# Patient Record
Sex: FEMALE | Race: WHITE | NOT HISPANIC OR LATINO | Employment: FULL TIME | URBAN - NONMETROPOLITAN AREA
[De-identification: names, ages, dates, MRNs, and addresses within clinical notes are randomized per-mention and may not be internally consistent; named-entity substitution may affect disease eponyms.]

---

## 2017-01-16 ENCOUNTER — OFFICE VISIT (OUTPATIENT)
Dept: OBSTETRICS AND GYNECOLOGY | Facility: CLINIC | Age: 22
End: 2017-01-16

## 2017-01-16 VITALS
WEIGHT: 140 LBS | SYSTOLIC BLOOD PRESSURE: 90 MMHG | BODY MASS INDEX: 21.97 KG/M2 | DIASTOLIC BLOOD PRESSURE: 64 MMHG | HEIGHT: 67 IN

## 2017-01-16 DIAGNOSIS — N83.209 HEMORRHAGIC CYST OF OVARY: Primary | ICD-10-CM

## 2017-01-16 DIAGNOSIS — N92.1 MENORRHAGIA WITH IRREGULAR CYCLE: ICD-10-CM

## 2017-01-16 PROCEDURE — 99213 OFFICE O/P EST LOW 20 MIN: CPT | Performed by: OBSTETRICS & GYNECOLOGY

## 2017-01-16 NOTE — PROGRESS NOTES
"Subjective   Chief Complaint   Patient presents with   • Pelvic Pain     Kristal Moreno is a 21 y.o. year old  who comes to review her recent testing and discuss next steps.  The patient was originally seen on 16 with a chief complaint of having pelvic pain.  She had a known hemorrhagic cyst of the ovary.  Since then her most recent menses started to soon.  It was fairly heavy.  And that lasted longer than normal.  So she went to the emergency room, and underwent a pelvic ultrasound.  That ultrasound demonstrates that the hemorrhagic cyst is gone, the ultrasound was normal.  She's feeling better now.  Her bleeding has stopped.    Review of Systems   Constitutional: Negative for activity change, appetite change, chills and fever.   Respiratory: Negative for apnea, chest tightness and shortness of breath.    Cardiovascular: Negative for chest pain.   Gastrointestinal: Negative for abdominal distention and abdominal pain.   Genitourinary: Positive for menstrual problem. Negative for difficulty urinating, dysuria, flank pain, genital sores, pelvic pain, vaginal bleeding, vaginal discharge and vaginal pain.            Objective   Visit Vitals   • BP 90/64   • Ht 67\" (170.2 cm)   • Wt 140 lb (63.5 kg)   • LMP 2017 (Exact Date)   • Breastfeeding No   • BMI 21.93 kg/m2       Physical Exam    General Appearance:  well developed, well nourished and in no acute distress    Lab Review   No data reviewed      Imaging   Pelvic ultrasound report           Assessment    Diagnosis Plan   1. Hemorrhagic cyst of ovary     2. Menorrhagia with irregular cycle            Plan   1. The hemorrhagic cyst has resolved.  2. As this irregular bleeding has only occurred once, and the patient desires pregnancy and does not wish to be on any method of birth control, we will just observe for now.           This note was electronically signed.    Jerardo Carrasco M.D.  2017    "

## 2017-02-09 ENCOUNTER — TELEPHONE (OUTPATIENT)
Dept: OBSTETRICS AND GYNECOLOGY | Facility: CLINIC | Age: 22
End: 2017-02-09

## 2017-02-09 NOTE — TELEPHONE ENCOUNTER
----- Message from Jacki Pozo sent at 2/6/2017  8:37 AM CST -----  Contact: [phone]  Patient is needing a callback. States she is in pain from her cyst. She has been to the ER several times and they telling her she needs to see her

## 2017-04-10 ENCOUNTER — HOSPITAL ENCOUNTER (EMERGENCY)
Facility: HOSPITAL | Age: 22
Discharge: HOME | End: 2017-04-10
Attending: FAMILY MEDICINE | Admitting: NURSE PRACTITIONER

## 2017-04-10 ENCOUNTER — APPOINTMENT (OUTPATIENT)
Dept: ULTRASOUND IMAGING | Facility: HOSPITAL | Age: 22
End: 2017-04-10

## 2017-04-10 VITALS
WEIGHT: 140 LBS | OXYGEN SATURATION: 99 % | RESPIRATION RATE: 18 BRPM | HEART RATE: 71 BPM | SYSTOLIC BLOOD PRESSURE: 115 MMHG | HEIGHT: 66 IN | DIASTOLIC BLOOD PRESSURE: 82 MMHG | TEMPERATURE: 98 F | BODY MASS INDEX: 22.5 KG/M2

## 2017-04-10 DIAGNOSIS — N92.1 PROLONGED MENSTRUATION: Primary | ICD-10-CM

## 2017-04-10 DIAGNOSIS — N93.9 VAGINAL BLEEDING: ICD-10-CM

## 2017-04-10 LAB
ALBUMIN SERPL-MCNC: 4.2 G/DL (ref 3.4–4.8)
ALBUMIN/GLOB SERPL: 1.4 G/DL (ref 1.1–1.8)
ALP SERPL-CCNC: 76 U/L (ref 38–126)
ALT SERPL W P-5'-P-CCNC: 20 U/L (ref 9–52)
AMPHET+METHAMPHET UR QL: NEGATIVE
ANION GAP SERPL CALCULATED.3IONS-SCNC: 11 MMOL/L (ref 5–15)
AST SERPL-CCNC: 19 U/L (ref 14–36)
BACTERIA UR QL AUTO: ABNORMAL /HPF
BARBITURATES UR QL SCN: NEGATIVE
BASOPHILS # BLD AUTO: 0.02 10*3/MM3 (ref 0–0.2)
BASOPHILS NFR BLD AUTO: 0.2 % (ref 0–2)
BENZODIAZ UR QL SCN: NEGATIVE
BILIRUB SERPL-MCNC: 0.6 MG/DL (ref 0.2–1.3)
BILIRUB UR QL STRIP: ABNORMAL
BUN BLD-MCNC: 16 MG/DL (ref 7–21)
BUN/CREAT SERPL: 22.5 (ref 7–25)
CALCIUM SPEC-SCNC: 9.1 MG/DL (ref 8.4–10.2)
CANDIDA ALBICANS: NEGATIVE
CANNABINOIDS SERPL QL: NEGATIVE
CHLORIDE SERPL-SCNC: 102 MMOL/L (ref 95–110)
CLARITY UR: CLEAR
CO2 SERPL-SCNC: 24 MMOL/L (ref 22–31)
COCAINE UR QL: NEGATIVE
COLOR UR: ABNORMAL
CREAT BLD-MCNC: 0.71 MG/DL (ref 0.5–1)
DEPRECATED RDW RBC AUTO: 41.1 FL (ref 36.4–46.3)
EOSINOPHIL # BLD AUTO: 0.28 10*3/MM3 (ref 0–0.7)
EOSINOPHIL NFR BLD AUTO: 3.1 % (ref 0–7)
ERYTHROCYTE [DISTWIDTH] IN BLOOD BY AUTOMATED COUNT: 13.6 % (ref 11.5–14.5)
GARDNERELLA VAGINALIS: NEGATIVE
GFR SERPL CREATININE-BSD FRML MDRD: 105 ML/MIN/1.73 (ref 71–165)
GLOBULIN UR ELPH-MCNC: 2.9 GM/DL (ref 2.3–3.5)
GLUCOSE BLD-MCNC: 92 MG/DL (ref 60–100)
GLUCOSE UR STRIP-MCNC: NEGATIVE MG/DL
HCG INTACT+B SERPL-ACNC: <2.4 MIU/ML
HCG SERPL QL: NEGATIVE
HCT VFR BLD AUTO: 38.4 % (ref 35–45)
HGB BLD-MCNC: 13.2 G/DL (ref 12–15.5)
HGB UR QL STRIP.AUTO: ABNORMAL
HOLD SPECIMEN: NORMAL
HOLD SPECIMEN: NORMAL
HYALINE CASTS UR QL AUTO: ABNORMAL /LPF
IMM GRANULOCYTES # BLD: 0.01 10*3/MM3 (ref 0–0.02)
IMM GRANULOCYTES NFR BLD: 0.1 % (ref 0–0.5)
INR PPP: 1.06 (ref 0.8–1.2)
KETONES UR QL STRIP: ABNORMAL
LEUKOCYTE ESTERASE UR QL STRIP.AUTO: NEGATIVE
LIPASE SERPL-CCNC: 44 U/L (ref 23–300)
LYMPHOCYTES # BLD AUTO: 3.65 10*3/MM3 (ref 0.6–4.2)
LYMPHOCYTES NFR BLD AUTO: 40.4 % (ref 10–50)
MCH RBC QN AUTO: 28.3 PG (ref 26.5–34)
MCHC RBC AUTO-ENTMCNC: 34.4 G/DL (ref 31.4–36)
MCV RBC AUTO: 82.4 FL (ref 80–98)
METHADONE UR QL SCN: NEGATIVE
MONOCYTES # BLD AUTO: 0.5 10*3/MM3 (ref 0–0.9)
MONOCYTES NFR BLD AUTO: 5.5 % (ref 0–12)
NEUTROPHILS # BLD AUTO: 4.58 10*3/MM3 (ref 2–8.6)
NEUTROPHILS NFR BLD AUTO: 50.7 % (ref 37–80)
NITRITE UR QL STRIP: NEGATIVE
OPIATES UR QL: NEGATIVE
OXYCODONE UR QL SCN: NEGATIVE
PH UR STRIP.AUTO: 5.5 [PH] (ref 5–9)
PLATELET # BLD AUTO: 214 10*3/MM3 (ref 150–450)
PMV BLD AUTO: 10.9 FL (ref 8–12)
POTASSIUM BLD-SCNC: 4 MMOL/L (ref 3.5–5.1)
PROT SERPL-MCNC: 7.1 G/DL (ref 6.3–8.6)
PROT UR QL STRIP: ABNORMAL
PROTHROMBIN TIME: 13.7 SECONDS (ref 11.1–15.3)
RBC # BLD AUTO: 4.66 10*6/MM3 (ref 3.77–5.16)
RBC # UR: ABNORMAL /HPF
REF LAB TEST METHOD: ABNORMAL
SODIUM BLD-SCNC: 137 MMOL/L (ref 137–145)
SP GR UR STRIP: 1.04 (ref 1–1.03)
SQUAMOUS #/AREA URNS HPF: ABNORMAL /HPF
TRICHOMONAS VAGINALIS PCR: NEGATIVE
UROBILINOGEN UR QL STRIP: ABNORMAL
WBC NRBC COR # BLD: 9.04 10*3/MM3 (ref 3.2–9.8)
WBC UR QL AUTO: ABNORMAL /HPF
WHOLE BLOOD HOLD SPECIMEN: NORMAL
WHOLE BLOOD HOLD SPECIMEN: NORMAL

## 2017-04-10 PROCEDURE — 87660 TRICHOMONAS VAGIN DIR PROBE: CPT | Performed by: NURSE PRACTITIONER

## 2017-04-10 PROCEDURE — 80307 DRUG TEST PRSMV CHEM ANLYZR: CPT | Performed by: NURSE PRACTITIONER

## 2017-04-10 PROCEDURE — 99284 EMERGENCY DEPT VISIT MOD MDM: CPT

## 2017-04-10 PROCEDURE — 25010000002 KETOROLAC TROMETHAMINE PER 15 MG: Performed by: NURSE PRACTITIONER

## 2017-04-10 PROCEDURE — 25010000002 ONDANSETRON PER 1 MG: Performed by: NURSE PRACTITIONER

## 2017-04-10 PROCEDURE — 25010000002 MORPHINE SULFATE (PF) 2 MG/ML SOLUTION: Performed by: NURSE PRACTITIONER

## 2017-04-10 PROCEDURE — 96361 HYDRATE IV INFUSION ADD-ON: CPT

## 2017-04-10 PROCEDURE — 96375 TX/PRO/DX INJ NEW DRUG ADDON: CPT

## 2017-04-10 PROCEDURE — 83690 ASSAY OF LIPASE: CPT | Performed by: NURSE PRACTITIONER

## 2017-04-10 PROCEDURE — 76830 TRANSVAGINAL US NON-OB: CPT

## 2017-04-10 PROCEDURE — 84703 CHORIONIC GONADOTROPIN ASSAY: CPT | Performed by: NURSE PRACTITIONER

## 2017-04-10 PROCEDURE — 81001 URINALYSIS AUTO W/SCOPE: CPT | Performed by: NURSE PRACTITIONER

## 2017-04-10 PROCEDURE — 80053 COMPREHEN METABOLIC PANEL: CPT | Performed by: NURSE PRACTITIONER

## 2017-04-10 PROCEDURE — 84702 CHORIONIC GONADOTROPIN TEST: CPT | Performed by: NURSE PRACTITIONER

## 2017-04-10 PROCEDURE — 87591 N.GONORRHOEAE DNA AMP PROB: CPT | Performed by: FAMILY MEDICINE

## 2017-04-10 PROCEDURE — 87480 CANDIDA DNA DIR PROBE: CPT | Performed by: NURSE PRACTITIONER

## 2017-04-10 PROCEDURE — 85025 COMPLETE CBC W/AUTO DIFF WBC: CPT | Performed by: NURSE PRACTITIONER

## 2017-04-10 PROCEDURE — 87510 GARDNER VAG DNA DIR PROBE: CPT | Performed by: NURSE PRACTITIONER

## 2017-04-10 PROCEDURE — 87491 CHLMYD TRACH DNA AMP PROBE: CPT | Performed by: FAMILY MEDICINE

## 2017-04-10 PROCEDURE — 87086 URINE CULTURE/COLONY COUNT: CPT | Performed by: NURSE PRACTITIONER

## 2017-04-10 PROCEDURE — 96374 THER/PROPH/DIAG INJ IV PUSH: CPT

## 2017-04-10 PROCEDURE — 85610 PROTHROMBIN TIME: CPT | Performed by: NURSE PRACTITIONER

## 2017-04-10 RX ORDER — KETOROLAC TROMETHAMINE 30 MG/ML
30 INJECTION, SOLUTION INTRAMUSCULAR; INTRAVENOUS ONCE
Status: COMPLETED | OUTPATIENT
Start: 2017-04-10 | End: 2017-04-10

## 2017-04-10 RX ORDER — MORPHINE SULFATE 2 MG/ML
2 INJECTION, SOLUTION INTRAMUSCULAR; INTRAVENOUS ONCE
Status: COMPLETED | OUTPATIENT
Start: 2017-04-10 | End: 2017-04-10

## 2017-04-10 RX ORDER — ONDANSETRON 4 MG/1
4 TABLET, ORALLY DISINTEGRATING ORAL EVERY 6 HOURS PRN
Qty: 30 TABLET | Refills: 0 | Status: SHIPPED | OUTPATIENT
Start: 2017-04-10 | End: 2018-09-06

## 2017-04-10 RX ORDER — IBUPROFEN 600 MG/1
600 TABLET ORAL EVERY 6 HOURS PRN
Qty: 30 TABLET | Refills: 0 | Status: SHIPPED | OUTPATIENT
Start: 2017-04-10 | End: 2018-09-06

## 2017-04-10 RX ORDER — HYDROCODONE BITARTRATE AND ACETAMINOPHEN 7.5; 325 MG/1; MG/1
1 TABLET ORAL EVERY 6 HOURS PRN
Qty: 12 TABLET | Refills: 0 | Status: SHIPPED | OUTPATIENT
Start: 2017-04-10 | End: 2017-05-06

## 2017-04-10 RX ORDER — ONDANSETRON 2 MG/ML
4 INJECTION INTRAMUSCULAR; INTRAVENOUS ONCE
Status: COMPLETED | OUTPATIENT
Start: 2017-04-10 | End: 2017-04-10

## 2017-04-10 RX ADMIN — MORPHINE SULFATE 2 MG: 2 INJECTION, SOLUTION INTRAMUSCULAR; INTRAVENOUS at 19:12

## 2017-04-10 RX ADMIN — SODIUM CHLORIDE 1000 ML: 900 INJECTION, SOLUTION INTRAVENOUS at 18:44

## 2017-04-10 RX ADMIN — KETOROLAC TROMETHAMINE 30 MG: 30 INJECTION, SOLUTION INTRAMUSCULAR; INTRAVENOUS at 19:30

## 2017-04-10 RX ADMIN — ONDANSETRON 4 MG: 2 INJECTION INTRAMUSCULAR; INTRAVENOUS at 19:13

## 2017-04-10 NOTE — ED PROVIDER NOTES
Subjective   HPI Comments: Pt had the same symptoms in Jan,2017 and saw Dr. Carrasco on 01/16 and observation. Pt stated she started  Her menstruation on March 28 and has not stopped yet, associated suprapubic pain, which radiated to her low back a week ago. She also noticed breast pain and pink mucus vaginal discharge since last week. Pt tried to take lortab (leftover from Jan) and had no improvement. The bleeding from brown, pink to bright red.  Reported shaky and sweaty yesterday.    Patient is a 22 y.o. female presenting with vaginal bleeding.   History provided by:  Patient and parent (Mom)  Vaginal Bleeding   Quality: longer than her normal menstrual cycle, varying amount and color   Severity:  Moderate  Onset quality:  Gradual  Duration: since March 28,2017.  Timing:  Intermittent  Progression:  Waxing and waning  Chronicity:  Recurrent  Menstrual history:  Regular (usually lasted 5 days)  Number of pads used:  Today 3-4 pads  Relieved by:  Nothing  Worsened by:  Nothing  Ineffective treatments:  None tried  Associated symptoms: abdominal pain, back pain, dizziness, nausea and vaginal discharge    Associated symptoms: no dyspareunia, no dysuria, no fatigue and no fever    Abdominal pain:     Location:  Suprapubic    Quality: cramping      Severity:  Severe    Onset quality:  Gradual    Duration:  2 weeks    Timing:  Intermittent    Progression:  Waxing and waning    Chronicity:  Recurrent  Dizziness:     Severity:  Mild    Duration:  1 day    Timing:  Intermittent    Progression:  Unchanged  Nausea:     Severity:  Moderate    Onset quality:  Gradual    Duration:  2 days    Timing:  Intermittent    Progression:  Waxing and waning  Vaginal discharge:     Quality:  Bloody    Severity:  Mild    Onset quality:  Gradual    Duration: since March 28,2017.    Timing:  Intermittent    Progression:  Waxing and waning    Chronicity:  Recurrent      Review of Systems   Constitutional: Negative for activity change,  appetite change, chills, diaphoresis, fatigue and fever.   Respiratory: Negative.  Negative for cough and shortness of breath.    Gastrointestinal: Positive for abdominal pain and nausea.   Endocrine: Negative.    Genitourinary: Positive for hematuria, menstrual problem, vaginal bleeding and vaginal discharge. Negative for dyspareunia, dysuria, flank pain, frequency and urgency.   Musculoskeletal: Positive for back pain.   Skin: Negative.    Neurological: Positive for dizziness.   Psychiatric/Behavioral: Negative.    All other systems reviewed and are negative.      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      • Upper respiratory infection    • Urinary tract infectious disease    • Wheezing        No Known Allergies    Past Surgical History:   Procedure Laterality Date   • INJECTION OF MEDICATION  12/19/2012    Depo 150 mg (4)      • INJECTION OF MEDICATION  03/22/2013    Depo Provera (medroxyprogesterone acetate) (1)      • INJECTION OF MEDICATION  12/19/2011    Phenergan (1)      • INJECTION OF MEDICATION  12/19/2011    Toradol (2)      • TONSILLECTOMY         Family History   Problem Relation Age of Onset   • Breast cancer Other    • Lung cancer Other    • Stroke Other        Social History     Social History   • Marital status: Single     Spouse name: N/A   • Number of children: N/A   • Years of education: N/A     Social History Main Topics   • Smoking status: Current Every Day Smoker     Types: Cigarettes   • Smokeless tobacco: Never Used   • Alcohol use No   • Drug use: No   • Sexual activity: Yes     Partners: Male     Birth control/  protection: None     Other Topics Concern   • None     Social History Narrative           Objective   Physical Exam   Constitutional: She is oriented to person, place, and time. No distress.   Cardiovascular: Normal rate, regular rhythm, normal heart sounds and intact distal pulses.    Pulmonary/Chest: Effort normal and breath sounds normal.   Abdominal: Soft. Bowel sounds are normal. There is tenderness in the suprapubic area.   Genitourinary: Pelvic exam was performed with patient supine. Cervix exhibits motion tenderness. Right adnexum displays tenderness. Left adnexum displays tenderness. Vaginal discharge found.   Neurological: She is alert and oriented to person, place, and time.   Skin: Skin is warm and dry.   Psychiatric: She has a normal mood and affect. Her behavior is normal. Judgment and thought content normal.   Nursing note and vitals reviewed.      Procedures         ED Course  ED Course   Comment By Time   Updated pt and her Mom regarding the test results. Pt is still in severe pain. Will call Dr. Zapata for advice. BRYANNA Graham 04/10 1917   D/w Dr. Zapata regarding pt's condition and test results,she recommended to treat with ibuprofen and f/u with women [facility] this week. BRYANNA Graham 04/10 1922   Both pt and her mom wondered if pt already had miscarriage and already passed. Will add HCG quantity test, instructed them to f/u with OBGYN for the results, they understood it. BRYANNA Graham 04/10 1936   [facility] #[id number] BRYANNA Graham 04/10 1940      Labs Reviewed   URINALYSIS W/ CULTURE IF INDICATED - Abnormal; Notable for the following:        Result Value    Specific Gravity, UA 1.036 (*)     Ketones, UA Trace (*)     Bilirubin, UA Small (1+) (*)     Blood, UA Large (3+) (*)     Protein,  mg/dL (2+) (*)     All other components within normal limits   URINALYSIS, MICROSCOPIC ONLY - Abnormal; Notable for the following:     RBC, UA Too Numerous to Count (*)     Bacteria, UA 1+  (*)     Squamous Epithelial Cells, UA 6-12 (*)     All other components within normal limits   PROTIME-INR - Normal    Narrative:     Therapeutic range for most indications is 2.0-3.0 INR,  or 2.5-3.5 for mechanical heart valves.   HCG, SERUM, QUALITATIVE - Normal   URINE DRUG SCREEN - Normal    Narrative:     Negative Thresholds For Drugs Screened in Urine:     Amphetamines          500 ng/ml  Barbiturates          200 ng/ml  Benzodiazepines       200 ng/ml  Cocaine               150 ng/ml  Methadone             300 ng/mL  Opiates               300 ng/mL  Oxycodone             100 ng/mL  THC                   20 ng/mL    The normal value for all drugs tested is negative. This report includes final unconfirmed screening results.  A positive result by this assay can be, at your request, sent to the Reference Lab for confirmation by gas chromatography. Unconfirmed results must not be used for non-medical purposes, such as employment or legal testing. Clinical consideration should be applied to any drug of abuse test result, particularly when unconfirmed results are used.   COMPREHENSIVE METABOLIC PANEL - Normal   LIPASE - Normal   CBC WITH AUTO DIFFERENTIAL - Normal   LISA ALBICANS, GARDNERELLA VAGINALIS, TRICHOMONAS VAGINALIS, PCR   URINE CULTURE   CHLAMYDIA TRACHOMATIS, NEISSERIA GONORRHOEAE, PCR   RAINBOW DRAW    Narrative:     The following orders were created for panel order [place] Draw.  Procedure                               Abnormality         Status                     ---------                               -----------         ------                     Light Blue Top[[id number]]                                    In process                 Green Top (Gel)[[id number]]                                   In process                 Lavender Top[[id number]]                                      In process                 Gold Top - SST[[id number]]                                    In process                   Please  view results for these tests on the individual orders.   HCG, QUANTITATIVE, PREGNANCY   CBC AND DIFFERENTIAL    Narrative:     The following orders were created for panel order CBC & Differential.  Procedure                               Abnormality         Status                     ---------                               -----------         ------                     CBC Auto Differential[[id number]]         Normal              Final result                 Please view results for these tests on the individual orders.   LIGHT BLUE TOP   GREEN TOP   LAVENDER TOP   GOLD TOP - SST       US Non-ob Transvaginal   Final Result   Normal pelvic ultrasound.      Electronically signed by:  Maryjane Da Silva MD  4/10/2017 7:01 PM CDT   Workstation: [employer]                  [facility]  Number of Diagnoses or Management Options  Prolonged menstruation: new and requires workup  Vaginal bleeding: new and requires workup     Amount and/or Complexity of Data Reviewed  Clinical lab tests: reviewed  Tests in the radiology section of CPT®: reviewed  Discuss the patient with other providers: yes    Risk of Complications, Morbidity, and/or Mortality  Presenting problems: moderate  Diagnostic procedures: moderate  Management options: moderate    Patient Progress  Patient progress: stable      Final diagnoses:   Prolonged menstruation   Vaginal bleeding            BRYANNA Graham  04/10/17 1943

## 2017-04-12 ENCOUNTER — OFFICE VISIT (OUTPATIENT)
Dept: OBSTETRICS AND GYNECOLOGY | Facility: CLINIC | Age: 22
End: 2017-04-12

## 2017-04-12 VITALS
HEIGHT: 66 IN | DIASTOLIC BLOOD PRESSURE: 70 MMHG | WEIGHT: 144 LBS | BODY MASS INDEX: 23.14 KG/M2 | SYSTOLIC BLOOD PRESSURE: 112 MMHG

## 2017-04-12 DIAGNOSIS — N93.9 ABNORMAL UTERINE BLEEDING (AUB): Primary | ICD-10-CM

## 2017-04-12 LAB
BACTERIA SPEC AEROBE CULT: ABNORMAL
C TRACH RRNA CVX QL NAA+PROBE: NOT DETECTED
N GONORRHOEA RRNA SPEC QL NAA+PROBE: NOT DETECTED

## 2017-04-12 PROCEDURE — 99213 OFFICE O/P EST LOW 20 MIN: CPT | Performed by: OBSTETRICS & GYNECOLOGY

## 2017-04-12 RX ORDER — NORGESTIMATE AND ETHINYL ESTRADIOL 0.25-0.035
1 KIT ORAL DAILY
Qty: 28 TABLET | Refills: 12 | Status: SHIPPED | OUTPATIENT
Start: 2017-04-12 | End: 2017-05-06

## 2017-04-12 NOTE — PROGRESS NOTES
Subjective   Kristal Moreno is a 22 y.o. female.     HPI Comments: Patient presents today to discuss AUB which has been ongoing since the start of the year.   Regular monthly periods up until Jan  In Jan had 4 periods followed by 2 in February.  Current bleeding episode has been ongoing since march 28.  Bleeding severity waxes and wanes, includes blood clots.  G0  Not currently on anything for contraception.  Not trying to get pregnant but not trying to prevent it.  Discussed potential options for evaluation and management of abnormal bleeding with risks benefits and alternatives for each.  Patient would like to proceed with combined oral contraceptives at this time.  We discussed potential for breakthrough bleeding especially early in therapy.  Declines pelvic exam today.    Vaginal Bleeding           Review of Systems   Genitourinary: Positive for vaginal bleeding.   All other systems reviewed and are negative.      Objective   Physical Exam   Constitutional: She is oriented to person, place, and time. She appears well-developed and well-nourished. No distress.   HENT:   Head: Normocephalic and atraumatic.   Right Ear: External ear normal.   Left Ear: External ear normal.   Nose: Nose normal.   Mouth/Throat: Oropharynx is clear and moist.   Neurological: She is alert and oriented to person, place, and time.   Skin: She is not diaphoretic.   Psychiatric: She has a normal mood and affect. Her behavior is normal. Judgment and thought content normal.   Vitals reviewed.      Assessment/Plan   Kristal was seen today for vaginal bleeding.    Diagnoses and all orders for this visit:    Abnormal uterine bleeding (AUB)    Other orders  -     norgestimate-ethinyl estradiol (SPRINTEC 28) 0.25-35 MG-MCG per tablet; Take 1 tablet by mouth Daily.       OCPs f/u 2 mo

## 2018-10-30 ENCOUNTER — HOSPITAL ENCOUNTER (EMERGENCY)
Facility: HOSPITAL | Age: 23
Discharge: HOME | End: 2018-10-30
Attending: EMERGENCY MEDICINE | Admitting: EMERGENCY MEDICINE

## 2018-10-30 ENCOUNTER — APPOINTMENT (OUTPATIENT)
Dept: GENERAL RADIOLOGY | Facility: HOSPITAL | Age: 23
End: 2018-10-30

## 2018-10-30 VITALS
DIASTOLIC BLOOD PRESSURE: 63 MMHG | OXYGEN SATURATION: 100 % | HEART RATE: 66 BPM | BODY MASS INDEX: 24.27 KG/M2 | TEMPERATURE: 97.5 F | RESPIRATION RATE: 16 BRPM | HEIGHT: 66 IN | SYSTOLIC BLOOD PRESSURE: 137 MMHG | WEIGHT: 151 LBS

## 2018-10-30 DIAGNOSIS — J40 BRONCHITIS: Primary | ICD-10-CM

## 2018-10-30 LAB
ANION GAP SERPL CALCULATED.3IONS-SCNC: 6 MMOL/L (ref 5–15)
BASOPHILS # BLD AUTO: 0.01 10*3/MM3 (ref 0–0.2)
BASOPHILS NFR BLD AUTO: 0.1 % (ref 0–2)
BILIRUB UR QL STRIP: NEGATIVE
BUN BLD-MCNC: 12 MG/DL (ref 7–21)
BUN/CREAT SERPL: 16.7 (ref 7–25)
CALCIUM SPEC-SCNC: 9.1 MG/DL (ref 8.4–10.2)
CHLORIDE SERPL-SCNC: 108 MMOL/L (ref 95–110)
CLARITY UR: CLEAR
CO2 SERPL-SCNC: 24 MMOL/L (ref 22–31)
COLOR UR: YELLOW
CREAT BLD-MCNC: 0.72 MG/DL (ref 0.5–1)
DEPRECATED RDW RBC AUTO: 39.5 FL (ref 36.4–46.3)
EOSINOPHIL # BLD AUTO: 0.49 10*3/MM3 (ref 0–0.7)
EOSINOPHIL NFR BLD AUTO: 5.3 % (ref 0–7)
ERYTHROCYTE [DISTWIDTH] IN BLOOD BY AUTOMATED COUNT: 13 % (ref 11.5–14.5)
FLUAV AG NPH QL: NEGATIVE
FLUBV AG NPH QL IA: NEGATIVE
GFR SERPL CREATININE-BSD FRML MDRD: 102 ML/MIN/1.73 (ref 71–165)
GLUCOSE BLD-MCNC: 95 MG/DL (ref 60–100)
GLUCOSE UR STRIP-MCNC: NEGATIVE MG/DL
HCG SERPL QL: NEGATIVE
HCT VFR BLD AUTO: 39.5 % (ref 35–45)
HGB BLD-MCNC: 13.5 G/DL (ref 12–15.5)
HGB UR QL STRIP.AUTO: NEGATIVE
HOLD SPECIMEN: NORMAL
HOLD SPECIMEN: NORMAL
IMM GRANULOCYTES # BLD: 0.02 10*3/MM3 (ref 0–0.02)
IMM GRANULOCYTES NFR BLD: 0.2 % (ref 0–0.5)
KETONES UR QL STRIP: NEGATIVE
LEUKOCYTE ESTERASE UR QL STRIP.AUTO: NEGATIVE
LYMPHOCYTES # BLD AUTO: 3.92 10*3/MM3 (ref 0.6–4.2)
LYMPHOCYTES NFR BLD AUTO: 42.3 % (ref 10–50)
MCH RBC QN AUTO: 28.8 PG (ref 26.5–34)
MCHC RBC AUTO-ENTMCNC: 34.2 G/DL (ref 31.4–36)
MCV RBC AUTO: 84.2 FL (ref 80–98)
MONOCYTES # BLD AUTO: 0.63 10*3/MM3 (ref 0–0.9)
MONOCYTES NFR BLD AUTO: 6.8 % (ref 0–12)
NEUTROPHILS # BLD AUTO: 4.2 10*3/MM3 (ref 2–8.6)
NEUTROPHILS NFR BLD AUTO: 45.3 % (ref 37–80)
NITRITE UR QL STRIP: NEGATIVE
PH UR STRIP.AUTO: 7 [PH] (ref 5–9)
PLATELET # BLD AUTO: 190 10*3/MM3 (ref 150–450)
PMV BLD AUTO: 11.3 FL (ref 8–12)
POTASSIUM BLD-SCNC: 3.8 MMOL/L (ref 3.5–5.1)
PROT UR QL STRIP: NEGATIVE
RBC # BLD AUTO: 4.69 10*6/MM3 (ref 3.77–5.16)
SODIUM BLD-SCNC: 138 MMOL/L (ref 137–145)
SP GR UR STRIP: 1.02 (ref 1–1.03)
UROBILINOGEN UR QL STRIP: NORMAL
WBC NRBC COR # BLD: 9.27 10*3/MM3 (ref 3.2–9.8)
WHOLE BLOOD HOLD SPECIMEN: NORMAL
WHOLE BLOOD HOLD SPECIMEN: NORMAL

## 2018-10-30 PROCEDURE — 80048 BASIC METABOLIC PNL TOTAL CA: CPT | Performed by: EMERGENCY MEDICINE

## 2018-10-30 PROCEDURE — 96361 HYDRATE IV INFUSION ADD-ON: CPT

## 2018-10-30 PROCEDURE — 93005 ELECTROCARDIOGRAM TRACING: CPT | Performed by: EMERGENCY MEDICINE

## 2018-10-30 PROCEDURE — 81003 URINALYSIS AUTO W/O SCOPE: CPT | Performed by: EMERGENCY MEDICINE

## 2018-10-30 PROCEDURE — 99284 EMERGENCY DEPT VISIT MOD MDM: CPT

## 2018-10-30 PROCEDURE — 96374 THER/PROPH/DIAG INJ IV PUSH: CPT

## 2018-10-30 PROCEDURE — 84703 CHORIONIC GONADOTROPIN ASSAY: CPT | Performed by: EMERGENCY MEDICINE

## 2018-10-30 PROCEDURE — 71046 X-RAY EXAM CHEST 2 VIEWS: CPT

## 2018-10-30 PROCEDURE — 25010000002 KETOROLAC TROMETHAMINE PER 15 MG: Performed by: EMERGENCY MEDICINE

## 2018-10-30 PROCEDURE — 87804 INFLUENZA ASSAY W/OPTIC: CPT | Performed by: EMERGENCY MEDICINE

## 2018-10-30 PROCEDURE — 93010 ELECTROCARDIOGRAM REPORT: CPT | Performed by: INTERNAL MEDICINE

## 2018-10-30 PROCEDURE — 85025 COMPLETE CBC W/AUTO DIFF WBC: CPT | Performed by: EMERGENCY MEDICINE

## 2018-10-30 RX ORDER — HYDROCODONE BITARTRATE AND ACETAMINOPHEN 7.5; 325 MG/1; MG/1
1 TABLET ORAL EVERY 6 HOURS PRN
COMMUNITY
End: 2018-12-11

## 2018-10-30 RX ORDER — AZITHROMYCIN 250 MG/1
TABLET, FILM COATED ORAL
Qty: 6 TABLET | Refills: 0 | Status: SHIPPED | OUTPATIENT
Start: 2018-10-30 | End: 2018-12-11

## 2018-10-30 RX ORDER — BENZONATATE 100 MG/1
100 CAPSULE ORAL 3 TIMES DAILY PRN
Qty: 20 CAPSULE | Refills: 0 | Status: SHIPPED | OUTPATIENT
Start: 2018-10-30 | End: 2018-12-11

## 2018-10-30 RX ORDER — KETOROLAC TROMETHAMINE 30 MG/ML
30 INJECTION, SOLUTION INTRAMUSCULAR; INTRAVENOUS ONCE
Status: COMPLETED | OUTPATIENT
Start: 2018-10-30 | End: 2018-10-30

## 2018-10-30 RX ORDER — IBUPROFEN 800 MG/1
800 TABLET ORAL EVERY 6 HOURS PRN
Qty: 20 TABLET | Refills: 0 | Status: SHIPPED | OUTPATIENT
Start: 2018-10-30 | End: 2018-12-11

## 2018-10-30 RX ADMIN — KETOROLAC TROMETHAMINE 30 MG: 30 INJECTION, SOLUTION INTRAMUSCULAR; INTRAVENOUS at 09:12

## 2018-10-30 RX ADMIN — SODIUM CHLORIDE 1000 ML: 9 INJECTION, SOLUTION INTRAVENOUS at 09:12

## 2019-01-09 ENCOUNTER — HOSPITAL ENCOUNTER (EMERGENCY)
Facility: HOSPITAL | Age: 24
Discharge: HOME | End: 2019-01-09
Attending: EMERGENCY MEDICINE | Admitting: EMERGENCY MEDICINE

## 2019-01-09 ENCOUNTER — APPOINTMENT (OUTPATIENT)
Dept: ULTRASOUND IMAGING | Facility: HOSPITAL | Age: 24
End: 2019-01-09

## 2019-01-09 VITALS
HEART RATE: 77 BPM | WEIGHT: 138 LBS | OXYGEN SATURATION: 99 % | BODY MASS INDEX: 22.18 KG/M2 | DIASTOLIC BLOOD PRESSURE: 64 MMHG | TEMPERATURE: 97.8 F | SYSTOLIC BLOOD PRESSURE: 113 MMHG | HEIGHT: 66 IN | RESPIRATION RATE: 18 BRPM

## 2019-01-09 DIAGNOSIS — O26.891 PELVIC PAIN IN PREGNANCY, ANTEPARTUM, FIRST TRIMESTER: Primary | ICD-10-CM

## 2019-01-09 DIAGNOSIS — R10.2 PELVIC PAIN IN PREGNANCY, ANTEPARTUM, FIRST TRIMESTER: Primary | ICD-10-CM

## 2019-01-09 LAB
ALBUMIN SERPL-MCNC: 4.5 G/DL (ref 3.4–4.8)
ALBUMIN/GLOB SERPL: 1.7 G/DL (ref 1.1–1.8)
ALP SERPL-CCNC: 70 U/L (ref 38–126)
ALT SERPL W P-5'-P-CCNC: 11 U/L (ref 9–52)
ANION GAP SERPL CALCULATED.3IONS-SCNC: 9 MMOL/L (ref 5–15)
AST SERPL-CCNC: 19 U/L (ref 14–36)
B-HCG UR QL: POSITIVE
BASOPHILS # BLD AUTO: 0.03 10*3/MM3 (ref 0–0.2)
BASOPHILS NFR BLD AUTO: 0.3 % (ref 0–2)
BILIRUB SERPL-MCNC: 0.3 MG/DL (ref 0.2–1.3)
BILIRUB UR QL STRIP: NEGATIVE
BUN BLD-MCNC: 8 MG/DL (ref 7–21)
BUN/CREAT SERPL: 13.6 (ref 7–25)
CALCIUM SPEC-SCNC: 9.7 MG/DL (ref 8.4–10.2)
CHLORIDE SERPL-SCNC: 100 MMOL/L (ref 95–110)
CLARITY UR: CLEAR
CO2 SERPL-SCNC: 26 MMOL/L (ref 22–31)
COLOR UR: YELLOW
CREAT BLD-MCNC: 0.59 MG/DL (ref 0.5–1)
DEPRECATED RDW RBC AUTO: 41.4 FL (ref 36.4–46.3)
EOSINOPHIL # BLD AUTO: 0.33 10*3/MM3 (ref 0–0.7)
EOSINOPHIL NFR BLD AUTO: 2.9 % (ref 0–7)
ERYTHROCYTE [DISTWIDTH] IN BLOOD BY AUTOMATED COUNT: 13.2 % (ref 11.5–14.5)
GFR SERPL CREATININE-BSD FRML MDRD: 129 ML/MIN/1.73 (ref 71–165)
GLOBULIN UR ELPH-MCNC: 2.6 GM/DL (ref 2.3–3.5)
GLUCOSE BLD-MCNC: 83 MG/DL (ref 60–100)
GLUCOSE UR STRIP-MCNC: NEGATIVE MG/DL
HCT VFR BLD AUTO: 40.6 % (ref 35–45)
HGB BLD-MCNC: 14.1 G/DL (ref 12–15.5)
HGB UR QL STRIP.AUTO: NEGATIVE
IMM GRANULOCYTES # BLD AUTO: 0.03 10*3/MM3 (ref 0–0.02)
IMM GRANULOCYTES NFR BLD AUTO: 0.3 % (ref 0–0.5)
KETONES UR QL STRIP: NEGATIVE
LEUKOCYTE ESTERASE UR QL STRIP.AUTO: NEGATIVE
LIPASE SERPL-CCNC: 64 U/L (ref 23–300)
LYMPHOCYTES # BLD AUTO: 4.38 10*3/MM3 (ref 0.6–4.2)
LYMPHOCYTES NFR BLD AUTO: 38.7 % (ref 10–50)
MCH RBC QN AUTO: 29.5 PG (ref 26.5–34)
MCHC RBC AUTO-ENTMCNC: 34.7 G/DL (ref 31.4–36)
MCV RBC AUTO: 84.9 FL (ref 80–98)
MONOCYTES # BLD AUTO: 0.71 10*3/MM3 (ref 0–0.9)
MONOCYTES NFR BLD AUTO: 6.3 % (ref 0–12)
NEUTROPHILS # BLD AUTO: 5.85 10*3/MM3 (ref 2–8.6)
NEUTROPHILS NFR BLD AUTO: 51.5 % (ref 37–80)
NITRITE UR QL STRIP: NEGATIVE
PH UR STRIP.AUTO: 6.5 [PH] (ref 5–9)
PLATELET # BLD AUTO: 203 10*3/MM3 (ref 150–450)
PMV BLD AUTO: 10.2 FL (ref 8–12)
POTASSIUM BLD-SCNC: 3.8 MMOL/L (ref 3.5–5.1)
PROT SERPL-MCNC: 7.1 G/DL (ref 6.3–8.6)
PROT UR QL STRIP: NEGATIVE
RBC # BLD AUTO: 4.78 10*6/MM3 (ref 3.77–5.16)
SODIUM BLD-SCNC: 135 MMOL/L (ref 137–145)
SP GR UR STRIP: 1.01 (ref 1–1.03)
UROBILINOGEN UR QL STRIP: NORMAL
WBC NRBC COR # BLD: 11.33 10*3/MM3 (ref 3.2–9.8)
WHOLE BLOOD HOLD SPECIMEN: NORMAL

## 2019-01-09 PROCEDURE — 80053 COMPREHEN METABOLIC PANEL: CPT | Performed by: EMERGENCY MEDICINE

## 2019-01-09 PROCEDURE — 81025 URINE PREGNANCY TEST: CPT | Performed by: EMERGENCY MEDICINE

## 2019-01-09 PROCEDURE — 76817 TRANSVAGINAL US OBSTETRIC: CPT

## 2019-01-09 PROCEDURE — 83690 ASSAY OF LIPASE: CPT | Performed by: EMERGENCY MEDICINE

## 2019-01-09 PROCEDURE — 96360 HYDRATION IV INFUSION INIT: CPT

## 2019-01-09 PROCEDURE — 85025 COMPLETE CBC W/AUTO DIFF WBC: CPT | Performed by: EMERGENCY MEDICINE

## 2019-01-09 PROCEDURE — 99283 EMERGENCY DEPT VISIT LOW MDM: CPT

## 2019-01-09 PROCEDURE — 36415 COLL VENOUS BLD VENIPUNCTURE: CPT | Performed by: EMERGENCY MEDICINE

## 2019-01-09 PROCEDURE — 81003 URINALYSIS AUTO W/O SCOPE: CPT | Performed by: EMERGENCY MEDICINE

## 2019-01-09 RX ORDER — ACETAMINOPHEN 500 MG
1000 TABLET ORAL ONCE
Status: COMPLETED | OUTPATIENT
Start: 2019-01-09 | End: 2019-01-09

## 2019-01-09 RX ORDER — PNV NO.118/IRON FUMARATE/FA 29 MG-1 MG
1 TABLET,CHEWABLE ORAL DAILY
Qty: 30 TABLET | Refills: 0 | Status: SHIPPED | OUTPATIENT
Start: 2019-01-09 | End: 2019-02-11 | Stop reason: SDUPTHER

## 2019-01-09 RX ADMIN — SODIUM CHLORIDE 1000 ML: 900 INJECTION, SOLUTION INTRAVENOUS at 18:47

## 2019-01-09 RX ADMIN — ACETAMINOPHEN 1000 MG: 500 TABLET ORAL at 18:35

## 2019-01-10 NOTE — ED PROVIDER NOTES
Subjective   23 years old presented in the ER with a chief complaint of suprapubic/right pelvic pain for the last 2 days.  She has a positive pregnancy test times since yesterday.  Patient is also having morning sickness.  Patient is sent in by them and plantar to rule out ectopic.        History provided by:  Patient  Abdominal Pain   Pain location:  Suprapubic (Pelvic right)  Pain quality: cramping    Pain radiates to:  Does not radiate  Pain severity:  Moderate  Onset quality:  Gradual  Duration:  2 days  Timing:  Constant  Progression:  Worsening  Chronicity:  New  Relieved by:  Nothing  Worsened by:  Nothing  Ineffective treatments:  OTC medications  Associated symptoms: nausea    Associated symptoms: no anorexia, no chest pain, no chills, no constipation, no cough, no diarrhea, no dysuria, no fever, no hematochezia, no hematuria, no shortness of breath, no sore throat, no vaginal bleeding and no vaginal discharge        Review of Systems   Constitutional: Negative for chills and fever.   HENT: Negative for congestion, postnasal drip, sinus pain and sore throat.    Eyes: Negative for pain.   Respiratory: Negative for cough, chest tightness and shortness of breath.    Cardiovascular: Negative for chest pain.   Gastrointestinal: Positive for abdominal pain and nausea. Negative for anorexia, constipation, diarrhea and hematochezia.   Genitourinary: Negative for dysuria, hematuria, vaginal bleeding and vaginal discharge.   Musculoskeletal: Negative for back pain.   Skin: Negative for color change.   Neurological: Negative for dizziness and numbness.   Psychiatric/Behavioral: Negative for agitation.       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; sent for evaluation      • Upper respiratory infection    • Urinary tract infectious disease    • Wheezing        No Known Allergies    Past Surgical History:   Procedure Laterality Date   • INJECTION OF MEDICATION  12/19/2012    Depo 150 mg (4)      • INJECTION OF MEDICATION  03/22/2013    Depo Provera (medroxyprogesterone acetate) (1)      • INJECTION OF MEDICATION  12/19/2011    Phenergan (1)      • INJECTION OF MEDICATION  12/19/2011    Toradol (2)      • TONSILLECTOMY         Family History   Problem Relation Age of Onset   • Breast cancer Other    • Lung cancer Other    • Stroke Other        Social History     Socioeconomic History   • Marital status: Single     Spouse name: Not on file   • Number of children: Not on file   • Years of education: Not on file   • Highest education level: Not on file   Tobacco Use   • Smoking status: Current Every Day Smoker     Packs/day: 0.50     Years: 5.00     Pack years: 2.50     Types: Cigarettes   • Smokeless tobacco: Never Used   Substance and Sexual Activity   • Alcohol use: No   • Drug use: No   • Sexual activity: Yes     Partners: Male     Birth control/protection: None           Objective   Physical Exam   Constitutional: She is oriented to person, place, and time. She appears well-developed and well-nourished.   HENT:   Head: Normocephalic and atraumatic.   Mouth/Throat: Oropharynx is clear and moist.   Eyes: EOM are normal. Pupils are equal, round, and reactive to light.   Cardiovascular: Normal rate, regular rhythm and normal heart sounds.   Pulmonary/Chest: Effort normal and breath sounds normal.   Abdominal: Soft. Normal appearance and bowel sounds are normal. There is tenderness in the right lower quadrant and suprapubic area. There is no guarding and no tenderness at McBurney's point.   Neurological: She is alert and oriented to person, place, and time.   Skin: Skin is warm.  Capillary refill takes less than 2 seconds.   Psychiatric: She has a normal mood and affect. Her behavior is normal.   Nursing note and vitals reviewed.      Procedures           ED Course                  MDM  Number of Diagnoses or Management Options  Diagnosis management comments: Ruled out ectopic pregnancy.  She is given IV fluid and Tylenol on reevaluation feeling much better.  She has minimal elevated white count otherwise unremarkable chemistries.  No UTI.  She is advised to take Tylenol only as needed and will be started on prenatal vitamins.  She does not have any peritoneal signs suggesting acute appendicitis.    Recommended outpatient OB follow-up.  Discussed signs symptoms of worsening needing return to ER which he seems understanding.       Amount and/or Complexity of Data Reviewed  Clinical lab tests: ordered and reviewed  Tests in the radiology section of CPT®: ordered and reviewed      Labs Reviewed   COMPREHENSIVE METABOLIC PANEL - Abnormal; Notable for the following components:       Result Value    Sodium 135 (*)     All other components within normal limits   PREGNANCY, URINE - Abnormal; Notable for the following components:    HCG, Urine QL Positive (*)     All other components within normal limits   CBC WITH AUTO DIFFERENTIAL - Abnormal; Notable for the following components:    WBC 11.33 (*)     Lymphocytes, Absolute 4.38 (*)     Immature Grans, Absolute 0.03 (*)     All other components within normal limits   LIPASE - Normal   URINALYSIS W/ MICROSCOPIC IF INDICATED (NO CULTURE) - Normal    Narrative:     Urine microscopic not indicated.   CBC AND DIFFERENTIAL    Narrative:     The following orders were created for panel order CBC & Differential.  Procedure                               Abnormality         Status                     ---------                               -----------         ------                     CBC Auto Differential[[id number]]        Abnormal            Final result                  Please view results for these tests on the individual orders.   EXTRA TUBES    Narrative:     The following orders were created for panel order Extra Tubes.  Procedure                               Abnormality         Status                     ---------                               -----------         ------                     Light Blue Top[[id number]]                                   Final result                 Please view results for these tests on the individual orders.   LIGHT BLUE TOP       Us Ob Transvaginal    Result Date: 1/9/2019  Narrative: Transvaginal pelvic ultrasound OB less than 14 weeks HISTORY: Positive pregnancy test. Right-sided pelvic pain. Cramping. Rule out ectopic pregnancy. Transvaginal ultrasound of the pelvis was performed. COMPARISON: None. Maternal cervix measures 3.48 cm in length. Single live intrauterine pregnancy. Intrauterine gestational sac, yolk sac, fetus and fetal heart activity are present. Amount of amniotic fluid within normal limits. Too early to determine placental location. Fetal heart rate 108.97 beats per minute. Crown-rump length of 0.41 cm corresponds to 6 weeks and 1 day EGA. Gestational age by LMP of 12/1/2018 is 5 weeks and 4 days. Ultrasound [facility] 9/3/2019. Maternal ovaries are unremarkable. No free fluid.     Impression: CONCLUSION: Live 6 weeks and 1 day intrauterine fetus. Fetal heart rate 108.97 beats per minute. No adnexal mass or free fluid. [id number] Electronically signed by:  Feroz Da Silva MD  1/9/2019 8:13 PM [employer] Workstation: [employer]            Final diagnoses:   Pelvic pain in pregnancy, antepartum, first trimester            Henry Martínez MD  01/09/19 0817

## 2019-01-10 NOTE — DISCHARGE INSTRUCTIONS
Take Tylenol as needed.  Drink plenty of fluids.  Continue with prenatal vitamins.  Follow up with OB for reevaluation.  Return to ER for worsening.

## 2019-01-17 ENCOUNTER — TELEPHONE (OUTPATIENT)
Dept: OBSTETRICS AND GYNECOLOGY | Facility: CLINIC | Age: 24
End: 2019-01-17

## 2019-01-17 RX ORDER — ONDANSETRON 8 MG/1
8 TABLET, ORALLY DISINTEGRATING ORAL EVERY 8 HOURS PRN
Qty: 30 TABLET | Refills: 1 | Status: SHIPPED | OUTPATIENT
Start: 2019-01-17 | End: 2019-03-23

## 2019-01-17 RX ORDER — PROMETHAZINE HYDROCHLORIDE 12.5 MG/1
12.5 TABLET ORAL EVERY 6 HOURS PRN
Qty: 30 TABLET | Refills: 1 | Status: SHIPPED | OUTPATIENT
Start: 2019-01-17 | End: 2019-02-11 | Stop reason: SDUPTHER

## 2019-01-17 NOTE — TELEPHONE ENCOUNTER
----- Message from Briana Garcia CNA sent at 1/17/2019 11:02 AM CST -----  Regarding: med  Pt needs zofran please.  She uses [facility] in [place].    Thanks

## 2019-01-28 ENCOUNTER — INITIAL PRENATAL (OUTPATIENT)
Dept: OBSTETRICS AND GYNECOLOGY | Facility: CLINIC | Age: 24
End: 2019-01-28

## 2019-01-28 ENCOUNTER — APPOINTMENT (OUTPATIENT)
Dept: LAB | Facility: HOSPITAL | Age: 24
End: 2019-01-28

## 2019-01-28 VITALS — BODY MASS INDEX: 22.6 KG/M2 | DIASTOLIC BLOOD PRESSURE: 63 MMHG | WEIGHT: 140 LBS | SYSTOLIC BLOOD PRESSURE: 104 MMHG

## 2019-01-28 DIAGNOSIS — Z34.81 PRENATAL CARE, SUBSEQUENT PREGNANCY IN FIRST TRIMESTER: Primary | ICD-10-CM

## 2019-01-28 DIAGNOSIS — O26.899 CRAMPING AFFECTING PREGNANCY, ANTEPARTUM: ICD-10-CM

## 2019-01-28 DIAGNOSIS — O99.331: Primary | ICD-10-CM

## 2019-01-28 DIAGNOSIS — O09.299 PREGNANCY WITH POOR OBSTETRIC HISTORY: ICD-10-CM

## 2019-01-28 DIAGNOSIS — O21.9 NAUSEA/VOMITING IN PREGNANCY: ICD-10-CM

## 2019-01-28 DIAGNOSIS — Z12.4 PAP SMEAR FOR CERVICAL CANCER SCREENING: ICD-10-CM

## 2019-01-28 DIAGNOSIS — R10.9 CRAMPING AFFECTING PREGNANCY, ANTEPARTUM: ICD-10-CM

## 2019-01-28 LAB
ABO GROUP BLD: NORMAL
AMPHET+METHAMPHET UR QL: NEGATIVE
BARBITURATES UR QL SCN: NEGATIVE
BASOPHILS # BLD AUTO: 0.02 10*3/MM3 (ref 0–0.2)
BASOPHILS NFR BLD AUTO: 0.2 % (ref 0–2)
BENZODIAZ UR QL SCN: NEGATIVE
BILIRUB UR QL STRIP: ABNORMAL
BLD GP AB SCN SERPL QL: NEGATIVE
CANNABINOIDS SERPL QL: NEGATIVE
CLARITY UR: CLEAR
COCAINE UR QL: NEGATIVE
COLOR UR: YELLOW
DEPRECATED RDW RBC AUTO: 38.6 FL (ref 36.4–46.3)
EOSINOPHIL # BLD AUTO: 0.3 10*3/MM3 (ref 0–0.7)
EOSINOPHIL NFR BLD AUTO: 2.5 % (ref 0–7)
ERYTHROCYTE [DISTWIDTH] IN BLOOD BY AUTOMATED COUNT: 12.8 % (ref 11.5–14.5)
GLUCOSE UR STRIP-MCNC: NEGATIVE MG/DL
HCT VFR BLD AUTO: 40.5 % (ref 35–45)
HGB BLD-MCNC: 14 G/DL (ref 12–15.5)
HGB UR QL STRIP.AUTO: NEGATIVE
IMM GRANULOCYTES # BLD AUTO: 0.03 10*3/MM3 (ref 0–0.02)
IMM GRANULOCYTES NFR BLD AUTO: 0.2 % (ref 0–0.5)
KETONES UR QL STRIP: ABNORMAL
LEUKOCYTE ESTERASE UR QL STRIP.AUTO: NEGATIVE
LYMPHOCYTES # BLD AUTO: 3.67 10*3/MM3 (ref 0.6–4.2)
LYMPHOCYTES NFR BLD AUTO: 30.3 % (ref 10–50)
Lab: NORMAL
MCH RBC QN AUTO: 28.9 PG (ref 26.5–34)
MCHC RBC AUTO-ENTMCNC: 34.6 G/DL (ref 31.4–36)
MCV RBC AUTO: 83.7 FL (ref 80–98)
METHADONE UR QL SCN: NEGATIVE
MONOCYTES # BLD AUTO: 0.9 10*3/MM3 (ref 0–0.9)
MONOCYTES NFR BLD AUTO: 7.4 % (ref 0–12)
NEUTROPHILS # BLD AUTO: 7.2 10*3/MM3 (ref 2–8.6)
NEUTROPHILS NFR BLD AUTO: 59.4 % (ref 37–80)
NITRITE UR QL STRIP: NEGATIVE
OPIATES UR QL: NEGATIVE
OXYCODONE UR QL SCN: NEGATIVE
PH UR STRIP.AUTO: 6 [PH] (ref 5–9)
PLATELET # BLD AUTO: 202 10*3/MM3 (ref 150–450)
PMV BLD AUTO: 10.8 FL (ref 8–12)
PROT UR QL STRIP: ABNORMAL
RBC # BLD AUTO: 4.84 10*6/MM3 (ref 3.77–5.16)
RH BLD: POSITIVE
SP GR UR STRIP: 1.03 (ref 1–1.03)
UROBILINOGEN UR QL STRIP: ABNORMAL
WBC NRBC COR # BLD: 12.12 10*3/MM3 (ref 3.2–9.8)

## 2019-01-28 PROCEDURE — 81003 URINALYSIS AUTO W/O SCOPE: CPT | Performed by: NURSE PRACTITIONER

## 2019-01-28 PROCEDURE — 80307 DRUG TEST PRSMV CHEM ANLYZR: CPT | Performed by: NURSE PRACTITIONER

## 2019-01-28 PROCEDURE — 85660 RBC SICKLE CELL TEST: CPT | Performed by: NURSE PRACTITIONER

## 2019-01-28 PROCEDURE — 87591 N.GONORRHOEAE DNA AMP PROB: CPT | Performed by: NURSE PRACTITIONER

## 2019-01-28 PROCEDURE — 86787 VARICELLA-ZOSTER ANTIBODY: CPT | Performed by: NURSE PRACTITIONER

## 2019-01-28 PROCEDURE — 87491 CHLMYD TRACH DNA AMP PROBE: CPT | Performed by: NURSE PRACTITIONER

## 2019-01-28 PROCEDURE — 86803 HEPATITIS C AB TEST: CPT | Performed by: NURSE PRACTITIONER

## 2019-01-28 PROCEDURE — 99214 OFFICE O/P EST MOD 30 MIN: CPT | Performed by: NURSE PRACTITIONER

## 2019-01-28 PROCEDURE — 83021 HEMOGLOBIN CHROMOTOGRAPHY: CPT | Performed by: NURSE PRACTITIONER

## 2019-01-28 PROCEDURE — 85025 COMPLETE CBC W/AUTO DIFF WBC: CPT | Performed by: NURSE PRACTITIONER

## 2019-01-28 PROCEDURE — 86900 BLOOD TYPING SEROLOGIC ABO: CPT | Performed by: NURSE PRACTITIONER

## 2019-01-28 PROCEDURE — 86901 BLOOD TYPING SEROLOGIC RH(D): CPT | Performed by: NURSE PRACTITIONER

## 2019-01-28 PROCEDURE — 86850 RBC ANTIBODY SCREEN: CPT | Performed by: NURSE PRACTITIONER

## 2019-01-28 PROCEDURE — 87086 URINE CULTURE/COLONY COUNT: CPT | Performed by: NURSE PRACTITIONER

## 2019-01-28 PROCEDURE — 87661 TRICHOMONAS VAGINALIS AMPLIF: CPT | Performed by: NURSE PRACTITIONER

## 2019-01-28 PROCEDURE — G0432 EIA HIV-1/HIV-2 SCREEN: HCPCS | Performed by: NURSE PRACTITIONER

## 2019-01-28 PROCEDURE — 36415 COLL VENOUS BLD VENIPUNCTURE: CPT

## 2019-01-29 LAB
C TRACH RRNA CVX QL NAA+PROBE: NEGATIVE
HGB A MFR BLD: 97.2 % (ref 96.4–98.8)
HGB A2 MFR BLD COLUMN CHROM: 2.8 % (ref 1.8–3.2)
HGB C MFR BLD: 0 %
HGB F MFR BLD: 0 % (ref 0–2)
HGB FRACT BLD-IMP: NORMAL
HGB S BLD QL SOLY: NEGATIVE
HGB S MFR BLD: 0 %
N GONORRHOEA RRNA SPEC QL NAA+PROBE: NEGATIVE
TRICHOMONAS VAGINALIS PCR: NEGATIVE
VZV IGG SER IA-ACNC: <135 INDEX

## 2019-01-30 LAB
BACTERIA SPEC AEROBE CULT: NORMAL
HBV SURFACE AG SERPL QL IA: NEGATIVE
HCV AB SER DONR QL: NEGATIVE
HIV1+2 AB SER QL: NEGATIVE
RPR SER QL: NORMAL
RUBV IGG SER QL: ABNORMAL
RUBV IGG SER-ACNC: 145 IU/ML (ref 0–9.9)

## 2019-02-11 ENCOUNTER — ROUTINE PRENATAL (OUTPATIENT)
Dept: OBSTETRICS AND GYNECOLOGY | Facility: CLINIC | Age: 24
End: 2019-02-11

## 2019-02-11 ENCOUNTER — RESULTS ENCOUNTER (OUTPATIENT)
Dept: OBSTETRICS AND GYNECOLOGY | Facility: CLINIC | Age: 24
End: 2019-02-11

## 2019-02-11 VITALS — BODY MASS INDEX: 22.44 KG/M2 | DIASTOLIC BLOOD PRESSURE: 50 MMHG | WEIGHT: 139 LBS | SYSTOLIC BLOOD PRESSURE: 111 MMHG

## 2019-02-11 DIAGNOSIS — Z34.81 PRENATAL CARE, SUBSEQUENT PREGNANCY IN FIRST TRIMESTER: ICD-10-CM

## 2019-02-11 DIAGNOSIS — O99.331 TOBACCO SMOKING AFFECTING PREGNANCY IN FIRST TRIMESTER: ICD-10-CM

## 2019-02-11 DIAGNOSIS — Z30.09 UNWANTED FERTILITY: ICD-10-CM

## 2019-02-11 DIAGNOSIS — Z3A.10 10 WEEKS GESTATION OF PREGNANCY: Primary | ICD-10-CM

## 2019-02-11 DIAGNOSIS — O21.9 NAUSEA AND VOMITING DURING PREGNANCY PRIOR TO 22 WEEKS GESTATION: ICD-10-CM

## 2019-02-11 DIAGNOSIS — R55 VASOVAGAL SYNCOPE: ICD-10-CM

## 2019-02-11 PROBLEM — O41.8X10 SUBCHORIONIC HEMORRHAGE IN FIRST TRIMESTER: Status: ACTIVE | Noted: 2019-02-11

## 2019-02-11 PROBLEM — O46.8X1 SUBCHORIONIC HEMORRHAGE IN FIRST TRIMESTER: Status: ACTIVE | Noted: 2019-02-11

## 2019-02-11 PROCEDURE — 99213 OFFICE O/P EST LOW 20 MIN: CPT | Performed by: NURSE PRACTITIONER

## 2019-02-11 RX ORDER — PNV NO.118/IRON FUMARATE/FA 29 MG-1 MG
1 TABLET,CHEWABLE ORAL DAILY
Qty: 30 TABLET | Refills: 12 | Status: SHIPPED | OUTPATIENT
Start: 2019-02-11 | End: 2019-03-23

## 2019-02-11 RX ORDER — PROMETHAZINE HYDROCHLORIDE 12.5 MG/1
12.5 TABLET ORAL EVERY 6 HOURS PRN
Qty: 30 TABLET | Refills: 1 | Status: SHIPPED | OUTPATIENT
Start: 2019-02-11 | End: 2019-03-23

## 2019-02-11 NOTE — PROGRESS NOTES
CC: NOB appt: hx reviewed and updated    HPI: Second pregnancy: hx of 1 SAB in 2017.  Pt reports Hx of neurocardiogenic syncope dx when she was 17 yo by Pediatric [facility] in [place], [place].  Pt last syncope episode was in early 2018. Pt is going to establish care with [facility] and currently awaiting for her appt.      ROS: Denies heartburn, dysuria, vb, LOF, abnormal vaginal d/c or constipation. Reports n/v daily that is relieved by phenergan.        P/E: see vitals     A/P: 23 y.o.  at 10w2d by exact LMP    1. NOB care  -Encourage PNV   -SAB precautions  - A+, RI, HIV neg, RPR NR, HBsAg neg, GC/CT neg/neg  -Discussed OB providers  -Counsyl test drawn today  -RTC in 4 weeks for CLOTILDE    1. Syncope  -hx of neurocardiogenic syncope  -pt estab care with [facility]  -Avoidance of known triggers, slow position changes    2. Unwanted fertility  -pt requesting TL after delivery    3. Smoker  -pt down to 4-5 cigarettes per day  -education provided on importance of smoking cessation    4. N/V  -eat small frequent meals  -take phenergan prn

## 2019-02-12 NOTE — PROGRESS NOTES
Dating/Viability reviewed: 1 intrauterine fetus, fetal pole visualized, placenta: posterior, small subchorionic hemorrhage  2.64x1.74x.97cm to left of GS, Cervical length: 3.66cm.  Gestational age by: LMP 10w2d with [facility] 09/07/2019

## 2019-02-13 DIAGNOSIS — O26.899 CRAMPING AFFECTING PREGNANCY, ANTEPARTUM: ICD-10-CM

## 2019-02-13 DIAGNOSIS — O09.299 PREGNANCY WITH POOR OBSTETRIC HISTORY: ICD-10-CM

## 2019-02-13 DIAGNOSIS — R10.9 CRAMPING AFFECTING PREGNANCY, ANTEPARTUM: ICD-10-CM

## 2019-02-15 NOTE — PROGRESS NOTES
Kristal Moreno is a 23 y.o. year old .  Patient's last menstrual period was 2018 (exact date).  She presents to be seen to initiate prenatal care.  She was seen in the ER for pelvic pain with RLQ pain on  and had US with early gestation IUP @ 6w1d and FHR of 108.  She has hx of MAB in 2017.  She denies any VB but has had a lot of N/V.    No chief complaint on file.       Social History    Tobacco Use      Smoking status: Current Every Day Smoker        Packs/day: 0.25        Years: 5.00        Pack years: 1.25        Types: Cigarettes      Smokeless tobacco: Never Used      Review of Systems   Constitutional: Positive for fatigue.   Gastrointestinal: Positive for nausea and vomiting.   Genitourinary: Positive for pelvic pain.   All other systems reviewed and are negative.       The following portions of the patient's history were reviewed and updated as appropriate: allergies, current medications, past family history, past medical history, past social history, past surgical history and problem list.    No Known Allergies       Current Outpatient Medications:   •  ondansetron ODT (ZOFRAN-ODT) 8 MG disintegrating tablet, Take 1 tablet by mouth Every 8 (Eight) Hours As Needed for Nausea or Vomiting., Disp: 30 tablet, Rfl: 1  •  Prenatal Vit-Fe Fumarate-FA (PRENATAL 19) chewable tablet, Chew 1 tablet Daily., Disp: 30 tablet, Rfl: 12  •  promethazine (PHENERGAN) 12.5 MG tablet, Take 1 tablet by mouth Every 6 (Six) Hours As Needed for Nausea or Vomiting., Disp: 30 tablet, Rfl: 1     The patient has a family history of   Family History   Problem Relation Age of Onset   • Breast cancer Other    • Lung cancer Other    • Stroke Other    • No Known Problems Mother    • No Known Problems Paternal Grandmother    • Lung cancer Maternal Grandmother    • Cancer Maternal Grandfather    • Hypotension Maternal Grandfather    • No Known Problems Brother    • No Known Problems Brother    • No Known Problems  Brother    • No Known Problems Sister         Social History    Tobacco Use      Smoking status: Current Every Day Smoker        Packs/day: 0.25        Years: 5.00        Pack years: 1.25        Types: Cigarettes      Smokeless tobacco: Never Used      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; sent for evaluation      • Upper respiratory infection    • Urinary tract infectious disease    • Wheezing         OB History      Para Term  AB Living    2 0 0 0 1 0    SAB TAB Ectopic Molar Multiple Live Births    1 0 0   0             Social History     Socioeconomic History   • Marital status: Single     Spouse name: Not on file   • Number of children: Not on file   • Years of education: Not on file   • Highest education level: Not on file   Social Needs   • Financial resource strain: Not on file   • Food insecurity - worry: Not on file   • Food insecurity - inability: Not on file   • Transportation needs - medical: Not on file   • Transportation needs - non-medical: Not on file   Occupational History   • Not on file   Tobacco Use   • Smoking status: Current Every Day Smoker     Packs/day: 0.25     Years: 5.00     Pack years: 1.25     Types: Cigarettes   • Smokeless tobacco: Never Used   Substance and Sexual Activity   • Alcohol use: No   • Drug use: No   • Sexual activity: Yes     Partners: Male     Birth control/protection: None   Other Topics Concern   • Not on file   Social History Narrative   • Not on file        Past Surgical History:   Procedure Laterality Date   • ADENOIDECTOMY     • INJECTION OF MEDICATION   12/19/2012    Depo 150 mg (4)      • INJECTION OF MEDICATION  03/22/2013    Depo Provera (medroxyprogesterone acetate) (1)      • INJECTION OF MEDICATION  12/19/2011    Phenergan (1)      • INJECTION OF MEDICATION  12/19/2011    Toradol (2)      • TONSILLECTOMY          Patient Active Problem List   Diagnosis   • Syncopal episodes   • Subchorionic hemorrhage in first trimester        Physical Exam   Constitutional: She is oriented to person, place, and time. She appears well-developed and well-nourished.   HENT:   Head: Normocephalic and atraumatic.   Eyes: EOM are normal. Pupils are equal, round, and reactive to light.   Neck: Normal range of motion. Neck supple.   Cardiovascular: Normal rate, regular rhythm and normal heart sounds.   Pulmonary/Chest: Effort normal and breath sounds normal.   Abdominal: Soft.   Musculoskeletal: Normal range of motion.   Neurological: She is alert and oriented to person, place, and time.   Skin: Skin is warm and dry.   Psychiatric: She has a normal mood and affect. Her behavior is normal. Judgment and thought content normal.   Nursing note and vitals reviewed.         Assessment/Plan     ASSESSMENT  1. IUP at 8w6d  2. Hx MAB  3. N/V  4. Tobacco use    PLAN  1. Tests ordered today:  Orders Placed This Encounter   Procedures   • US Ob Limited 1 + Fetuses     Standing Status:   Future     Number of Occurrences:   1     Standing Expiration Date:   1/28/2020     Order Specific Question:   Reason for Exam:     Answer:   f/u FHR     2. Medications prescribed today:  No orders of the defined types were placed in this encounter.      Follow up: 1 week(s) for f/u US and CLOTILDE visit

## 2019-03-03 ENCOUNTER — HOSPITAL ENCOUNTER (EMERGENCY)
Facility: HOSPITAL | Age: 24
Discharge: HOME | End: 2019-03-04
Attending: FAMILY MEDICINE | Admitting: FAMILY MEDICINE

## 2019-03-03 DIAGNOSIS — R06.02 SHORTNESS OF BREATH: Primary | ICD-10-CM

## 2019-03-03 PROCEDURE — 93010 ELECTROCARDIOGRAM REPORT: CPT | Performed by: INTERNAL MEDICINE

## 2019-03-03 PROCEDURE — 93005 ELECTROCARDIOGRAM TRACING: CPT | Performed by: FAMILY MEDICINE

## 2019-03-03 PROCEDURE — 93005 ELECTROCARDIOGRAM TRACING: CPT

## 2019-03-03 PROCEDURE — 99283 EMERGENCY DEPT VISIT LOW MDM: CPT

## 2019-03-04 ENCOUNTER — APPOINTMENT (OUTPATIENT)
Dept: GENERAL RADIOLOGY | Facility: HOSPITAL | Age: 24
End: 2019-03-04

## 2019-03-04 VITALS
DIASTOLIC BLOOD PRESSURE: 66 MMHG | HEIGHT: 67 IN | SYSTOLIC BLOOD PRESSURE: 106 MMHG | HEART RATE: 77 BPM | RESPIRATION RATE: 16 BRPM | TEMPERATURE: 98.5 F | OXYGEN SATURATION: 100 % | WEIGHT: 143 LBS | BODY MASS INDEX: 22.44 KG/M2

## 2019-03-04 LAB
ALBUMIN SERPL-MCNC: 3.7 G/DL (ref 3.4–4.8)
ALBUMIN/GLOB SERPL: 1.1 G/DL (ref 1.1–1.8)
ALP SERPL-CCNC: 57 U/L (ref 38–126)
ALT SERPL W P-5'-P-CCNC: 9 U/L (ref 9–52)
ANION GAP SERPL CALCULATED.3IONS-SCNC: 13 MMOL/L (ref 5–15)
AST SERPL-CCNC: 11 U/L (ref 14–36)
BILIRUB SERPL-MCNC: 0.2 MG/DL (ref 0.2–1.3)
BILIRUB UR QL STRIP: NEGATIVE
BUN BLD-MCNC: 3 MG/DL (ref 7–21)
BUN/CREAT SERPL: 7.7 (ref 7–25)
CALCIUM SPEC-SCNC: 9.2 MG/DL (ref 8.4–10.2)
CHLORIDE SERPL-SCNC: 104 MMOL/L (ref 95–110)
CLARITY UR: CLEAR
CO2 SERPL-SCNC: 18 MMOL/L (ref 22–31)
COLOR UR: YELLOW
CREAT BLD-MCNC: 0.39 MG/DL (ref 0.5–1)
D-DIMER, QUANTITATIVE (MAD,POW, STR): 850 NG/ML (FEU) (ref 0–470)
DEPRECATED RDW RBC AUTO: 38.8 FL (ref 37–54)
EOSINOPHIL # BLD MANUAL: 0.13 10*3/MM3 (ref 0–0.4)
EOSINOPHIL NFR BLD MANUAL: 1 % (ref 0.3–6.2)
ERYTHROCYTE [DISTWIDTH] IN BLOOD BY AUTOMATED COUNT: 13.1 % (ref 12.3–15.4)
FLUAV AG NPH QL: NEGATIVE
FLUBV AG NPH QL IA: NEGATIVE
GFR SERPL CREATININE-BSD FRML MDRD: 206 ML/MIN/1.73 (ref 71–165)
GLOBULIN UR ELPH-MCNC: 3.5 GM/DL (ref 2.3–3.5)
GLUCOSE BLD-MCNC: 83 MG/DL (ref 60–100)
GLUCOSE UR STRIP-MCNC: NEGATIVE MG/DL
HCT VFR BLD AUTO: 34.4 % (ref 34–46.6)
HGB BLD-MCNC: 12.2 G/DL (ref 12–15.9)
HGB UR QL STRIP.AUTO: NEGATIVE
HOLD SPECIMEN: NORMAL
KETONES UR QL STRIP: NEGATIVE
LEUKOCYTE ESTERASE UR QL STRIP.AUTO: NEGATIVE
LIPASE SERPL-CCNC: 41 U/L (ref 23–300)
LYMPHOCYTES # BLD MANUAL: 4.62 10*3/MM3 (ref 0.7–3.1)
LYMPHOCYTES NFR BLD MANUAL: 35 % (ref 19.6–45.3)
LYMPHOCYTES NFR BLD MANUAL: 4 % (ref 5–12)
MAGNESIUM SERPL-MCNC: 1.8 MG/DL (ref 1.6–2.3)
MCH RBC QN AUTO: 29.2 PG (ref 26.6–33)
MCHC RBC AUTO-ENTMCNC: 35.5 G/DL (ref 31.5–35.7)
MCV RBC AUTO: 82.3 FL (ref 79–97)
MONOCYTES # BLD AUTO: 0.53 10*3/MM3 (ref 0.1–0.9)
NEUTROPHILS # BLD AUTO: 7.92 10*3/MM3 (ref 1.4–7)
NEUTROPHILS NFR BLD MANUAL: 60 % (ref 42.7–76)
NITRITE UR QL STRIP: NEGATIVE
PH UR STRIP.AUTO: 7 [PH] (ref 5–9)
PLATELET # BLD AUTO: 219 10*3/MM3 (ref 140–450)
PMV BLD AUTO: 11.2 FL (ref 6–12)
POTASSIUM BLD-SCNC: 3.3 MMOL/L (ref 3.5–5.1)
PROT SERPL-MCNC: 7.2 G/DL (ref 6.3–8.6)
PROT UR QL STRIP: NEGATIVE
RBC # BLD AUTO: 4.18 10*6/MM3 (ref 3.77–5.28)
RBC MORPH BLD: NORMAL
SMALL PLATELETS BLD QL SMEAR: ADEQUATE
SODIUM BLD-SCNC: 135 MMOL/L (ref 137–145)
SP GR UR STRIP: 1.01 (ref 1–1.03)
UROBILINOGEN UR QL STRIP: NORMAL
WBC MORPH BLD: NORMAL
WBC NRBC COR # BLD: 13.2 10*3/MM3 (ref 3.4–10.8)

## 2019-03-04 PROCEDURE — 85379 FIBRIN DEGRADATION QUANT: CPT | Performed by: FAMILY MEDICINE

## 2019-03-04 PROCEDURE — 83735 ASSAY OF MAGNESIUM: CPT | Performed by: FAMILY MEDICINE

## 2019-03-04 PROCEDURE — 83690 ASSAY OF LIPASE: CPT | Performed by: FAMILY MEDICINE

## 2019-03-04 PROCEDURE — 71045 X-RAY EXAM CHEST 1 VIEW: CPT

## 2019-03-04 PROCEDURE — 85007 BL SMEAR W/DIFF WBC COUNT: CPT | Performed by: FAMILY MEDICINE

## 2019-03-04 PROCEDURE — 80053 COMPREHEN METABOLIC PANEL: CPT | Performed by: FAMILY MEDICINE

## 2019-03-04 PROCEDURE — 87804 INFLUENZA ASSAY W/OPTIC: CPT | Performed by: FAMILY MEDICINE

## 2019-03-04 PROCEDURE — 85025 COMPLETE CBC W/AUTO DIFF WBC: CPT | Performed by: FAMILY MEDICINE

## 2019-03-04 PROCEDURE — 81003 URINALYSIS AUTO W/O SCOPE: CPT | Performed by: FAMILY MEDICINE

## 2019-03-04 RX ORDER — ONDANSETRON 4 MG/1
4 TABLET, ORALLY DISINTEGRATING ORAL ONCE
Status: COMPLETED | OUTPATIENT
Start: 2019-03-04 | End: 2019-03-04

## 2019-03-04 RX ORDER — PROMETHAZINE HYDROCHLORIDE 25 MG/ML
12.5 INJECTION, SOLUTION INTRAMUSCULAR; INTRAVENOUS ONCE
Status: DISCONTINUED | OUTPATIENT
Start: 2019-03-04 | End: 2019-03-04

## 2019-03-04 RX ADMIN — ONDANSETRON 4 MG: 4 TABLET, ORALLY DISINTEGRATING ORAL at 02:06

## 2019-03-04 NOTE — ED PROVIDER NOTES
Subjective   Pt states that she has been having intermittent episodes of shortness of breath for the past 3 hours.  She states that she is currently 13 weeks pregnant and [facility], and has FU on 3/11/19.  She has had a bedside ultrasound to confirm the location of her pregnancy which is an IUP.          Shortness of Breath   Severity:  Mild  Onset quality:  Unable to specify  Duration:  3 hours  Timing:  Intermittent  Progression:  Resolved  Chronicity:  New  Relieved by:  Nothing  Worsened by:  Nothing  Ineffective treatments:  None tried  Associated symptoms: no abdominal pain, no chest pain, no claudication, no cough, no diaphoresis, no ear pain, no fever, no headaches, no hemoptysis, no neck pain, no rash, no sore throat, no sputum production, no syncope, no swollen glands, no vomiting and no wheezing    Risk factors: no obesity    Pregnancy Problem   Patient reports no abdominal pain.   Associated symptoms include shortness of breath.   Associated symptoms include no dysuria, no fever, no headaches, no light-headedness, no nausea, no syncope, no vomiting and no weakness.       Review of Systems   Constitutional: Negative for appetite change, chills, diaphoresis, fatigue and fever.   HENT: Negative for congestion, ear discharge, ear pain, nosebleeds, rhinorrhea, sinus pressure, sore throat and trouble swallowing.    Eyes: Negative for discharge and redness.   Respiratory: Positive for shortness of breath. Negative for apnea, cough, hemoptysis, sputum production, choking, chest tightness, wheezing and stridor.    Cardiovascular: Negative for chest pain, claudication and syncope.   Gastrointestinal: Negative for abdominal pain, diarrhea, nausea and vomiting.   Endocrine: Negative for polyuria.   Genitourinary: Negative for dysuria, frequency and urgency.   Musculoskeletal: Negative for myalgias and neck pain.   Skin: Negative for color change and rash.   Allergic/Immunologic:  Negative for immunocompromised state.   Neurological: Negative for dizziness, seizures, syncope, weakness, light-headedness and headaches.   Hematological: Negative for adenopathy. Does not bruise/bleed easily.   Psychiatric/Behavioral: Negative for behavioral problems and confusion.   All other systems reviewed and are negative.      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; sent for evaluation      • Upper respiratory infection    • Urinary tract infectious disease    • Wheezing        No Known Allergies    Past Surgical History:   Procedure Laterality Date   • ADENOIDECTOMY     • INJECTION OF MEDICATION  12/19/2012    Depo 150 mg (4)      • INJECTION OF MEDICATION  03/22/2013    Depo Provera (medroxyprogesterone acetate) (1)      • INJECTION OF MEDICATION  12/19/2011    Phenergan (1)      • INJECTION OF MEDICATION  12/19/2011    Toradol (2)      • TONSILLECTOMY         Family History   Problem Relation Age of Onset   • Breast cancer Other    • Lung cancer Other    • Stroke Other    • No Known Problems Mother    • No Known Problems Paternal Grandmother    • Lung cancer Maternal Grandmother    • Cancer Maternal Grandfather    • Hypotension Maternal Grandfather    • No Known Problems Brother    • No Known Problems Brother    • No Known Problems Brother    • No Known Problems Sister        Social History     Socioeconomic History   • Marital status: Single     Spouse name: Not on file   • Number of children: Not on file   • Years of education: Not on file   • Highest education level: Not on file   Tobacco Use   • Smoking  status: Current Every Day Smoker     Packs/day: 0.25     Years: 5.00     Pack years: 1.25     Types: Cigarettes   • Smokeless tobacco: Never Used   Substance and Sexual Activity   • Alcohol use: No   • Drug use: No   • Sexual activity: Yes     Partners: Male     Birth control/protection: None           Objective   Physical Exam   Constitutional: She is oriented to person, place, and time. She appears well-developed and well-nourished.   HENT:   Head: Normocephalic and atraumatic.   Nose: Nose normal.   Mouth/Throat: Oropharynx is clear and moist.   Eyes: Conjunctivae and EOM are normal. Pupils are equal, round, and reactive to light. Right eye exhibits no discharge. Left eye exhibits no discharge. No scleral icterus.   Neck: Normal range of motion. Neck supple. No tracheal deviation present.   Cardiovascular: Normal rate, regular rhythm and normal heart sounds.   No murmur heard.  Pulmonary/Chest: Effort normal and breath sounds normal. No stridor. No respiratory distress. She has no wheezes. She has no rales.   Abdominal: Soft. Bowel sounds are normal. She exhibits no distension and no mass. There is no tenderness. There is no rebound and no guarding.   Musculoskeletal: She exhibits no edema.   Neurological: She is alert and oriented to person, place, and time. Coordination normal.   Skin: Skin is warm and dry. No rash noted. No erythema.   Psychiatric: She has a normal mood and affect. Her behavior is normal. Thought content normal.   Nursing note and vitals reviewed.      ECG 12 Lead    Date/Time: 3/4/2019 2:00 AM  Performed by: Ethan Delgado MD  Authorized by: Ethan Delgado MD   Interpreted by physician  Rhythm: sinus rhythm  Rate: normal  BPM: 88  ST Segments: ST segments normal                   ED Course      Patient has complaints of intermittent shortness of breath that resolved spontaneously.  EKG does not demonstrate S1 every 3 T3 pattern, patient presents emergency department with normal  vital signs, O2 sats 100%, heart rate in the upper 70s, BP consistent with the pregnancy state.  Patient has a very low pretest probability of PE, including no chest pain, and no significant shortness of breath.  Mild elevation of d-dimer most likely secondary to her pregnancy.  CT imaging discussed with patient but she agrees to wait and see for now and will return should any of her symptoms worsen.  [facility] with the [facility] and 7 days.            MDM      Final diagnoses:   Shortness of breath            Ethan Delgado MD  03/04/19 0202       Ethan Delgado MD  03/04/19 0203

## 2019-03-07 ENCOUNTER — APPOINTMENT (OUTPATIENT)
Dept: LAB | Facility: HOSPITAL | Age: 24
End: 2019-03-07

## 2019-03-07 ENCOUNTER — ROUTINE PRENATAL (OUTPATIENT)
Dept: OBSTETRICS AND GYNECOLOGY | Facility: CLINIC | Age: 24
End: 2019-03-07

## 2019-03-07 VITALS — DIASTOLIC BLOOD PRESSURE: 83 MMHG | WEIGHT: 137 LBS | SYSTOLIC BLOOD PRESSURE: 123 MMHG | BODY MASS INDEX: 21.46 KG/M2

## 2019-03-07 DIAGNOSIS — R20.0 NUMBNESS AND TINGLING OF UPPER AND LOWER EXTREMITIES OF BOTH SIDES: ICD-10-CM

## 2019-03-07 DIAGNOSIS — Z3A.13 13 WEEKS GESTATION OF PREGNANCY: Primary | ICD-10-CM

## 2019-03-07 DIAGNOSIS — R42 WEAKNESS WITH DIZZINESS: Primary | ICD-10-CM

## 2019-03-07 DIAGNOSIS — R53.1 WEAKNESS WITH DIZZINESS: Primary | ICD-10-CM

## 2019-03-07 DIAGNOSIS — R20.2 NUMBNESS AND TINGLING OF UPPER AND LOWER EXTREMITIES OF BOTH SIDES: ICD-10-CM

## 2019-03-07 LAB
ALBUMIN SERPL-MCNC: 4.1 G/DL (ref 3.4–4.8)
ALBUMIN/GLOB SERPL: 1.2 G/DL (ref 1.1–1.8)
ALP SERPL-CCNC: 58 U/L (ref 38–126)
ALT SERPL W P-5'-P-CCNC: <6 U/L (ref 9–52)
ANION GAP SERPL CALCULATED.3IONS-SCNC: 9 MMOL/L (ref 5–15)
AST SERPL-CCNC: 15 U/L (ref 14–36)
BILIRUB SERPL-MCNC: 0.3 MG/DL (ref 0.2–1.3)
BUN BLD-MCNC: 8 MG/DL (ref 7–21)
BUN/CREAT SERPL: 16 (ref 7–25)
CALCIUM SPEC-SCNC: 9.7 MG/DL (ref 8.4–10.2)
CHLORIDE SERPL-SCNC: 101 MMOL/L (ref 95–110)
CO2 SERPL-SCNC: 24 MMOL/L (ref 22–31)
CREAT BLD-MCNC: 0.5 MG/DL (ref 0.5–1)
DEPRECATED RDW RBC AUTO: 40.7 FL (ref 37–54)
ERYTHROCYTE [DISTWIDTH] IN BLOOD BY AUTOMATED COUNT: 13.2 % (ref 12.3–15.4)
GFR SERPL CREATININE-BSD FRML MDRD: 154 ML/MIN/1.73 (ref 71–165)
GLOBULIN UR ELPH-MCNC: 3.5 GM/DL (ref 2.3–3.5)
GLUCOSE BLD-MCNC: 81 MG/DL (ref 60–100)
HCT VFR BLD AUTO: 38.4 % (ref 34–46.6)
HGB BLD-MCNC: 12.8 G/DL (ref 12–15.9)
MCH RBC QN AUTO: 28.1 PG (ref 26.6–33)
MCHC RBC AUTO-ENTMCNC: 33.3 G/DL (ref 31.5–35.7)
MCV RBC AUTO: 84.2 FL (ref 79–97)
PLATELET # BLD AUTO: 230 10*3/MM3 (ref 140–450)
PMV BLD AUTO: 11.1 FL (ref 6–12)
POTASSIUM BLD-SCNC: 3.8 MMOL/L (ref 3.5–5.1)
PROT SERPL-MCNC: 7.6 G/DL (ref 6.3–8.6)
RBC # BLD AUTO: 4.56 10*6/MM3 (ref 3.77–5.28)
SODIUM BLD-SCNC: 134 MMOL/L (ref 137–145)
TSH SERPL DL<=0.05 MIU/L-ACNC: 1.05 MIU/ML (ref 0.46–4.68)
WBC NRBC COR # BLD: 11.86 10*3/MM3 (ref 3.4–10.8)

## 2019-03-07 PROCEDURE — 36415 COLL VENOUS BLD VENIPUNCTURE: CPT | Performed by: NURSE PRACTITIONER

## 2019-03-07 PROCEDURE — 80050 GENERAL HEALTH PANEL: CPT | Performed by: NURSE PRACTITIONER

## 2019-03-07 RX ORDER — PROMETHAZINE HYDROCHLORIDE 12.5 MG/1
12.5 TABLET ORAL EVERY 6 HOURS PRN
Qty: 60 TABLET | Refills: 2 | Status: SHIPPED | OUTPATIENT
Start: 2019-03-07 | End: 2019-08-24 | Stop reason: HOSPADM

## 2019-03-07 RX ORDER — METOCLOPRAMIDE 5 MG/1
5 TABLET ORAL 3 TIMES DAILY PRN
Qty: 90 TABLET | Refills: 0 | Status: SHIPPED | OUTPATIENT
Start: 2019-03-07 | End: 2019-04-06

## 2019-03-07 RX ORDER — DOCUSATE SODIUM 100 MG/1
100 CAPSULE, LIQUID FILLED ORAL 2 TIMES DAILY
Qty: 60 CAPSULE | Refills: 1 | Status: SHIPPED | OUTPATIENT
Start: 2019-03-07 | End: 2019-07-11

## 2019-03-07 RX ORDER — ONDANSETRON 4 MG/1
4 TABLET, ORALLY DISINTEGRATING ORAL EVERY 8 HOURS PRN
Qty: 30 TABLET | Refills: 3 | Status: SHIPPED | OUTPATIENT
Start: 2019-03-07 | End: 2019-04-08

## 2019-03-07 RX ORDER — LACTOBACILLUS COMBINATION NO.9 4B CELL
1 CAPSULE ORAL DAILY
Qty: 60 TABLET | Refills: 12 | Status: SHIPPED | OUTPATIENT
Start: 2019-03-07 | End: 2019-03-07 | Stop reason: SDUPTHER

## 2019-03-07 RX ORDER — LACTOBACILLUS COMBINATION NO.9 4B CELL
1 CAPSULE ORAL DAILY
Qty: 60 TABLET | Refills: 12 | Status: SHIPPED | OUTPATIENT
Start: 2019-03-07 | End: 2020-01-02

## 2019-03-11 DIAGNOSIS — Z36.89 ENCOUNTER FOR FETAL ANATOMIC SURVEY: Primary | ICD-10-CM

## 2019-03-22 ENCOUNTER — TELEPHONE (OUTPATIENT)
Dept: OBSTETRICS AND GYNECOLOGY | Facility: CLINIC | Age: 24
End: 2019-03-22

## 2019-03-23 PROCEDURE — 87510 GARDNER VAG DNA DIR PROBE: CPT | Performed by: NURSE PRACTITIONER

## 2019-03-23 PROCEDURE — 87480 CANDIDA DNA DIR PROBE: CPT | Performed by: NURSE PRACTITIONER

## 2019-03-23 PROCEDURE — 87660 TRICHOMONAS VAGIN DIR PROBE: CPT | Performed by: NURSE PRACTITIONER

## 2019-04-08 ENCOUNTER — ROUTINE PRENATAL (OUTPATIENT)
Dept: OBSTETRICS AND GYNECOLOGY | Facility: CLINIC | Age: 24
End: 2019-04-08

## 2019-04-08 VITALS — BODY MASS INDEX: 23.08 KG/M2 | WEIGHT: 143 LBS | DIASTOLIC BLOOD PRESSURE: 60 MMHG | SYSTOLIC BLOOD PRESSURE: 100 MMHG

## 2019-04-08 DIAGNOSIS — O21.9 NAUSEA AND VOMITING DURING PREGNANCY PRIOR TO 22 WEEKS GESTATION: ICD-10-CM

## 2019-04-08 DIAGNOSIS — Z36.89 ENCOUNTER FOR FETAL ANATOMIC SURVEY: ICD-10-CM

## 2019-04-08 DIAGNOSIS — Z3A.18 18 WEEKS GESTATION OF PREGNANCY: Primary | ICD-10-CM

## 2019-04-08 DIAGNOSIS — O99.332 TOBACCO USE AFFECTING PREGNANCY IN SECOND TRIMESTER, ANTEPARTUM: ICD-10-CM

## 2019-04-08 PROCEDURE — 99213 OFFICE O/P EST LOW 20 MIN: CPT | Performed by: NURSE PRACTITIONER

## 2019-04-08 RX ORDER — ONDANSETRON 4 MG/1
4 TABLET, FILM COATED ORAL EVERY 8 HOURS PRN
Qty: 30 TABLET | Refills: 2 | Status: SHIPPED | OUTPATIENT
Start: 2019-04-08 | End: 2019-08-24 | Stop reason: HOSPADM

## 2019-04-08 NOTE — PROGRESS NOTES
CC: CLOTILDE care, hx reviewed    ROS: Denies cramping, dysuria, or vaginal bleeding.      P/E: see vitals  Reviewed preliminary anatomy us report: placenta is posterior, AFV WNL, EFW: 10 oz, all anatomy seen with normal appearance, CL 4.52 cm, 3vc, its a girl!    A/P: 25 yo  @ 18w2d per LMP c/w 10 week scan    1. CLOTILDE care  -continue pnv  -RTC in 4 weeks for CLOTILDE care    2. Desires tubal  -will sign papers around 28 weeks    3. Tobacco use  -smokes 0.25 ppd  -encouraged complete cessation  -

## 2019-05-16 ENCOUNTER — ROUTINE PRENATAL (OUTPATIENT)
Dept: OBSTETRICS AND GYNECOLOGY | Facility: CLINIC | Age: 24
End: 2019-05-16

## 2019-05-16 VITALS — BODY MASS INDEX: 25.21 KG/M2 | DIASTOLIC BLOOD PRESSURE: 62 MMHG | WEIGHT: 156.2 LBS | SYSTOLIC BLOOD PRESSURE: 108 MMHG

## 2019-05-16 DIAGNOSIS — Z3A.23 23 WEEKS GESTATION OF PREGNANCY: Primary | ICD-10-CM

## 2019-05-16 DIAGNOSIS — R55 VASOVAGAL SYNCOPE: ICD-10-CM

## 2019-05-16 DIAGNOSIS — M54.9 BACK PAIN AFFECTING PREGNANCY IN SECOND TRIMESTER: ICD-10-CM

## 2019-05-16 DIAGNOSIS — O99.891 BACK PAIN AFFECTING PREGNANCY IN SECOND TRIMESTER: ICD-10-CM

## 2019-05-16 PROCEDURE — 99213 OFFICE O/P EST LOW 20 MIN: CPT | Performed by: ADVANCED PRACTICE MIDWIFE

## 2019-05-17 ENCOUNTER — HOSPITAL ENCOUNTER (OUTPATIENT)
Facility: HOSPITAL | Age: 24
Discharge: HOME | End: 2019-05-17
Attending: OBSTETRICS & GYNECOLOGY | Admitting: OBSTETRICS & GYNECOLOGY

## 2019-05-17 VITALS
BODY MASS INDEX: 25.07 KG/M2 | DIASTOLIC BLOOD PRESSURE: 64 MMHG | TEMPERATURE: 98.2 F | HEIGHT: 66 IN | HEART RATE: 88 BPM | SYSTOLIC BLOOD PRESSURE: 116 MMHG | OXYGEN SATURATION: 99 % | WEIGHT: 156 LBS | RESPIRATION RATE: 20 BRPM

## 2019-05-17 LAB
BILIRUB UR QL STRIP: NEGATIVE
CANDIDA ALBICANS: POSITIVE
CLARITY UR: CLEAR
COLOR UR: YELLOW
GARDNERELLA VAGINALIS: NEGATIVE
GLUCOSE UR STRIP-MCNC: NEGATIVE MG/DL
HGB UR QL STRIP.AUTO: NEGATIVE
KETONES UR QL STRIP: NEGATIVE
LEUKOCYTE ESTERASE UR QL STRIP.AUTO: NEGATIVE
NITRITE UR QL STRIP: NEGATIVE
PH UR STRIP.AUTO: 6.5 [PH] (ref 5–9)
PROT UR QL STRIP: NEGATIVE
SP GR UR STRIP: 1 (ref 1–1.03)
TRICHOMONAS VAGINALIS PCR: NEGATIVE
UROBILINOGEN UR QL STRIP: ABNORMAL

## 2019-05-17 PROCEDURE — 87480 CANDIDA DNA DIR PROBE: CPT | Performed by: OBSTETRICS & GYNECOLOGY

## 2019-05-17 PROCEDURE — G0463 HOSPITAL OUTPT CLINIC VISIT: HCPCS

## 2019-05-17 PROCEDURE — 87660 TRICHOMONAS VAGIN DIR PROBE: CPT | Performed by: OBSTETRICS & GYNECOLOGY

## 2019-05-17 PROCEDURE — 81003 URINALYSIS AUTO W/O SCOPE: CPT | Performed by: OBSTETRICS & GYNECOLOGY

## 2019-05-17 PROCEDURE — 87510 GARDNER VAG DNA DIR PROBE: CPT | Performed by: OBSTETRICS & GYNECOLOGY

## 2019-05-19 NOTE — PROGRESS NOTES
"CC: CLOTILDE visit, history reviewed, changes noted    ROS:Positive Patient states that she has a vasovagal disorder that has occurring more often. She becomes dizzy & weak when episodes occur, back pain from \"curvature\" of spine   Negative leaking fluid from the vagina, swelling in her legs, headache, visual changes and heartburn    Objective: Referral to cardiology f or vasovagal syndrome  Referral to PT for back pain    Educated on:1 hour GTT & CBC at next visit    A/Plan: f/u in 4 week/s   Kristal was seen today for routine prenatal visit.    Diagnoses and all orders for this visit:    23 weeks gestation of pregnancy  -     Ambulatory Referral to Physical Therapy Evaluate and treat (back pain ? scoliosis)  -     Glucose, Post 50 Gm Glucola; Future  -     CBC & Differential; Future    Vasovagal syncope  -     Ambulatory Referral to Cardiology    Back pain affecting pregnancy in second trimester  -     Ambulatory Referral to Physical Therapy Evaluate and treat (back pain ? scoliosis)        "

## 2019-05-20 RX ORDER — FLUCONAZOLE 150 MG/1
TABLET ORAL
Qty: 2 TABLET | Refills: 0 | Status: SHIPPED | OUTPATIENT
Start: 2019-05-20 | End: 2019-06-06

## 2019-06-06 ENCOUNTER — ROUTINE PRENATAL (OUTPATIENT)
Dept: OBSTETRICS AND GYNECOLOGY | Facility: CLINIC | Age: 24
End: 2019-06-06

## 2019-06-06 ENCOUNTER — LAB (OUTPATIENT)
Dept: LAB | Facility: HOSPITAL | Age: 24
End: 2019-06-06

## 2019-06-06 VITALS — BODY MASS INDEX: 25.82 KG/M2 | DIASTOLIC BLOOD PRESSURE: 67 MMHG | WEIGHT: 160 LBS | SYSTOLIC BLOOD PRESSURE: 102 MMHG

## 2019-06-06 DIAGNOSIS — M54.9 BACK PAIN AFFECTING PREGNANCY IN SECOND TRIMESTER: ICD-10-CM

## 2019-06-06 DIAGNOSIS — Z3A.26 26 WEEKS GESTATION OF PREGNANCY: Primary | ICD-10-CM

## 2019-06-06 DIAGNOSIS — E16.2 HYPOGLYCEMIA: Primary | ICD-10-CM

## 2019-06-06 DIAGNOSIS — O99.891 BACK PAIN AFFECTING PREGNANCY IN SECOND TRIMESTER: ICD-10-CM

## 2019-06-06 DIAGNOSIS — E16.2 HYPOGLYCEMIA: ICD-10-CM

## 2019-06-06 DIAGNOSIS — O99.332 TOBACCO USE AFFECTING PREGNANCY IN SECOND TRIMESTER, ANTEPARTUM: ICD-10-CM

## 2019-06-06 DIAGNOSIS — Z3A.26 26 WEEKS GESTATION OF PREGNANCY: ICD-10-CM

## 2019-06-06 DIAGNOSIS — Z3A.23 23 WEEKS GESTATION OF PREGNANCY: ICD-10-CM

## 2019-06-06 DIAGNOSIS — B00.1 FEVER BLISTER: ICD-10-CM

## 2019-06-06 LAB
BASOPHILS # BLD AUTO: 0.02 10*3/MM3 (ref 0–0.2)
BASOPHILS NFR BLD AUTO: 0.2 % (ref 0–1.5)
DEPRECATED RDW RBC AUTO: 44.4 FL (ref 37–54)
EOSINOPHIL # BLD AUTO: 0.42 10*3/MM3 (ref 0–0.4)
EOSINOPHIL NFR BLD AUTO: 3.4 % (ref 0.3–6.2)
ERYTHROCYTE [DISTWIDTH] IN BLOOD BY AUTOMATED COUNT: 13.6 % (ref 12.3–15.4)
GLUCOSE 1H P 100 G GLC PO SERPL-MCNC: 98 MG/DL
HCT VFR BLD AUTO: 33.2 % (ref 34–46.6)
HGB BLD-MCNC: 11 G/DL (ref 12–15.9)
IMM GRANULOCYTES # BLD AUTO: 0.09 10*3/MM3 (ref 0–0.05)
IMM GRANULOCYTES NFR BLD AUTO: 0.7 % (ref 0–0.5)
LYMPHOCYTES # BLD AUTO: 3.08 10*3/MM3 (ref 0.7–3.1)
LYMPHOCYTES NFR BLD AUTO: 24.9 % (ref 19.6–45.3)
MCH RBC QN AUTO: 29.8 PG (ref 26.6–33)
MCHC RBC AUTO-ENTMCNC: 33.1 G/DL (ref 31.5–35.7)
MCV RBC AUTO: 90 FL (ref 79–97)
MONOCYTES # BLD AUTO: 0.63 10*3/MM3 (ref 0.1–0.9)
MONOCYTES NFR BLD AUTO: 5.1 % (ref 5–12)
NEUTROPHILS # BLD AUTO: 8.11 10*3/MM3 (ref 1.7–7)
NEUTROPHILS NFR BLD AUTO: 65.7 % (ref 42.7–76)
NRBC BLD AUTO-RTO: 0 /100 WBC (ref 0–0.2)
PLATELET # BLD AUTO: 212 10*3/MM3 (ref 140–450)
PMV BLD AUTO: 12.3 FL (ref 6–12)
RBC # BLD AUTO: 3.69 10*6/MM3 (ref 3.77–5.28)
WBC NRBC COR # BLD: 12.35 10*3/MM3 (ref 3.4–10.8)

## 2019-06-06 PROCEDURE — 82950 GLUCOSE TEST: CPT

## 2019-06-06 PROCEDURE — 36415 COLL VENOUS BLD VENIPUNCTURE: CPT

## 2019-06-06 PROCEDURE — 85025 COMPLETE CBC W/AUTO DIFF WBC: CPT

## 2019-06-06 PROCEDURE — 99213 OFFICE O/P EST LOW 20 MIN: CPT | Performed by: ADVANCED PRACTICE MIDWIFE

## 2019-06-06 RX ORDER — VALACYCLOVIR HYDROCHLORIDE 500 MG/1
500 TABLET, FILM COATED ORAL DAILY
Qty: 30 TABLET | Refills: 4 | Status: SHIPPED | OUTPATIENT
Start: 2019-06-06 | End: 2019-07-06

## 2019-06-06 NOTE — PROGRESS NOTES
CC: CLOTILDE visit    Patient Active Problem List   Diagnosis   • Syncopal episodes   • Subchorionic hemorrhage in first trimester       ROS: fever blisters in nose, bit are almost gone    P/E:  See Vitals flowsheet    A/P: 24 y.o. #: 1, Date: 2017, Sex: None, Weight: None, GA: None, Delivery: None, Apgar1: None, Apgar5: None, Living: None, Birth Comments: None    #: 2, Date: None, Sex: None, Weight: None, GA: None, Delivery: None, Apgar1: None, Apgar5: None, Living: None, Birth Comments: None      1. Routine prenatal care   Encourage PNV   PTL precautions   Labs- 1 hour GTT 7 CBC today   Genetic screening   RTC 2 weeks  2.    Diagnosis Plan   1. 26 weeks gestation of pregnancy     2. Back pain affecting pregnancy in second trimester  PT next week   3. Tobacco use affecting pregnancy in second trimester, antepartum     4. Hypoglycemia  Ambulatory Referral to Nutrition Services   5. Fever blister     Has not had any issues with the vasovagal syndrome recently, but has appt with cardiology at the end of July

## 2019-06-13 ENCOUNTER — HOSPITAL ENCOUNTER (OUTPATIENT)
Dept: PHYSICAL THERAPY | Facility: HOSPITAL | Age: 24
Setting detail: THERAPIES SERIES
Discharge: HOME | End: 2019-06-13

## 2019-06-13 ENCOUNTER — ROUTINE PRENATAL (OUTPATIENT)
Dept: OBSTETRICS AND GYNECOLOGY | Facility: CLINIC | Age: 24
End: 2019-06-13

## 2019-06-13 ENCOUNTER — APPOINTMENT (OUTPATIENT)
Dept: LAB | Facility: HOSPITAL | Age: 24
End: 2019-06-13

## 2019-06-13 VITALS — WEIGHT: 162 LBS | DIASTOLIC BLOOD PRESSURE: 64 MMHG | SYSTOLIC BLOOD PRESSURE: 98 MMHG | BODY MASS INDEX: 26.15 KG/M2

## 2019-06-13 DIAGNOSIS — O99.891 BACK PAIN AFFECTING PREGNANCY IN SECOND TRIMESTER: ICD-10-CM

## 2019-06-13 DIAGNOSIS — O99.891 BACK PAIN AFFECTING PREGNANCY IN THIRD TRIMESTER: Primary | ICD-10-CM

## 2019-06-13 DIAGNOSIS — N94.89 UTERINE CRAMPING: Primary | ICD-10-CM

## 2019-06-13 DIAGNOSIS — R55 VASOVAGAL SYNCOPE: ICD-10-CM

## 2019-06-13 DIAGNOSIS — M54.9 BACK PAIN AFFECTING PREGNANCY IN THIRD TRIMESTER: Primary | ICD-10-CM

## 2019-06-13 DIAGNOSIS — Z3A.27 27 WEEKS GESTATION OF PREGNANCY: ICD-10-CM

## 2019-06-13 DIAGNOSIS — M54.9 BACK PAIN AFFECTING PREGNANCY IN SECOND TRIMESTER: ICD-10-CM

## 2019-06-13 LAB
BILIRUB UR QL STRIP: NEGATIVE
CANDIDA ALBICANS: POSITIVE
CLARITY UR: CLEAR
COLOR UR: YELLOW
GARDNERELLA VAGINALIS: NEGATIVE
GLUCOSE UR STRIP-MCNC: NEGATIVE MG/DL
HGB UR QL STRIP.AUTO: NEGATIVE
KETONES UR QL STRIP: NEGATIVE
LEUKOCYTE ESTERASE UR QL STRIP.AUTO: NEGATIVE
NITRITE UR QL STRIP: NEGATIVE
PH UR STRIP.AUTO: 7 [PH] (ref 5–8)
PROT UR QL STRIP: NEGATIVE
SP GR UR STRIP: 1.01 (ref 1–1.03)
T VAGINALIS DNA VAG QL PROBE+SIG AMP: NEGATIVE
UROBILINOGEN UR QL STRIP: NORMAL

## 2019-06-13 PROCEDURE — 87660 TRICHOMONAS VAGIN DIR PROBE: CPT | Performed by: ADVANCED PRACTICE MIDWIFE

## 2019-06-13 PROCEDURE — 81003 URINALYSIS AUTO W/O SCOPE: CPT | Performed by: ADVANCED PRACTICE MIDWIFE

## 2019-06-13 PROCEDURE — 99213 OFFICE O/P EST LOW 20 MIN: CPT | Performed by: ADVANCED PRACTICE MIDWIFE

## 2019-06-13 PROCEDURE — 97162 PT EVAL MOD COMPLEX 30 MIN: CPT | Performed by: PHYSICAL THERAPIST

## 2019-06-13 PROCEDURE — 87510 GARDNER VAG DNA DIR PROBE: CPT | Performed by: ADVANCED PRACTICE MIDWIFE

## 2019-06-13 PROCEDURE — 87480 CANDIDA DNA DIR PROBE: CPT | Performed by: ADVANCED PRACTICE MIDWIFE

## 2019-06-13 RX ORDER — FLUCONAZOLE 150 MG/1
TABLET ORAL
Qty: 2 TABLET | Refills: 0 | Status: SHIPPED | OUTPATIENT
Start: 2019-06-13 | End: 2019-07-11

## 2019-06-21 NOTE — PROGRESS NOTES
Chief complaint crampy abdominal pain    Patient presented with crampy abdominal pain denied rupture of membranes vaginal bleeding    Diagnosis  contractions third trimester

## 2019-06-23 NOTE — PROGRESS NOTES
CC: CLOTILDE visit    Patient Active Problem List   Diagnosis   • Syncopal episodes   • Subchorionic hemorrhage in first trimester       HPI: Here for follow up prenatal care.     Labs:   No results found for: HGBA1C  Glucose   Date Value Ref Range Status   03/07/2019 81 60 - 100 mg/dL Final   03/04/2019 83 60 - 100 mg/dL Final   01/09/2019 83 60 - 100 mg/dL Final     Last Completed Pap Smear       Status Date      PAP SMEAR No completions recorded          Radiology:    Each of the above were reviewed and integrated into prenatal care plan.    P/E:  See Vitals flowsheet    A/P: 24 y.o. #: 1, Date: 2017, Sex: None, Weight: None, GA: None, Delivery: None, Apgar1: None, Apgar5: None, Living: None, Birth Comments: None    #: 2, Date: None, Sex: None, Weight: None, GA: None, Delivery: None, Apgar1: None, Apgar5: None, Living: None, Birth Comments: None      1. Routine prenatal care   Encourage PNV   PTL precautions     2.    Diagnosis Plan   1. Uterine cramping  Urinalysis With Culture If Indicated - Urine, Clean Catch Magnesium Sulfate OTC    Gardnerella vaginalis, Trichomonas vaginalis, Candida albicans, DNA - Swab, Vagina   2. 27 weeks gestation of pregnancy  Urinalysis With Culture If Indicated - Urine, Clean Catch    Gardnerella vaginalis, Trichomonas vaginalis, Candida albicans, DNA - Swab, Vagina   3. Back pain affecting pregnancy in third trimester     4. Vasovagal syncope  Sees cardiology in July. Has not been having any issues

## 2019-06-29 ENCOUNTER — HOSPITAL ENCOUNTER (OUTPATIENT)
Facility: HOSPITAL | Age: 24
Discharge: HOME | End: 2019-06-29
Attending: OBSTETRICS & GYNECOLOGY | Admitting: OBSTETRICS & GYNECOLOGY

## 2019-06-29 VITALS
BODY MASS INDEX: 26.84 KG/M2 | SYSTOLIC BLOOD PRESSURE: 124 MMHG | HEART RATE: 98 BPM | WEIGHT: 167 LBS | HEIGHT: 66 IN | RESPIRATION RATE: 20 BRPM | DIASTOLIC BLOOD PRESSURE: 58 MMHG | TEMPERATURE: 97.4 F | OXYGEN SATURATION: 99 %

## 2019-06-29 LAB
ALBUMIN SERPL-MCNC: 3.4 G/DL (ref 3.5–5.2)
ALBUMIN/GLOB SERPL: 1 G/DL
ALP SERPL-CCNC: 123 U/L (ref 39–117)
ALT SERPL W P-5'-P-CCNC: 5 U/L (ref 1–33)
ANION GAP SERPL CALCULATED.3IONS-SCNC: 12 MMOL/L (ref 5–15)
AST SERPL-CCNC: 16 U/L (ref 1–32)
BASOPHILS # BLD AUTO: 0.03 10*3/MM3 (ref 0–0.2)
BASOPHILS NFR BLD AUTO: 0.2 % (ref 0–1.5)
BILIRUB SERPL-MCNC: 0.2 MG/DL (ref 0.2–1.2)
BILIRUB UR QL STRIP: NEGATIVE
BUN BLD-MCNC: 8 MG/DL (ref 6–20)
BUN/CREAT SERPL: 15.7 (ref 7–25)
CALCIUM SPEC-SCNC: 8.8 MG/DL (ref 8.6–10.5)
CHLORIDE SERPL-SCNC: 105 MMOL/L (ref 98–107)
CLARITY UR: CLEAR
CO2 SERPL-SCNC: 21 MMOL/L (ref 22–29)
COLOR UR: YELLOW
CREAT BLD-MCNC: 0.51 MG/DL (ref 0.57–1)
DEPRECATED RDW RBC AUTO: 40.5 FL (ref 37–54)
EOSINOPHIL # BLD AUTO: 0.49 10*3/MM3 (ref 0–0.4)
EOSINOPHIL NFR BLD AUTO: 2.7 % (ref 0.3–6.2)
ERYTHROCYTE [DISTWIDTH] IN BLOOD BY AUTOMATED COUNT: 13.2 % (ref 12.3–15.4)
GFR SERPL CREATININE-BSD FRML MDRD: >150 ML/MIN/1.73
GLOBULIN UR ELPH-MCNC: 3.3 GM/DL
GLUCOSE BLD-MCNC: 102 MG/DL (ref 65–99)
GLUCOSE BLDC GLUCOMTR-MCNC: 101 MG/DL (ref 70–130)
GLUCOSE UR STRIP-MCNC: NEGATIVE MG/DL
HCT VFR BLD AUTO: 29.6 % (ref 34–46.6)
HGB BLD-MCNC: 10 G/DL (ref 12–15.9)
HGB UR QL STRIP.AUTO: NEGATIVE
HOLD SPECIMEN: NORMAL
IMM GRANULOCYTES # BLD AUTO: 0.1 10*3/MM3 (ref 0–0.05)
IMM GRANULOCYTES NFR BLD AUTO: 0.6 % (ref 0–0.5)
KETONES UR QL STRIP: NEGATIVE
LEUKOCYTE ESTERASE UR QL STRIP.AUTO: NEGATIVE
LYMPHOCYTES # BLD AUTO: 3.91 10*3/MM3 (ref 0.7–3.1)
LYMPHOCYTES NFR BLD AUTO: 21.8 % (ref 19.6–45.3)
MCH RBC QN AUTO: 28.8 PG (ref 26.6–33)
MCHC RBC AUTO-ENTMCNC: 33.8 G/DL (ref 31.5–35.7)
MCV RBC AUTO: 85.3 FL (ref 79–97)
MONOCYTES # BLD AUTO: 1.19 10*3/MM3 (ref 0.1–0.9)
MONOCYTES NFR BLD AUTO: 6.6 % (ref 5–12)
NEUTROPHILS # BLD AUTO: 12.22 10*3/MM3 (ref 1.7–7)
NEUTROPHILS NFR BLD AUTO: 68.1 % (ref 42.7–76)
NITRITE UR QL STRIP: NEGATIVE
NRBC BLD AUTO-RTO: 0 /100 WBC (ref 0–0.2)
PH UR STRIP.AUTO: 7 [PH] (ref 5–9)
PLATELET # BLD AUTO: 248 10*3/MM3 (ref 140–450)
PMV BLD AUTO: 10.6 FL (ref 6–12)
POTASSIUM BLD-SCNC: 3.5 MMOL/L (ref 3.5–5.2)
PROT SERPL-MCNC: 6.7 G/DL (ref 6–8.5)
PROT UR QL STRIP: NEGATIVE
RBC # BLD AUTO: 3.47 10*6/MM3 (ref 3.77–5.28)
SODIUM BLD-SCNC: 138 MMOL/L (ref 136–145)
SP GR UR STRIP: 1.01 (ref 1–1.03)
UROBILINOGEN UR QL STRIP: NORMAL
WBC NRBC COR # BLD: 17.94 10*3/MM3 (ref 3.4–10.8)

## 2019-06-29 PROCEDURE — 36415 COLL VENOUS BLD VENIPUNCTURE: CPT | Performed by: OBSTETRICS & GYNECOLOGY

## 2019-06-29 PROCEDURE — 82962 GLUCOSE BLOOD TEST: CPT

## 2019-06-29 PROCEDURE — G0463 HOSPITAL OUTPT CLINIC VISIT: HCPCS

## 2019-06-29 PROCEDURE — 85025 COMPLETE CBC W/AUTO DIFF WBC: CPT | Performed by: OBSTETRICS & GYNECOLOGY

## 2019-06-29 PROCEDURE — 80053 COMPREHEN METABOLIC PANEL: CPT | Performed by: OBSTETRICS & GYNECOLOGY

## 2019-06-29 PROCEDURE — 59025 FETAL NON-STRESS TEST: CPT

## 2019-06-29 PROCEDURE — 59025 FETAL NON-STRESS TEST: CPT | Performed by: OBSTETRICS & GYNECOLOGY

## 2019-06-29 PROCEDURE — 81003 URINALYSIS AUTO W/O SCOPE: CPT | Performed by: OBSTETRICS & GYNECOLOGY

## 2019-07-11 ENCOUNTER — ROUTINE PRENATAL (OUTPATIENT)
Dept: OBSTETRICS AND GYNECOLOGY | Facility: CLINIC | Age: 24
End: 2019-07-11

## 2019-07-11 ENCOUNTER — HOSPITAL ENCOUNTER (OUTPATIENT)
Dept: PHYSICAL THERAPY | Facility: HOSPITAL | Age: 24
Discharge: HOME | End: 2019-07-11
Admitting: PHYSICAL THERAPIST

## 2019-07-11 VITALS — BODY MASS INDEX: 28.08 KG/M2 | SYSTOLIC BLOOD PRESSURE: 118 MMHG | WEIGHT: 174 LBS | DIASTOLIC BLOOD PRESSURE: 66 MMHG

## 2019-07-11 DIAGNOSIS — G56.00 CARPAL TUNNEL SYNDROME DURING PREGNANCY: ICD-10-CM

## 2019-07-11 DIAGNOSIS — O99.891 BACK PAIN AFFECTING PREGNANCY IN SECOND TRIMESTER: ICD-10-CM

## 2019-07-11 DIAGNOSIS — O26.899 CARPAL TUNNEL SYNDROME DURING PREGNANCY: ICD-10-CM

## 2019-07-11 DIAGNOSIS — R55 SYNCOPE, UNSPECIFIED SYNCOPE TYPE: Primary | ICD-10-CM

## 2019-07-11 DIAGNOSIS — M54.9 BACK PAIN AFFECTING PREGNANCY IN SECOND TRIMESTER: ICD-10-CM

## 2019-07-11 DIAGNOSIS — O99.891 BACK PAIN AFFECTING PREGNANCY IN THIRD TRIMESTER: Primary | ICD-10-CM

## 2019-07-11 DIAGNOSIS — Z3A.31 31 WEEKS GESTATION OF PREGNANCY: Primary | ICD-10-CM

## 2019-07-11 DIAGNOSIS — M54.9 BACK PAIN AFFECTING PREGNANCY IN THIRD TRIMESTER: Primary | ICD-10-CM

## 2019-07-11 DIAGNOSIS — R55 VASOVAGAL SYNCOPE: ICD-10-CM

## 2019-07-11 PROCEDURE — 97110 THERAPEUTIC EXERCISES: CPT | Performed by: PHYSICAL THERAPIST

## 2019-07-11 PROCEDURE — 90715 TDAP VACCINE 7 YRS/> IM: CPT | Performed by: ADVANCED PRACTICE MIDWIFE

## 2019-07-11 PROCEDURE — 99213 OFFICE O/P EST LOW 20 MIN: CPT | Performed by: ADVANCED PRACTICE MIDWIFE

## 2019-07-11 PROCEDURE — 90471 IMMUNIZATION ADMIN: CPT | Performed by: ADVANCED PRACTICE MIDWIFE

## 2019-07-11 RX ORDER — HYDROXYZINE PAMOATE 25 MG/1
25 CAPSULE ORAL 3 TIMES DAILY PRN
Qty: 90 CAPSULE | Refills: 0 | Status: SHIPPED | OUTPATIENT
Start: 2019-07-11 | End: 2019-08-08

## 2019-07-11 NOTE — PROGRESS NOTES
CC: CLOTILDE visit    Patient Active Problem List   Diagnosis   • Syncopal episodes   • Subchorionic hemorrhage in first trimester       HPI: Here for follow up prenatal care.     Labs:   No results found for: HGBA1C  Glucose   Date Value Ref Range Status   06/29/2019 102 (H) 65 - 99 mg/dL Final   03/07/2019 81 60 - 100 mg/dL Final   03/04/2019 83 60 - 100 mg/dL Final     Last Completed Pap Smear       Status Date      PAP SMEAR No completions recorded          Radiology:    Each of the above were reviewed and integrated into prenatal care plan.    P/E:  See Vitals flowsheet    A/P: 24 y.o. #: 1, Date: 2017, Sex: None, Weight: None, GA: None, Delivery: None, Apgar1: None, Apgar5: None, Living: None, Birth Comments: None    #: 2, Date: None, Sex: None, Weight: None, GA: None, Delivery: None, Apgar1: None, Apgar5: None, Living: None, Birth Comments: None      1. Routine prenatal care   Encourage PNV   PTL precautions     2.    Diagnosis Plan   1. 31 weeks gestation of pregnancy  CBC & Differential   2. Back pain affecting pregnancy in third trimester     3. Vasovagal syncope  No issues since June 26, 2019   4. Carpal tunnel syndrome during pregnancy  See PT note for other complaints & evaluation. Patient declined to have MRI when in hospital June 26th

## 2019-07-11 NOTE — THERAPY RE-EVALUATION
Outpatient Physical Therapy Pelvic Health Re-Assessment  [facility]     Patient Name: Kristal Moreno  : 1995  MRN: [id number]  Today's Date: 2019        Visit Date: 2019  Visit Number:   Recheck: 8/10/19  Insurance: 8 visits (19), Needs extension  Improvement: 0%    Patient Active Problem List   Diagnosis   • Syncopal episodes   • Subchorionic hemorrhage in first trimester        Past Medical History:   Diagnosis Date   • Allergic rhinitis    • Back pain affecting pregnancy in third trimester    • Dysuria    • Encounter for contraceptive management    • Fever    • Fibrocystic disease of breast    • Headache    • Hypotension    • Irregular periods    • Nausea and vomiting    • Neurocardiogenic syncope    • Neurocardiogenic syncope    • Scar    • Unsocialized conduct disorder     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 Back pain affecting pregnancy in third trimester O99.89 646.83    M54.9 724.5             PT Ortho     Row Name 19 1000       Subjective Comments    Subjective Comments  Patient reports that she was on vacation and couldn't come into therapy prior to this visit.  Nothing really has changed.  Having c/o numbness in her sleep.  If lays on side for longer than 15 minutes, body goes completely numb.  Not just specific to legs, but also to arms and everywhere.  BP has been good.  No other reason given for  feeling.  Riding to [employer], could not close her hands due to swelling. Also noted legs are super swollen.  Notes that swelling goes down in sleep.  Mainly pointer and middle fingers are numb.    -SW       Subjective Pain    Able to rate subjective pain?  yes  -    Pre-Treatment Pain Level  3  -SW    Post-Treatment Pain Level  3  -SW       Posture/Observations    Posture- WNL  Posture is WNL  -SW    Posture/Observations Comments  Labored gait this am.  Slow and sluggish with appearance. Bilateral LE and UE with good capillary refill; Neg pitting edema; size and girth appear symmetrical bilaterally.   -SW       Quarter Clearing    Quarter Clearing  Upper Quarter Clearing;Lower Quarter Clearing  -SW       DTR- Upper Quarter Clearing    Biceps (C5/6)  2- Normal response  -SW    Brachioradialis (C6)  2- Normal response  -SW    Triceps (C7)  2- Normal response  -SW       Sensory Screen for Light Touch- Upper Quarter Clearing    C4 (posterior shoulder)  Intact  -SW    C5 (lateral upper arm)  Intact  -SW    C6 (tip of thumb)  Intact  -SW    C7 (tip of 3rd finger)  Intact  -SW    C8 (tip of 5th finger)  Intact  -SW    T1 (medial lower arm)  Intact  -SW       Myotomal Screen- Upper Quarter Clearing    Shoulder flexion (C5)  5 (Normal)  -SW    Elbow flexion/wrist extension (C6)  5 (Normal)  -SW    Elbow extension/wrist flexion (C7)  5 (Normal)  -SW    Finger flexion/ (C8)  5 (Normal)  -SW    Finger abduction (T1)  5 (Normal)  -SW      WNL  -SW       Cervical/Shoulder ROM Screen    Cervical flexion  Normal  -SW    Cervical extension  Normal  -SW    Cervical lateral flexion  Normal  -SW    Cervical rotation  Normal  -SW    Cervical quadrant (Spurling's)  Normal  -SW    Shoulder elevation   Normal  -SW       DTR- Lower Quarter Clearing    Patellar tendon (L2-4)  2- Normal response  -SW    Achilles tendon (S1-2)  2- Normal response  -SW       Neural Tension Signs- Lower Quarter Clearing    Slump  Bilateral:;Positive  radiate to knee  -SW    SLR  Bilateral:;Positive  -SW       Pathological Reflexes- Lower Quarter Clearing    Garcia  Negative  -SW       Sensory Screen for Light Touch- Lower Quarter Clearing    L1 (inguinal area)  Intact  -SW    L2 (anterior mid thigh)  Intact  -SW    L3 (distal anterior thigh)  Intact  -SW    L4 (medial lower leg/foot)  Intact  -SW    L5 (lateral lower leg/great toe)  Intact  -SW    S1 (bottom of foot)  Intact  -SW       Myotomal Screen- Lower Quarter Clearing    Hip flexion (L2)  WNL;5 (Normal)  -SW    Knee extension (L3)  WNL;5 (Normal)  -SW    Knee flexion (S2)  WNL;5 (Normal)  -SW       Lumbar ROM Screen- Lower Quarter Clearing    Lumbar Flexion  Normal  -SW    Lumbar Extension  Normal  -SW    Lumbar Lateral Flexion  Normal  -SW    Lumbar Rotation  Normal  -SW       SI/Hip Screen- Lower Quarter Clearing    ASIS compression  Positive  -SW    ASIS distraction  Positive  -SW    Danis's/Bruce's test  Negative  -SW    Posterior thigh sheer  Positive  -SW       Special Tests/Palpation    Special Tests/Palpation  Lumbar/SI  -SW       Lumbosacral Accessory Motions    Lumbosacral Accessory Motions Tested?  Yes  -SW    PA glide- Sacral base  -- alignment with good spring assessment  -SW    Innominate rotation  -- symmetrical in supine position  -SW       Lumbar/SI Special Tests    SLR (Neural Tension)  Positive  -SW    DANIS (hip vs. SI Dysfunction)  Negative  -SW    Lumbar/SI Special Tests Comments  Femoral nerve tension also noted in prone position  -SW       Lumbosacral Palpation    Lumbosacral Palpation?  Yes  -SW    Lumbosacral Segment  Bilateral:;Tender;Guarded/taut  -SW    Lumbosacral Palpation Comments  Good sacral alignment this visit. Good spring to sacrum and LS. AP to thoracic also normal without restrictions  identified.   -SW       General ROM    GENERAL ROM COMMENTS  All functional UE, LE and LS and CS  -SW       Girth    Girth Measured?  -- equal bilaterally  -SW       Transfers     Comment (Transfers)  independent.   -       Gait/Stairs Assessment/Training    Comment (Gait/Stairs)  slow and cautious this date, but able without assist required.   -      User Key  (r) = Recorded By, (t) = Taken By, (c) = Cosigned By    Initials Name Provider Type    Cristiane Snyder, PT Physical Therapist                     PT Assessment/Plan     Row Name 07/11/19 1000          PT Assessment    Functional Limitations  Performance in leisure activities;Performance in self-care ADL;Performance in work activities;Limitation in home management;Limitations in community activities  -     Impairments  Impaired muscle length;Impaired postural alignment;Pain;Posture;Poor body mechanics  -     Assessment Comments  Patient reports symptoms of numbness in BLE and BUE usually caused for supine or sidelying positions. Complaints are always eliminated with change of position. No clincial sign of cord compression. No history of bowel or bladder changes. Edema min this date and symmetrical bilaterally.  No tenderness with touch. Capillary refill normal. No indication of blood clot identified. Clincally patient presents with UE s/s as early stages of CT bilaterally.  Or the potentially of TOS due to s/s starting at shoulders bilaterally.  LE possible for + neural tension as well. Nerve glides issued this date for assist with neural tension.  LE tension guided with seated nerve glides to assist with tension. Responded well to above.  Will continue to advance with other stretches and neural tension components as necessary.  Patient may respond to splint for positioning as needed for nerve protection. Will address depending on how well she progresses. Patient advised that travel can exaccerbate s/s as can improper support during activities. Posture and shoe wear were also discussed. Patient received education re: clinical concerns such as numbness that does not go away, swelling seen only unilateral, extremity that is  warm to touch etc.  Advised with inc water hydration and avoidance of salt including sodas, preservatives (frozen and fast foods).    -SW     Rehab Potential  Good  -SW     Patient/caregiver participated in establishment of treatment plan and goals  Yes  -SW     Patient would benefit from skilled therapy intervention  Yes  -SW        PT Plan    PT Frequency  1x/week  -     Predicted Duration of Therapy Intervention (Therapy Eval)  8 visits  -     PT Plan Comments  Insurance approved 8 visits expiring 7/17.  Needs extension of dates. Add prone femoral nerve glides next. Radial nerve and ulnar nerve glides as s/s present.   -       User Key  (r) = Recorded By, (t) = Taken By, (c) = Cosigned By    Initials Name Provider Type    Cristiane Snyder, PT Physical Therapist            Exercises     Row Name 07/11/19 1000             Subjective Comments    Subjective Comments  Patient reports that she was on vacation and couldn't come into therapy prior to this visit.  Nothing really has changed.  Having c/o numbness in her sleep.  If lays on side for longer than 15 minutes, body goes completely numb.  Not just specific to legs, but also to arms and everywhere.  BP has been good.  No other reason given for feeling.  Riding to [place], could not close her hands due to swelling. Also noted legs are super swollen.  Notes that swelling goes down in sleep.  Mainly pointer and middle fingers are numb.    -SW         Subjective Pain    Able to rate subjective pain?  yes  -SW      Pre-Treatment Pain Level  3  -SW      Post-Treatment Pain Level  3  -SW         Exercise 1    Exercise Name 1  hamstring stretch   -SW      Reps 1  3  -SW      Time 1  30 sec  -SW      Additional Comments  pain reported to back of knee.  -SW         Exercise 2    Exercise Name 2  calf stretch with towel.  -SW      Reps 2  3  -SW      Time 2  30 sec  -SW         Exercise 3    Exercise Name 3  Wrist flexor stretch   -SW      Reps 3  3  -SW       Time 3  30 sec  -SW         Exercise 4    Exercise Name 4  prayer stretch   -SW      Reps 4  3  -SW      Time 4  30 sec  -SW         Exercise 5    Exercise Name 5  swim glides  -SW      Reps 5  10  -SW         Exercise 6    Exercise Name 6  sciatic nerve glides  -SW      Reps 6  10  -SW      Additional Comments  easier on L side than R.   -SW         Exercise 7    Exercise Name 7  piriformis stretch   -SW      Reps 7  3  -SW      Time 7  30 sec  -SW         Exercise 8    Exercise Name 8  angry cat stretch   -SW      Reps 8  10  -SW      Time 8  5 sec  -SW        User Key  (r) = Recorded By, (t) = Taken By, (c) = Cosigned By    Initials Name Provider Type    SW Cristiane Wynn, PT Physical Therapist                      PT OP Goals     Row Name 07/11/19 1000          PT Short Term Goals    STG Date to Achieve  08/08/19  -     STG 1  patient to be independent with HEP  -SW     STG 1 Progress  Progressing  -     STG 2  patient to have improved neural tension such that leg extension in seated position doesn't elicit pain  -     STG 2 Progress  Progressing  -     STG 3  patient to show proper floor to waist t/f to show pelvic neutral position and lifting with legs vs back,  -     STG 3 Progress  New  -     STG 4  Patient to report pain in LB to be intermittent vs constant as reported in evaluation with use of HEP and postural control exercises.   -     STG 4 Progress  New  -     STG 5  Patient to demo increased postural awareness with seated position to show dec sacral sit and more upright positioning.  -     STG 5 Progress  New  -        Long Term Goals    LTG Date to Achieve  09/27/19  -     LTG 1  Subjectively improve 70% better overall  -     LTG 1 Progress  New  -     LTG 2  No missed work days due to pain  -     LTG 2 Progress  New  -     LTG 3  Patient to show Lumbar ROM in all planes without inc pain for performance.   -     LTG 3 Progress  New  -     LTG 4  Mod Oswestry  score of 15% or less indicating improved function with ADL  -SW     LTG 4 Progress  New  -        Time Calculation    PT Goal Re-Cert Due Date  08/08/19  -       User Key  (r) = Recorded By, (t) = Taken By, (c) = Cosigned By    Initials Name Provider Type    Cristiane Snyder, PT Physical Therapist          Therapy Education  Given: HEP  Program: Reinforced, Progressed  How Provided: Verbal, Demonstration  Provided to: Patient  Level of Understanding: Teach back education performed, Verbalized, Demonstrated               Time Calculation:   Start Time: 1016  Stop Time: 1111  Time Calculation (min): 55 min  Therapy Charges for Today     Code Description Service Date Service Provider Modifiers Qty    05608807977  PT THER PROC EA 15 MIN 7/11/2019 Cristiane Wynn, PT GP 4    [id number]  PT THER SUPP EA 15 MIN 7/11/2019 Cristiane Wynn, PT GP 1                  Cristiane Wynn, PT  7/11/2019

## 2019-07-18 ENCOUNTER — APPOINTMENT (OUTPATIENT)
Dept: PHYSICAL THERAPY | Facility: HOSPITAL | Age: 24
End: 2019-07-18

## 2019-07-18 ENCOUNTER — APPOINTMENT (OUTPATIENT)
Dept: ULTRASOUND IMAGING | Facility: HOSPITAL | Age: 24
End: 2019-07-18

## 2019-07-18 ENCOUNTER — HOSPITAL ENCOUNTER (OUTPATIENT)
Facility: HOSPITAL | Age: 24
Setting detail: OBSERVATION
Discharge: HOME | End: 2019-07-19
Attending: OBSTETRICS & GYNECOLOGY | Admitting: OBSTETRICS & GYNECOLOGY

## 2019-07-18 DIAGNOSIS — O16.3 HYPERTENSION AFFECTING PREGNANCY IN THIRD TRIMESTER: Primary | ICD-10-CM

## 2019-07-18 LAB
ALBUMIN SERPL-MCNC: 3.3 G/DL (ref 3.5–5.2)
ALBUMIN/GLOB SERPL: 0.9 G/DL
ALP SERPL-CCNC: 176 U/L (ref 39–117)
ALT SERPL W P-5'-P-CCNC: 5 U/L (ref 1–33)
ANION GAP SERPL CALCULATED.3IONS-SCNC: 14 MMOL/L (ref 5–15)
AST SERPL-CCNC: 12 U/L (ref 1–32)
BACTERIA UR QL AUTO: ABNORMAL /HPF
BASOPHILS # BLD AUTO: 0.04 10*3/MM3 (ref 0–0.2)
BASOPHILS NFR BLD AUTO: 0.2 % (ref 0–1.5)
BILIRUB SERPL-MCNC: 0.3 MG/DL (ref 0.2–1.2)
BILIRUB UR QL STRIP: NEGATIVE
BUN BLD-MCNC: 6 MG/DL (ref 6–20)
BUN/CREAT SERPL: 11.8 (ref 7–25)
CALCIUM SPEC-SCNC: 9 MG/DL (ref 8.6–10.5)
CANDIDA ALBICANS: NEGATIVE
CHLORIDE SERPL-SCNC: 102 MMOL/L (ref 98–107)
CLARITY UR: ABNORMAL
CO2 SERPL-SCNC: 20 MMOL/L (ref 22–29)
COLOR UR: YELLOW
CREAT BLD-MCNC: 0.51 MG/DL (ref 0.57–1)
DEPRECATED RDW RBC AUTO: 40.2 FL (ref 37–54)
EOSINOPHIL # BLD AUTO: 0.32 10*3/MM3 (ref 0–0.4)
EOSINOPHIL NFR BLD AUTO: 1.7 % (ref 0.3–6.2)
ERYTHROCYTE [DISTWIDTH] IN BLOOD BY AUTOMATED COUNT: 13.1 % (ref 12.3–15.4)
GARDNERELLA VAGINALIS: NEGATIVE
GFR SERPL CREATININE-BSD FRML MDRD: >150 ML/MIN/1.73
GLOBULIN UR ELPH-MCNC: 3.7 GM/DL
GLUCOSE BLD-MCNC: 80 MG/DL (ref 65–99)
GLUCOSE UR STRIP-MCNC: NEGATIVE MG/DL
HCT VFR BLD AUTO: 31.4 % (ref 34–46.6)
HGB BLD-MCNC: 10.8 G/DL (ref 12–15.9)
HGB UR QL STRIP.AUTO: ABNORMAL
HYALINE CASTS UR QL AUTO: ABNORMAL /LPF
IMM GRANULOCYTES # BLD AUTO: 0.15 10*3/MM3 (ref 0–0.05)
IMM GRANULOCYTES NFR BLD AUTO: 0.8 % (ref 0–0.5)
KETONES UR QL STRIP: ABNORMAL
LEUKOCYTE ESTERASE UR QL STRIP.AUTO: ABNORMAL
LYMPHOCYTES # BLD AUTO: 3.44 10*3/MM3 (ref 0.7–3.1)
LYMPHOCYTES NFR BLD AUTO: 18.4 % (ref 19.6–45.3)
MCH RBC QN AUTO: 29.1 PG (ref 26.6–33)
MCHC RBC AUTO-ENTMCNC: 34.4 G/DL (ref 31.5–35.7)
MCV RBC AUTO: 84.6 FL (ref 79–97)
MONOCYTES # BLD AUTO: 1.45 10*3/MM3 (ref 0.1–0.9)
MONOCYTES NFR BLD AUTO: 7.7 % (ref 5–12)
NEUTROPHILS # BLD AUTO: 13.31 10*3/MM3 (ref 1.7–7)
NEUTROPHILS NFR BLD AUTO: 71.2 % (ref 42.7–76)
NITRITE UR QL STRIP: NEGATIVE
NRBC BLD AUTO-RTO: 0 /100 WBC (ref 0–0.2)
PH UR STRIP.AUTO: 6.5 [PH] (ref 5–9)
PLATELET # BLD AUTO: 293 10*3/MM3 (ref 140–450)
PMV BLD AUTO: 11.2 FL (ref 6–12)
POTASSIUM BLD-SCNC: 3.5 MMOL/L (ref 3.5–5.2)
PROT SERPL-MCNC: 7 G/DL (ref 6–8.5)
PROT UR QL STRIP: NEGATIVE
RBC # BLD AUTO: 3.71 10*6/MM3 (ref 3.77–5.28)
RBC # UR: ABNORMAL /HPF
REF LAB TEST METHOD: ABNORMAL
SODIUM BLD-SCNC: 136 MMOL/L (ref 136–145)
SP GR UR STRIP: 1.01 (ref 1–1.03)
SQUAMOUS #/AREA URNS HPF: ABNORMAL /HPF
T VAGINALIS DNA VAG QL PROBE+SIG AMP: NEGATIVE
UROBILINOGEN UR QL STRIP: ABNORMAL
WBC NRBC COR # BLD: 18.71 10*3/MM3 (ref 3.4–10.8)
WBC UR QL AUTO: ABNORMAL /HPF

## 2019-07-18 PROCEDURE — 85025 COMPLETE CBC W/AUTO DIFF WBC: CPT | Performed by: OBSTETRICS & GYNECOLOGY

## 2019-07-18 PROCEDURE — 87660 TRICHOMONAS VAGIN DIR PROBE: CPT | Performed by: OBSTETRICS & GYNECOLOGY

## 2019-07-18 PROCEDURE — 87510 GARDNER VAG DNA DIR PROBE: CPT | Performed by: OBSTETRICS & GYNECOLOGY

## 2019-07-18 PROCEDURE — G0378 HOSPITAL OBSERVATION PER HR: HCPCS

## 2019-07-18 PROCEDURE — 80053 COMPREHEN METABOLIC PANEL: CPT | Performed by: OBSTETRICS & GYNECOLOGY

## 2019-07-18 PROCEDURE — 99220 PR INITIAL OBSERVATION CARE/DAY 70 MINUTES: CPT | Performed by: OBSTETRICS & GYNECOLOGY

## 2019-07-18 PROCEDURE — 76817 TRANSVAGINAL US OBSTETRIC: CPT

## 2019-07-18 PROCEDURE — 81001 URINALYSIS AUTO W/SCOPE: CPT | Performed by: OBSTETRICS & GYNECOLOGY

## 2019-07-18 PROCEDURE — 76816 OB US FOLLOW-UP PER FETUS: CPT

## 2019-07-18 PROCEDURE — 59025 FETAL NON-STRESS TEST: CPT

## 2019-07-18 PROCEDURE — 76820 UMBILICAL ARTERY ECHO: CPT

## 2019-07-18 PROCEDURE — 87480 CANDIDA DNA DIR PROBE: CPT | Performed by: OBSTETRICS & GYNECOLOGY

## 2019-07-18 PROCEDURE — 76819 FETAL BIOPHYS PROFIL W/O NST: CPT

## 2019-07-19 VITALS
DIASTOLIC BLOOD PRESSURE: 71 MMHG | HEART RATE: 92 BPM | TEMPERATURE: 97.8 F | OXYGEN SATURATION: 97 % | RESPIRATION RATE: 18 BRPM | SYSTOLIC BLOOD PRESSURE: 106 MMHG

## 2019-07-19 DIAGNOSIS — O36.5930 IUGR (INTRAUTERINE GROWTH RETARDATION) AFFECTING MOTHER, THIRD TRIMESTER, NOT APPLICABLE OR UNSPECIFIED FETUS: Primary | ICD-10-CM

## 2019-07-19 PROCEDURE — 99217 PR OBSERVATION CARE DISCHARGE MANAGEMENT: CPT | Performed by: OBSTETRICS & GYNECOLOGY

## 2019-07-19 PROCEDURE — G0378 HOSPITAL OBSERVATION PER HR: HCPCS

## 2019-07-19 RX ORDER — ONDANSETRON 4 MG/1
4 TABLET, ORALLY DISINTEGRATING ORAL EVERY 8 HOURS PRN
Status: DISCONTINUED | OUTPATIENT
Start: 2019-07-19 | End: 2019-07-19 | Stop reason: HOSPADM

## 2019-07-19 RX ORDER — ONDANSETRON 4 MG/1
TABLET, FILM COATED ORAL
Status: DISCONTINUED
Start: 2019-07-19 | End: 2019-07-19 | Stop reason: HOSPADM

## 2019-07-19 RX ADMIN — ONDANSETRON 4 MG: 4 TABLET, ORALLY DISINTEGRATING ORAL at 09:26

## 2019-07-19 NOTE — H&P
[facility]  Obstetric History and Physical    Chief Complaint   Patient presents with   • Abdominal Cramping     pt states she has had lower back pain and abdominal cramping for the past 2 days. she also states she has been leaking some fluid but that she can not tell if it is urine or not. she reports good fetal movement. denies headache/vision changes/vaginal bleeding/abdominal tenderness. palpates soft and nontender. she reports having high BP at the dentist office today of 150/100.            Patient is a 24 y.o. female  currently at 32w5d, who presents with numerous complaints.    She presented with crampy lower back and abdominal pain.  Pain over the lower abdomen at worst 5 of 10 irregular radiating to the back denied any associated vaginal bleeding question rupture of membranes.  Nitrazine confirmed Valsalva negative.  Cervical length reassuring.  Risk  delivery felt low.    She reported her blood pressure was elevated at the dentist office were going to try and obtain these records reported to be in the 140-150 range and at home she had one blood pressure in the high 150s.  Since she has been here her blood pressures have not shown any that I see recorded in the hypertensive range.  One systolic in the high 130s.  She denies any history of chronic hypertension.  Denies any headaches visual changes or epigastric pain she is in the process of obtaining a 24-hour urine    Ultrasonography today also showed possible lagging abdominal circumference growth possible indicator of intrauterine growth restriction.         Obstetric History   #: 1, Date: , Sex: None, Weight: None, GA: None, Delivery: None, Apgar1: None, Apgar5: None, Living: None, Birth Comments: None    #: 2, Date: None, Sex: None, Weight: None, GA: None, Delivery: None, Apgar1: None, Apgar5: None, Living: None, Birth Comments: None       The following portions of the patients history were reviewed and updated as appropriate:  current medications, allergies, past medical history, past surgical history, past family history, past social history and problem list .       Prenatal Information:  Prenatal Results     Initial Prenatal Labs     Test Value Reference Range Date Time    Hemoglobin 12.8 g/dL 12.0 - 15.9 g/dL 03/07/19 1517    Hematocrit 38.4 % 34.0 - 46.6 % 03/07/19 1517    Platelets 293 10*3/mm3 140 - 450 10*3/mm3 07/18/19 1432    Rubella IgG 145.0 IU/mL 0.0 - 9.9 IU/mL 01/28/19 0950      Immune  Immune 01/28/19 0950    Hepatitis B SAg Negative  Negative 01/28/19 0950    Hepatitis C Ab Negative  Negative 01/28/19 0950    RPR Non-Reactive  Non-Reactive 01/28/19 0950    ABO A   01/28/19 0950    Rh Positive   01/28/19 0950    Antibody Screen Negative   01/28/19 0950    HIV Negative  Negative 01/28/19 0950    Urine Culture Mixed Janeth Isolated   01/28/19 0950    Gonorrhea Negative  Negative 01/28/19 0950    Chlamydia Negative  Negative 01/28/19 0950    TSH 1.050 mIU/mL 0.460 - 4.680 mIU/mL 03/07/19 1517          2nd and 3rd Trimester     Test Value Reference Range Date Time    Hemoglobin (repeated) 10.8 g/dL 12.0 - 15.9 g/dL 07/18/19 1432    Hematocrit (repeated) 31.4 % 34.0 - 46.6 % 07/18/19 1432    GCT 98 mg/dL mg/dL 06/06/19 1108    Antibody Screen (repeated)        GTT Fasting        GTT 1 Hr        GTT 2 Hr        GTT 3 Hr        Group B Strep              Drug Screening     Test Value Reference Range Date Time    Amphetamine Screen Negative  Negative 01/28/19 0950    Barbiturate Screen Negative  Negative 01/28/19 0950    Benzodiazepine Screen Negative  Negative 01/28/19 0950    Methadone Screen Negative  Negative 01/28/19 0950    Phencyclidine Screen        Opiates Screen Negative  Negative 01/28/19 0950    THC Screen Negative  Negative 01/28/19 0950    Cocaine Screen Negative  Negative 01/28/19 0950    Propoxyphene Screen        Buprenorphine Screen        Methamphetamine Screen        Oxycodone Screen Negative  Negative 01/28/19  0950    Tricyclic Antidepressants Screen              Other (Risk screening)     Test Value Reference Range Date Time    Varicella IgG <135 index Immune >165 index 01/28/19 0950    Parvovirus IgG        CMV IgG        Cystic Fibrosis        Hemoglobin electrophoresis        NIPT        MSAFP-4        AFP (for NTD only)                  External Prenatal Results     Pregnancy Outside Results - Transcribed From Office Records - See Scanned Records For Details     Test Value Date Time    Hgb 10.8 g/dL 07/18/19 1432    Hct 31.4 % 07/18/19 1432    ABO A  01/28/19 0950    Rh Positive  01/28/19 0950    Antibody Screen Negative  01/28/19 0950    Glucose Fasting GTT       Glucose Tolerance Test 1 hour       Glucose Tolerance Test 3 hour       Gonorrhea (discrete) Negative  01/28/19 0950    Chlamydia (discrete) Negative  01/28/19 0950    RPR Non-Reactive  01/28/19 0950    VDRL       Syphilis Antibody       Rubella 145.0 IU/mL 01/28/19 0950      Immune  01/28/19 0950    HBsAg Negative  01/28/19 0950    Herpes Simplex Virus PCR       Herpes Simplex VIrus Culture       HIV Negative  01/28/19 0950    Hep C RNA Quant PCR       Hep C Antibody Negative  01/28/19 0950    AFP       Group B Strep       GBS Susceptibility to Clindamycin       GBS Susceptibility to Erythromycin       Fetal Fibronectin       Genetic Testing, Maternal Blood             Drug Screening     Test Value Date Time    Urine Drug Screen       Amphetamine Screen Negative  01/28/19 0950    Barbiturate Screen Negative  01/28/19 0950    Benzodiazepine Screen Negative  01/28/19 0950    Methadone Screen Negative  01/28/19 0950    Phencyclidine Screen       Opiates Screen Negative  01/28/19 0950    THC Screen Negative  01/28/19 0950    Cocaine Screen       Propoxyphene Screen       Buprenorphine Screen       Methamphetamine Screen       Oxycodone Screen Negative  01/28/19 0950    Tricyclic Antidepressants Screen                    Past OB History:     Obstetric History        T0      L0     SAB1   TAB0   Ectopic0   Molar0   Multiple0   Live Births0       # Outcome Date GA Lbr Yonny/2nd Weight Sex Delivery Anes PTL Lv   2 Current            2017                   ALLERGIES:   No Known Allergies     Home Medications:     Prior to Admission medications    Medication Sig Start Date End Date Taking? Authorizing Provider   hydrOXYzine pamoate (VISTARIL) 25 MG capsule Take 1 capsule by mouth 3 (Three) Times a Day As Needed for Itching. 19  Yes Venice Carbone APRN   ondansetron (ZOFRAN) 4 MG tablet Take 1 tablet by mouth Every 8 (Eight) Hours As Needed for Nausea or Vomiting. 19 Yes Abida Archibald APRN   Prenatal Vit-Min-FA-Fish Oil ([facility] PRENATAL GUMMY) 0.4-113.5 MG chewable tablet Chew 1 dose Daily. Or any other prenatal (chewable) covered by insurance 3/7/19 3/7/20 Yes Roula Vega CNM   promethazine (PHENERGAN) 12.5 MG tablet Take 1 tablet by mouth Every 6 (Six) Hours As Needed for Nausea or Vomiting. 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; sent for evaluation      • Upper respiratory infection    • Urinary tract infectious disease    • Wheezing       Past Surgical History Past Surgical History:   Procedure Laterality Date   • ADENOIDECTOMY     • INJECTION OF MEDICATION  2012    Depo 150 mg (4)      • INJECTION OF MEDICATION  2013    Depo Provera (medroxyprogesterone acetate) (1)      • INJECTION OF MEDICATION  2011     Phenergan (1)      • INJECTION OF MEDICATION  12/19/2011    Toradol (2)      • TONSILLECTOMY        Family History: Family History   Problem Relation Age of Onset   • Breast cancer Other    • Lung cancer Other    • Stroke Other    • No Known Problems Mother    • No Known Problems Paternal Grandmother    • Lung cancer Maternal Grandmother    • Cancer Maternal Grandfather    • Hypotension Maternal Grandfather    • No Known Problems Brother    • No Known Problems Brother    • No Known Problems Brother    • No Known Problems Sister       Social History:  reports that she quit smoking about 4 months ago. Her smoking use included cigarettes. She has a 1.25 pack-year smoking history. She has never used smokeless tobacco.   reports that she does not drink alcohol.   reports that she does not use drugs.        Review of Systems                                                                                                                  Neuro no history of brain tumor    HENT no history of ear tumors    Eye no history of retinal tumors    Pulmonary no history of lung tumors    Cardiac no history of cardiac tumors    GI: No history of small bowel tumors    Musculoskeletal: No history of skeletal muscle tumors    Endocrine: No history of adrenal tumors    Lymphatic: No history of Hodgkin's disease    Renal: No history of renal cancer      Objective       Vital Signs Range for the last 24 hours  Temperature: Temp:  [98.2 °F (36.8 °C)] 98.2 °F (36.8 °C)   Temp Source: Temp src: Oral   BP: BP: (117-137)/(63-95) 132/76   Pulse: Heart Rate:  [] 105   Respirations: Resp:  [20] 20   SPO2: SpO2:  [98 %-100 %] 99 %   O2 Amount (l/min):     O2 Devices Device (Oxygen Therapy): room air   Weight:         OBGyn Exam  Constitutional: Appears to be in no acute distress; Eyes: sclera normal; Endocrine system: thyroid palpate is normal; Pulmonary system: lungs clear; Cardiovascular system: heart regular rate and rhythm;  Gastrointestinal system: abdomen soft nontender, active bowel sounds; Urologic system: CVA negative; Psychiatric: appropriate insight; Neurologic: gait within normal limits vaginal examination cervix closed per nursing staff      Last Labs  Lab Results   Component Value Date    WBC 18.71 (H) 2019    RBC 3.71 (L) 2019    HGB 10.8 (L) 2019    HCT 31.4 (L) 2019    MCV 84.6 2019    MCH 29.1 2019    MCHC 34.4 2019    RDW 13.1 2019    RDWSD 40.2 2019    MPV 11.2 2019     2019        Lab Results   Component Value Date    GLUCOSE 80 2019    BUN 6 2019    CREATININE 0.51 (L) 2019     2019    K 3.5 2019     2019    CO2 20.0 (L) 2019    CALCIUM 9.0 2019    PROTEINTOT 7.0 2019    ALBUMIN 3.30 (L) 2019    ALT 5 2019    AST 12 2019    ALKPHOS 176 (H) 2019    BILITOT 0.3 2019    EGFRIFNONA >150 2019    GLOB 3.7 2019    AGRATIO 0.9 2019    BCR 11.8 2019    ANIONGAP 14.0 2019       Lab Results   Component Value Date    HCGQUAL Negative 10/30/2018         Assessment/Plan:  1. 24 y.o. [id number].  #1  contractions cervical length reassuring no pattern of contractions on toco diameter low risk for  delivery #2 reported hypertension readings at dentist office and at home blood pressures have been good thus far here blood work reassuring urine protein creatinine ratio pending 24-hour urine pending #3 ultrasound by radiology department suggestive of lagging abdominal circumference growth possible intrauterine growth restriction we will plan for perinatology ultrasound in consultation                 This document has been electronically signed by Marcus Corbin MD on 2019 9:54 PM

## 2019-07-19 NOTE — DISCHARGE SUMMARY
[facility]  Kristal Moreno  : 1995  MRN: [id number]  CSN: [id number]    Discharge Summary      Date of Admission: 2019   Date of Discharge: 2019   Discharge Diagnosis: 1. Hypertension affecting pregnancy, third trimester   Procedures Performed:       Hospital Course: 23 yo female  currently at 32w6d with a chief complaint of elevated blood pressure. She reported have a reading of 150/100 at the dentist office and one similar prior reading at home. At the hospital, her blood pressure remained much lower with the highest reading of 138/79. She complained of abdominal pain and crampy low back pain. She denied headaches and vision changes through the stay. She will be discharged with orders to continue 24 hour urine protein and bring that to the lab tomorrow.  Initial ultrasonography showed possible lagging abdominal circumference growth with possible of IUGR but The  Group assessment showed a BPP 8/8 with estimated fetal weight corresponding to 93rd percentile.     Pending Studies:                                                                      Labs: 24 hour urine protein        Physical Exam   Constitutional: She is oriented to person, place, and time. Vital signs are normal. She appears well-developed and well-nourished. No distress.   HENT:   Head: Normocephalic and atraumatic.   Right Ear: Hearing normal.   Left Ear: Hearing normal.   Eyes: Conjunctivae and lids are normal. Pupils are equal, round, and reactive to light.   Neck: Neck supple. No JVD present. No thyromegaly present.   Cardiovascular: Normal rate, regular rhythm and normal heart sounds.   Pulmonary/Chest: Effort normal and breath sounds normal. No respiratory distress. She has no wheezes.   Abdominal: Soft. Normal appearance and bowel sounds are normal. There is no tenderness. There is no guarding.   gravid   Musculoskeletal: Normal range of motion. She exhibits no edema, tenderness or  deformity.        Lumbar back: She exhibits pain (crampy).   Lymphadenopathy:     She has no cervical adenopathy.   Neurological: She is alert and oriented to person, place, and time.   Skin: Skin is warm, dry and intact. No rash noted.   Psychiatric: She has a normal mood and affect. Her speech is normal and behavior is normal. Thought content normal.   Vitals reviewed.          Lab Results (last 72 hours)     Procedure Component Value Units Date/Time    Gardnerella vaginalis, Trichomonas vaginalis, Candida albicans, DNA - Swab, Vagina [[id number]] Collected:  07/18/19 1423    Specimen:  Swab from Vagina Updated:  07/18/19 1536     LISA ALBICANS Negative     GARDNERELLA VAGINALIS Negative     TRICHOMONAS VAGINALIS Negative    Comprehensive Metabolic Panel [[id number]]  (Abnormal) Collected:  07/18/19 1432    Specimen:  Blood Updated:  07/18/19 1519     Glucose 80 mg/dL      BUN 6 mg/dL      Creatinine 0.51 mg/dL      Sodium 136 mmol/L      Potassium 3.5 mmol/L      Chloride 102 mmol/L      CO2 20.0 mmol/L      Calcium 9.0 mg/dL      Total Protein 7.0 g/dL      Albumin 3.30 g/dL      ALT (SGPT) 5 U/L      AST (SGOT) 12 U/L      Alkaline Phosphatase 176 U/L      Total Bilirubin 0.3 mg/dL      eGFR Non African Amer >150 mL/min/1.73      Globulin 3.7 gm/dL      A/G Ratio 0.9 g/dL      BUN/Creatinine Ratio 11.8     Anion Gap 14.0 mmol/L     Narrative:       GFR Normal >60  Chronic Kidney Disease <60  Kidney Failure <15    Urinalysis With Microscopic - Urine, Clean Catch [[id number]] Collected:  07/18/19 1422    Specimen:  Urine, Clean Catch Updated:  07/18/19 1456    Narrative:       The following orders were created for panel order Urinalysis With Microscopic - Urine, Clean Catch.  Procedure                               Abnormality         Status                     ---------                               -----------         ------                     Urinalysis without micro...[[id number]]  Abnormal            Final  result               Urinalysis, Microscopic ...[[id number]]  Abnormal            Final result                 Please view results for these tests on the individual orders.    Urinalysis without microscopic (no culture) - Urine, Clean Catch [[id number]]  (Abnormal) Collected:  07/18/19 1422    Specimen:  Urine, Clean Catch Updated:  07/18/19 1456     Color, UA Yellow     Appearance, UA Cloudy     pH, UA 6.5     Specific Gravity, UA 1.006     Glucose, UA Negative     Ketones, UA Trace     Bilirubin, UA Negative     Blood, UA Trace     Protein, UA Negative     Leuk Esterase, UA Large (3+)     Nitrite, UA Negative     Urobilinogen, UA 1.0 E.U./dL    Urinalysis, Microscopic Only - Urine, Clean Catch [[id number]]  (Abnormal) Collected:  07/18/19 1422    Specimen:  Urine, Clean Catch Updated:  07/18/19 1456     RBC, UA 0-2 /HPF      WBC, UA Too Numerous to Count /HPF      Bacteria, UA 1+ /HPF      Squamous Epithelial Cells, UA 6-12 /HPF      Hyaline Casts, UA 7-12 /LPF      Methodology Automated Microscopy    CBC & Differential [[id number]] Collected:  07/18/19 1432    Specimen:  Blood Updated:  07/18/19 1449    Narrative:       The following orders were created for panel order CBC & Differential.  Procedure                               Abnormality         Status                     ---------                               -----------         ------                     CBC Auto Differential[[id number]]        Abnormal            Final result                 Please view results for these tests on the individual orders.    CBC Auto Differential [[id number]]  (Abnormal) Collected:  07/18/19 1432    Specimen:  Blood Updated:  07/18/19 1449     WBC 18.71 10*3/mm3      RBC 3.71 10*6/mm3      Hemoglobin 10.8 g/dL      Hematocrit 31.4 %      MCV 84.6 fL      MCH 29.1 pg      MCHC 34.4 g/dL      RDW 13.1 %      RDW-SD 40.2 fl      MPV 11.2 fL      Platelets 293 10*3/mm3      Neutrophil % 71.2 %      Lymphocyte % 18.4 %      Monocyte %  7.7 %      Eosinophil % 1.7 %      Basophil % 0.2 %      Immature Grans % 0.8 %      Neutrophils, Absolute 13.31 10*3/mm3      Lymphocytes, Absolute 3.44 10*3/mm3      Monocytes, Absolute 1.45 10*3/mm3      Eosinophils, Absolute 0.32 10*3/mm3      Basophils, Absolute 0.04 10*3/mm3      Immature Grans, Absolute 0.15 10*3/mm3      nRBC 0.0 /100 WBC                  Discharge Activity: Activity Instructions     Measure Blood Pressure      Monitor blood pressure at home and give Dr. Corbin/ Dr. Lacy a call immediately if   -SBP (top number) >150   -DBP (bottom number) > 105  -You experience symptoms, such as headaches or spots in vision         Discharge Medications:    Your medication list      ASK your doctor about these medications      Instructions Last Dose Given Next Dose Due   [employer] PRENATAL GUMMY 0.4-113.5 MG chewable tablet      Chew 1 dose Daily. Or any other prenatal (chewable) covered by insurance       hydrOXYzine pamoate 25 MG capsule  Commonly known as:  VISTARIL      Take 1 capsule by mouth 3 (Three) Times a Day As Needed for Itching.       ondansetron 4 MG tablet  Commonly known as:  ZOFRAN      Take 1 tablet by mouth Every 8 (Eight) Hours As Needed for Nausea or Vomiting.       promethazine 12.5 MG tablet  Commonly known as:  PHENERGAN      Take 1 tablet by mouth Every 6 (Six) Hours As Needed for Nausea or Vomiting.             Discharge Disposition: home   Follow-up: Future Appointments   Date Time Provider Department Center   7/25/2019 10:45 AM Venice Carbone, APRN [facility] None        Condition at Discharge: Stable   Discharge Diet: Regular           This document has been electronically signed by Lorrie Mena MD on July 19, 2019 3:24 PM      I have seen and evaluated the patient.  I have discussed the case with the resident. I have reviewed the notes, assessment and plan, and/or procedures performed by the resident. I concur with the resident’s documentation.        This document has been  electronically signed by Ethan Lacy MD on July 19, 2019 11:24 PM

## 2019-07-22 ENCOUNTER — ROUTINE PRENATAL (OUTPATIENT)
Dept: OBSTETRICS AND GYNECOLOGY | Facility: CLINIC | Age: 24
End: 2019-07-22

## 2019-07-22 ENCOUNTER — LAB (OUTPATIENT)
Dept: LAB | Facility: HOSPITAL | Age: 24
End: 2019-07-22

## 2019-07-22 ENCOUNTER — APPOINTMENT (OUTPATIENT)
Dept: LAB | Facility: HOSPITAL | Age: 24
End: 2019-07-22

## 2019-07-22 VITALS — WEIGHT: 174 LBS | DIASTOLIC BLOOD PRESSURE: 81 MMHG | SYSTOLIC BLOOD PRESSURE: 122 MMHG | BODY MASS INDEX: 28.08 KG/M2

## 2019-07-22 DIAGNOSIS — O98.513 HERPES SIMPLEX TYPE 2 (HSV-2) INFECTION AFFECTING PREGNANCY, ANTEPARTUM, THIRD TRIMESTER: ICD-10-CM

## 2019-07-22 DIAGNOSIS — O99.019 MATERNAL ANEMIA IN PREGNANCY, ANTEPARTUM: ICD-10-CM

## 2019-07-22 DIAGNOSIS — B00.9 HERPES SIMPLEX TYPE 2 (HSV-2) INFECTION AFFECTING PREGNANCY, ANTEPARTUM, THIRD TRIMESTER: ICD-10-CM

## 2019-07-22 DIAGNOSIS — O16.3 HYPERTENSION AFFECTING PREGNANCY IN THIRD TRIMESTER: Primary | ICD-10-CM

## 2019-07-22 DIAGNOSIS — O16.3 HYPERTENSION AFFECTING PREGNANCY IN THIRD TRIMESTER: ICD-10-CM

## 2019-07-22 DIAGNOSIS — Z3A.33 33 WEEKS GESTATION OF PREGNANCY: ICD-10-CM

## 2019-07-22 LAB
ALBUMIN SERPL-MCNC: 3.3 G/DL (ref 3.5–5.2)
ALBUMIN/GLOB SERPL: 0.9 G/DL
ALP SERPL-CCNC: 183 U/L (ref 39–117)
ALT SERPL W P-5'-P-CCNC: 5 U/L (ref 1–33)
ANION GAP SERPL CALCULATED.3IONS-SCNC: 12 MMOL/L (ref 5–15)
AST SERPL-CCNC: 13 U/L (ref 1–32)
BASOPHILS # BLD AUTO: 0.03 10*3/MM3 (ref 0–0.2)
BASOPHILS NFR BLD AUTO: 0.2 % (ref 0–1.5)
BILIRUB SERPL-MCNC: 0.3 MG/DL (ref 0.2–1.2)
BUN BLD-MCNC: 5 MG/DL (ref 6–20)
BUN/CREAT SERPL: 9.6 (ref 7–25)
CALCIUM SPEC-SCNC: 9.2 MG/DL (ref 8.6–10.5)
CHLORIDE SERPL-SCNC: 104 MMOL/L (ref 98–107)
CO2 SERPL-SCNC: 22 MMOL/L (ref 22–29)
COLLECT DURATION TIME UR: 24 HRS
CREAT BLD-MCNC: 0.52 MG/DL (ref 0.57–1)
CREAT UR-MCNC: 40.4 MG/DL
DEPRECATED RDW RBC AUTO: 38.6 FL (ref 37–54)
EOSINOPHIL # BLD AUTO: 0.45 10*3/MM3 (ref 0–0.4)
EOSINOPHIL NFR BLD AUTO: 2.7 % (ref 0.3–6.2)
ERYTHROCYTE [DISTWIDTH] IN BLOOD BY AUTOMATED COUNT: 12.7 % (ref 12.3–15.4)
GFR SERPL CREATININE-BSD FRML MDRD: 147 ML/MIN/1.73
GLOBULIN UR ELPH-MCNC: 3.8 GM/DL
GLUCOSE BLD-MCNC: 80 MG/DL (ref 65–99)
HCT VFR BLD AUTO: 32.5 % (ref 34–46.6)
HGB BLD-MCNC: 10.9 G/DL (ref 12–15.9)
HYPOCHROMIA BLD QL: NORMAL
IMM GRANULOCYTES # BLD AUTO: 0.08 10*3/MM3 (ref 0–0.05)
IMM GRANULOCYTES NFR BLD AUTO: 0.5 % (ref 0–0.5)
LYMPHOCYTES # BLD AUTO: 4.14 10*3/MM3 (ref 0.7–3.1)
LYMPHOCYTES NFR BLD AUTO: 24.9 % (ref 19.6–45.3)
MCH RBC QN AUTO: 28.2 PG (ref 26.6–33)
MCHC RBC AUTO-ENTMCNC: 33.5 G/DL (ref 31.5–35.7)
MCV RBC AUTO: 84.2 FL (ref 79–97)
MONOCYTES # BLD AUTO: 0.98 10*3/MM3 (ref 0.1–0.9)
MONOCYTES NFR BLD AUTO: 5.9 % (ref 5–12)
NEUTROPHILS # BLD AUTO: 10.94 10*3/MM3 (ref 1.7–7)
NEUTROPHILS NFR BLD AUTO: 65.8 % (ref 42.7–76)
NRBC BLD AUTO-RTO: 0 /100 WBC (ref 0–0.2)
PLAT MORPH BLD: NORMAL
PLATELET # BLD AUTO: 357 10*3/MM3 (ref 140–450)
PMV BLD AUTO: 10.8 FL (ref 6–12)
POLYCHROMASIA BLD QL SMEAR: NORMAL
POTASSIUM BLD-SCNC: 3.8 MMOL/L (ref 3.5–5.2)
PROT 24H UR-MRATE: 135 MG/24HOURS (ref 0–150)
PROT SERPL-MCNC: 7.1 G/DL (ref 6–8.5)
PROT UR-MCNC: 9 MG/DL
PROT UR-MCNC: 9 MG/DL
PROT/CREAT UR: 222.8 MG/G CREA (ref 0–200)
RBC # BLD AUTO: 3.86 10*6/MM3 (ref 3.77–5.28)
SODIUM BLD-SCNC: 138 MMOL/L (ref 136–145)
SPECIMEN VOL 24H UR: 1500 ML
WBC MORPH BLD: NORMAL
WBC NRBC COR # BLD: 16.62 10*3/MM3 (ref 3.4–10.8)

## 2019-07-22 PROCEDURE — 36415 COLL VENOUS BLD VENIPUNCTURE: CPT | Performed by: OBSTETRICS & GYNECOLOGY

## 2019-07-22 PROCEDURE — 81050 URINALYSIS VOLUME MEASURE: CPT

## 2019-07-22 PROCEDURE — 99213 OFFICE O/P EST LOW 20 MIN: CPT | Performed by: OBSTETRICS & GYNECOLOGY

## 2019-07-22 PROCEDURE — 84156 ASSAY OF PROTEIN URINE: CPT

## 2019-07-22 PROCEDURE — 82570 ASSAY OF URINE CREATININE: CPT | Performed by: OBSTETRICS & GYNECOLOGY

## 2019-07-22 PROCEDURE — 84156 ASSAY OF PROTEIN URINE: CPT | Performed by: OBSTETRICS & GYNECOLOGY

## 2019-07-22 PROCEDURE — 85025 COMPLETE CBC W/AUTO DIFF WBC: CPT | Performed by: OBSTETRICS & GYNECOLOGY

## 2019-07-22 PROCEDURE — 85007 BL SMEAR W/DIFF WBC COUNT: CPT | Performed by: OBSTETRICS & GYNECOLOGY

## 2019-07-22 PROCEDURE — 80053 COMPREHEN METABOLIC PANEL: CPT | Performed by: OBSTETRICS & GYNECOLOGY

## 2019-07-23 PROBLEM — O99.019 MATERNAL ANEMIA IN PREGNANCY, ANTEPARTUM: Status: ACTIVE | Noted: 2019-07-23

## 2019-07-23 PROBLEM — B00.9 HERPES SIMPLEX TYPE 2 (HSV-2) INFECTION AFFECTING PREGNANCY, ANTEPARTUM, THIRD TRIMESTER: Status: ACTIVE | Noted: 2019-07-23

## 2019-07-23 PROBLEM — O98.513 HERPES SIMPLEX TYPE 2 (HSV-2) INFECTION AFFECTING PREGNANCY, ANTEPARTUM, THIRD TRIMESTER: Status: ACTIVE | Noted: 2019-07-23

## 2019-07-23 NOTE — PROGRESS NOTES
CC: Concern for elevated blood pressure.    Patient Active Problem List   Diagnosis   • Syncopal episodes   • Subchorionic hemorrhage in first trimester   • Hypertension affecting pregnancy in third trimester   • Maternal anemia in pregnancy, antepartum   • Herpes simplex type 2 (HSV-2) infection affecting pregnancy, antepartum, third trimester       HPI: 24 y.o.   GA: 33w3d TYSON: 2019, by Last Menstrual Period  Patient presents for evaluation after admission to the hospital.  Was admitted to the hospital approximately 5 days ago with concern for elevated blood pressures that were found during a dental visit.  Her hospital admission patient did not have any elevated blood pressures and no other symptoms of preeclampsia labs were drawn which were negative, and 24-hour urine protein is pending.  She was instructed to recheck blood pressures at home and if they were elevated she was to call and come in for evaluation.  At home today blood pressures were in the 160s over 100s and patient called to come in to be evaluated.  Denies any headaches vision changes or right upper quadrant pain.  Does say that she feels a little tired and shaky but had only eaten breakfast and a glass of orange juice today.  Encourage patient to eat several small meals throughout the day.  Also on review of labs found that patient was anemic, will start her on iron to help with anemia.  It is very concerned about progression of pregnancy.    Labs:   No results found for: HGBA1C  Glucose   Date Value Ref Range Status   2019 80 65 - 99 mg/dL Final   2019 80 65 - 99 mg/dL Final   2019 102 (H) 65 - 99 mg/dL Final     Last Completed Pap Smear       Status Date      PAP SMEAR No completions recorded          Radiology:    Each of the above were reviewed and integrated into prenatal care plan.    P/E:  See Vitals flowsheet    A/P: 24-year-old -0-1-0 at 33 weeks and 2 days with hypertension of pregnancy, no elevated  blood pressures in clinic today.  Ultrasound was performed with 8 out of 8 BPP and an EMILIANO of 17.45 cm.  Repeat labs showed a stable H&H and normal platelet count.  CMP showed normal creatinine as well.  24-hour urine protein is currently pending on this patient overall evaluation was reassuring at this time and low concern for preeclampsia at this time.  Patient has follow-up appointment in our clinic in 3 days will return at that time will consider BPP if any fetal concerns.  Will recheck blood pressure, hopefully 24-hour urine protein results will be back at that time.  Strict return precautions for headache vision changes or right upper quadrant pain.  Recommended the patient no longer check blood pressure at home as I am concerned that her cuff may be inaccurate.  Patient to return sooner as needed.    1. Routine prenatal care   Encourage PNV   PTL precautions     2.    Diagnosis Plan   1. Hypertension affecting pregnancy in third trimester  US Fetal Biophysical Profile;Without Non-Stress Testing    CBC & Differential    Comprehensive Metabolic Panel    CBC Auto Differential    Scan Slide    Scan Slide   2. 33 weeks gestation of pregnancy     3. Maternal anemia in pregnancy, antepartum     4. Herpes simplex type 2 (HSV-2) infection affecting pregnancy, antepartum, third trimester

## 2019-07-24 DIAGNOSIS — O36.5930 IUGR (INTRAUTERINE GROWTH RETARDATION) AFFECTING MOTHER, THIRD TRIMESTER, NOT APPLICABLE OR UNSPECIFIED FETUS: ICD-10-CM

## 2019-07-25 ENCOUNTER — ROUTINE PRENATAL (OUTPATIENT)
Dept: OBSTETRICS AND GYNECOLOGY | Facility: CLINIC | Age: 24
End: 2019-07-25

## 2019-07-25 VITALS — SYSTOLIC BLOOD PRESSURE: 118 MMHG | BODY MASS INDEX: 27.73 KG/M2 | WEIGHT: 171.8 LBS | DIASTOLIC BLOOD PRESSURE: 68 MMHG

## 2019-07-25 DIAGNOSIS — O47.9 UTERINE CONTRACTIONS DURING PREGNANCY: ICD-10-CM

## 2019-07-25 DIAGNOSIS — Z3A.33 33 WEEKS GESTATION OF PREGNANCY: Primary | ICD-10-CM

## 2019-07-25 LAB
BLOODY SPECIMEN?: NO
FIBRONECTIN FETAL VAG QL: NEGATIVE

## 2019-07-25 PROCEDURE — 99213 OFFICE O/P EST LOW 20 MIN: CPT | Performed by: ADVANCED PRACTICE MIDWIFE

## 2019-07-25 PROCEDURE — 82731 ASSAY OF FETAL FIBRONECTIN: CPT | Performed by: ADVANCED PRACTICE MIDWIFE

## 2019-07-25 NOTE — PROGRESS NOTES
CC: CLOTILDE visit    Patient Active Problem List   Diagnosis   • Syncopal episodes   • Subchorionic hemorrhage in first trimester   • Hypertension affecting pregnancy in third trimester   • Maternal anemia in pregnancy, antepartum   • Herpes simplex type 2 (HSV-2) infection affecting pregnancy, antepartum, third trimester       HPI: 24 y.o.   GA: 33w5d TYSON: 2019, by Last Menstrual Period  Here for follow up prenatal care.     Labs:   No results found for: HGBA1C  Glucose   Date Value Ref Range Status   2019 80 65 - 99 mg/dL Final   2019 80 65 - 99 mg/dL Final   2019 102 (H) 65 - 99 mg/dL Final     Last Completed Pap Smear       Status Date      PAP SMEAR No completions recorded          Radiology:    Each of the above were reviewed and integrated into prenatal care plan.    P/E:  See Vitals flowsheet    A/P:    1. Routine prenatal care   Encourage PNV   PTL precautions     2.    Diagnosis Plan   1. 33 weeks gestation of pregnancy     2. Uterine contractions during pregnancy  Fetal Fibronectin - Vaginal Fluid, Cervix  Magnesium  UCs since being discharged from hospital. They will be regular for a couple of hours, then stop.They are painful.  Instructed to cont with hydration & start Magnesium OTC.   \

## 2019-08-08 ENCOUNTER — ROUTINE PRENATAL (OUTPATIENT)
Dept: OBSTETRICS AND GYNECOLOGY | Facility: CLINIC | Age: 24
End: 2019-08-08

## 2019-08-08 VITALS — WEIGHT: 178 LBS | SYSTOLIC BLOOD PRESSURE: 125 MMHG | DIASTOLIC BLOOD PRESSURE: 87 MMHG | BODY MASS INDEX: 28.73 KG/M2

## 2019-08-08 DIAGNOSIS — Z3A.35 35 WEEKS GESTATION OF PREGNANCY: ICD-10-CM

## 2019-08-08 DIAGNOSIS — B00.9 HSV-1 (HERPES SIMPLEX VIRUS 1) INFECTION: ICD-10-CM

## 2019-08-08 DIAGNOSIS — M54.9 BACK PAIN AFFECTING PREGNANCY IN SECOND TRIMESTER: Primary | ICD-10-CM

## 2019-08-08 DIAGNOSIS — O99.891 BACK PAIN AFFECTING PREGNANCY IN SECOND TRIMESTER: Primary | ICD-10-CM

## 2019-08-08 DIAGNOSIS — Z36.85 ANTENATAL SCREENING FOR STREPTOCOCCUS B: ICD-10-CM

## 2019-08-08 DIAGNOSIS — R10.2 PAIN OF ROUND LIGAMENT COMPLICATING PREGNANCY, ANTEPARTUM: ICD-10-CM

## 2019-08-08 DIAGNOSIS — L73.9 HAIR FOLLICLE INFECTION: ICD-10-CM

## 2019-08-08 DIAGNOSIS — O26.899 PAIN OF ROUND LIGAMENT COMPLICATING PREGNANCY, ANTEPARTUM: ICD-10-CM

## 2019-08-08 PROBLEM — O99.019 MATERNAL ANEMIA IN PREGNANCY, ANTEPARTUM: Status: RESOLVED | Noted: 2019-07-23 | Resolved: 2019-08-08

## 2019-08-08 PROBLEM — O16.3 HYPERTENSION AFFECTING PREGNANCY IN THIRD TRIMESTER: Status: RESOLVED | Noted: 2019-07-18 | Resolved: 2019-08-08

## 2019-08-08 PROBLEM — O41.8X10 SUBCHORIONIC HEMORRHAGE IN FIRST TRIMESTER: Status: RESOLVED | Noted: 2019-02-11 | Resolved: 2019-08-08

## 2019-08-08 PROBLEM — O46.8X1 SUBCHORIONIC HEMORRHAGE IN FIRST TRIMESTER: Status: RESOLVED | Noted: 2019-02-11 | Resolved: 2019-08-08

## 2019-08-08 PROCEDURE — 87653 STREP B DNA AMP PROBE: CPT | Performed by: NURSE PRACTITIONER

## 2019-08-08 PROCEDURE — 99214 OFFICE O/P EST MOD 30 MIN: CPT | Performed by: NURSE PRACTITIONER

## 2019-08-08 RX ORDER — VALACYCLOVIR HYDROCHLORIDE 500 MG/1
500 TABLET, FILM COATED ORAL DAILY
Refills: 4 | COMMUNITY
Start: 2019-07-24 | End: 2019-08-24 | Stop reason: HOSPADM

## 2019-08-08 RX ORDER — CLINDAMYCIN PHOSPHATE 10 MG/G
GEL TOPICAL 2 TIMES DAILY
Qty: 30 G | Refills: 3 | Status: SHIPPED | OUTPATIENT
Start: 2019-08-08 | End: 2019-10-02

## 2019-08-08 NOTE — PROGRESS NOTES
CC: CLOTILDE visit; hx reviewed and updated    Patient Active Problem List   Diagnosis   • HSV-1 (herpes simplex virus 1) infection   • Pain of round ligament complicating pregnancy, antepartum   • Back pain affecting pregnancy in third trimester       HPI: 24 y.o.   GA: 35w5d TYSON: 2019, by Last Menstrual Period  Here for follow up prenatal care. C/O chronic ingrown hair follicles in the groin area that are painful. Denies dysuria, vb, LOF, abnormal vaginal d/c, decreased FM, headaches or heartburn.    Labs:   No results found for: HGBA1C  Glucose   Date Value Ref Range Status   2019 80 65 - 99 mg/dL Final   2019 80 65 - 99 mg/dL Final   2019 102 (H) 65 - 99 mg/dL Final     Last Completed Pap Smear       Status Date      PAP SMEAR No completions recorded          Radiology:    Each of the above were reviewed and integrated into prenatal care plan.    P/E:  See Vitals flowsheet. GBS obtained.     A/P:    1. Routine prenatal care   Encourage PNV   PTL precautions     2.    Diagnosis Plan   1. Back pain affecting pregnancy in third trimester  Comfort measures reviewed   2.  screening for streptococcus B  Group B Strep (Molecular) - Swab, Vaginal/Rectum    Group B Strep (Molecular) - Swab, Vaginal/Rectum   3. Hair follicle infection  Exfoliate well and can use clindamycin topical gel to decrease surface bacteria   4. HSV-1 (herpes simplex virus 1) infection  Wants to continue taking Valtrex to prevent cold sore outbreaks. States that she has never been diagnosed HSV type 2   5. Pain of round ligament complicating pregnancy, antepartum  Comfort measures reviewed   6. 35 weeks gestation of pregnancy

## 2019-08-09 LAB — GROUP B STREP, DNA: NEGATIVE

## 2019-08-15 ENCOUNTER — ROUTINE PRENATAL (OUTPATIENT)
Dept: OBSTETRICS AND GYNECOLOGY | Facility: CLINIC | Age: 24
End: 2019-08-15

## 2019-08-15 VITALS — WEIGHT: 180 LBS | DIASTOLIC BLOOD PRESSURE: 75 MMHG | SYSTOLIC BLOOD PRESSURE: 130 MMHG | BODY MASS INDEX: 29.05 KG/M2

## 2019-08-15 DIAGNOSIS — O26.899 PAIN OF ROUND LIGAMENT COMPLICATING PREGNANCY, ANTEPARTUM: ICD-10-CM

## 2019-08-15 DIAGNOSIS — R10.2 PAIN OF ROUND LIGAMENT COMPLICATING PREGNANCY, ANTEPARTUM: ICD-10-CM

## 2019-08-15 DIAGNOSIS — O99.891 BACK PAIN AFFECTING PREGNANCY IN SECOND TRIMESTER: Primary | ICD-10-CM

## 2019-08-15 DIAGNOSIS — M54.9 BACK PAIN AFFECTING PREGNANCY IN SECOND TRIMESTER: Primary | ICD-10-CM

## 2019-08-15 DIAGNOSIS — Z3A.36 36 WEEKS GESTATION OF PREGNANCY: ICD-10-CM

## 2019-08-15 DIAGNOSIS — B00.9 HSV-1 (HERPES SIMPLEX VIRUS 1) INFECTION: ICD-10-CM

## 2019-08-15 PROCEDURE — 99213 OFFICE O/P EST LOW 20 MIN: CPT | Performed by: NURSE PRACTITIONER

## 2019-08-15 NOTE — PROGRESS NOTES
CC: CLOTILDE visit; hx reviewed, no changes    Patient Active Problem List   Diagnosis   • HSV-1 (herpes simplex virus 1) infection   • Pain of round ligament complicating pregnancy, antepartum   • Back pain affecting pregnancy in third trimester       HPI: 24 y.o.   GA: 36w5d TYSON: 2019, by Last Menstrual Period  Here for follow up prenatal care. Denies dysuria, vb, LOF, regular contractions, headaches, constipation or heartburn.    Labs: GBS negative  No results found for: HGBA1C  Glucose   Date Value Ref Range Status   2019 80 65 - 99 mg/dL Final   2019 80 65 - 99 mg/dL Final   2019 102 (H) 65 - 99 mg/dL Final     Last Completed Pap Smear       Status Date      PAP SMEAR No completions recorded          Radiology:    Each of the above were reviewed and integrated into prenatal care plan.    P/E:  See Vitals flowsheet    A/P:    1. Routine prenatal care   Encourage PNV   PTL precautions     2.    Diagnosis Plan   1. Back pain affecting pregnancy in third trimester  Managing with comfort measures   2. HSV-1 (herpes simplex virus 1) infection  Taking Valtrex daily   3. Pain of round ligament complicating pregnancy, antepartum  Managing with comfort measures   4. 36 weeks gestation of pregnancy

## 2019-08-16 NOTE — PROGRESS NOTES
The rendering provider is unable to close this note.  I,YUNG Breen, have reviewed this note to ensure no immediate additional follow-up is warranted, and am closing the note for administrative purposes.

## 2019-08-21 PROBLEM — R10.2 PAIN OF ROUND LIGAMENT COMPLICATING PREGNANCY, ANTEPARTUM: Status: RESOLVED | Noted: 2019-08-08 | Resolved: 2019-08-21

## 2019-08-21 PROBLEM — M54.9 BACK PAIN AFFECTING PREGNANCY IN SECOND TRIMESTER: Status: RESOLVED | Noted: 2019-08-08 | Resolved: 2019-08-21

## 2019-08-21 PROBLEM — O99.891 BACK PAIN AFFECTING PREGNANCY IN SECOND TRIMESTER: Status: RESOLVED | Noted: 2019-08-08 | Resolved: 2019-08-21

## 2019-08-21 PROBLEM — Z34.90 SUPERVISION OF NORMAL PREGNANCY: Status: ACTIVE | Noted: 2019-08-21

## 2019-08-21 PROBLEM — O26.899 PAIN OF ROUND LIGAMENT COMPLICATING PREGNANCY, ANTEPARTUM: Status: RESOLVED | Noted: 2019-08-08 | Resolved: 2019-08-21

## 2019-08-22 ENCOUNTER — ROUTINE PRENATAL (OUTPATIENT)
Dept: OBSTETRICS AND GYNECOLOGY | Facility: CLINIC | Age: 24
End: 2019-08-22

## 2019-08-22 ENCOUNTER — HOSPITAL ENCOUNTER (INPATIENT)
Facility: HOSPITAL | Age: 24
LOS: 2 days | Discharge: HOME | End: 2019-08-24
Attending: OBSTETRICS & GYNECOLOGY | Admitting: OBSTETRICS & GYNECOLOGY

## 2019-08-22 VITALS — BODY MASS INDEX: 29.86 KG/M2 | DIASTOLIC BLOOD PRESSURE: 91 MMHG | WEIGHT: 185 LBS | SYSTOLIC BLOOD PRESSURE: 138 MMHG

## 2019-08-22 DIAGNOSIS — O13.3 GESTATIONAL HYPERTENSION WITHOUT SIGNIFICANT PROTEINURIA IN THIRD TRIMESTER: ICD-10-CM

## 2019-08-22 DIAGNOSIS — Z34.03 ENCOUNTER FOR SUPERVISION OF NORMAL FIRST PREGNANCY IN THIRD TRIMESTER: Primary | ICD-10-CM

## 2019-08-22 DIAGNOSIS — Z3A.37 37 WEEKS GESTATION OF PREGNANCY: ICD-10-CM

## 2019-08-22 DIAGNOSIS — B00.9 HSV-1 (HERPES SIMPLEX VIRUS 1) INFECTION: ICD-10-CM

## 2019-08-22 PROBLEM — O13.9 GESTATIONAL HYPERTENSION: Status: ACTIVE | Noted: 2019-08-22

## 2019-08-22 LAB
ABO GROUP BLD: NORMAL
ALBUMIN SERPL-MCNC: 3.2 G/DL (ref 3.5–5.2)
ALBUMIN/GLOB SERPL: 0.9 G/DL
ALP SERPL-CCNC: 200 U/L (ref 39–117)
ALT SERPL W P-5'-P-CCNC: 7 U/L (ref 1–33)
AMPHET+METHAMPHET UR QL: NEGATIVE
AMPHETAMINES UR QL: NEGATIVE
ANION GAP SERPL CALCULATED.3IONS-SCNC: 15 MMOL/L (ref 5–15)
AST SERPL-CCNC: 16 U/L (ref 1–32)
BACTERIA UR QL AUTO: ABNORMAL /HPF
BARBITURATES UR QL SCN: NEGATIVE
BENZODIAZ UR QL SCN: NEGATIVE
BILIRUB SERPL-MCNC: 0.3 MG/DL (ref 0.2–1.2)
BILIRUB UR QL STRIP: NEGATIVE
BLD GP AB SCN SERPL QL: NEGATIVE
BUN BLD-MCNC: 5 MG/DL (ref 6–20)
BUN/CREAT SERPL: 7.9 (ref 7–25)
BUPRENORPHINE SERPL-MCNC: NEGATIVE NG/ML
CALCIUM SPEC-SCNC: 9.1 MG/DL (ref 8.6–10.5)
CANNABINOIDS SERPL QL: NEGATIVE
CHLORIDE SERPL-SCNC: 105 MMOL/L (ref 98–107)
CLARITY UR: CLEAR
CO2 SERPL-SCNC: 19 MMOL/L (ref 22–29)
COCAINE UR QL: NEGATIVE
COLOR UR: YELLOW
CREAT BLD-MCNC: 0.63 MG/DL (ref 0.57–1)
DEPRECATED RDW RBC AUTO: 37.7 FL (ref 37–54)
ERYTHROCYTE [DISTWIDTH] IN BLOOD BY AUTOMATED COUNT: 13.2 % (ref 12.3–15.4)
GFR SERPL CREATININE-BSD FRML MDRD: 118 ML/MIN/1.73
GLOBULIN UR ELPH-MCNC: 3.7 GM/DL
GLUCOSE BLD-MCNC: 101 MG/DL (ref 65–99)
GLUCOSE UR STRIP-MCNC: NEGATIVE MG/DL
HCT VFR BLD AUTO: 29.5 % (ref 34–46.6)
HGB BLD-MCNC: 10.1 G/DL (ref 12–15.9)
HGB UR QL STRIP.AUTO: ABNORMAL
HYALINE CASTS UR QL AUTO: ABNORMAL /LPF
KETONES UR QL STRIP: NEGATIVE
LEUKOCYTE ESTERASE UR QL STRIP.AUTO: ABNORMAL
Lab: NORMAL
MCH RBC QN AUTO: 27.1 PG (ref 26.6–33)
MCHC RBC AUTO-ENTMCNC: 34.2 G/DL (ref 31.5–35.7)
MCV RBC AUTO: 79.1 FL (ref 79–97)
METHADONE UR QL SCN: NEGATIVE
NITRITE UR QL STRIP: NEGATIVE
OPIATES UR QL: NEGATIVE
OXYCODONE UR QL SCN: NEGATIVE
PCP UR QL SCN: NEGATIVE
PH UR STRIP.AUTO: 6.5 [PH] (ref 5–9)
PLATELET # BLD AUTO: 277 10*3/MM3 (ref 140–450)
PMV BLD AUTO: 11.6 FL (ref 6–12)
POTASSIUM BLD-SCNC: 3.2 MMOL/L (ref 3.5–5.2)
PROPOXYPH UR QL: NEGATIVE
PROT SERPL-MCNC: 6.9 G/DL (ref 6–8.5)
PROT UR QL STRIP: NEGATIVE
RBC # BLD AUTO: 3.73 10*6/MM3 (ref 3.77–5.28)
RBC # UR: ABNORMAL /HPF
REF LAB TEST METHOD: ABNORMAL
RH BLD: POSITIVE
SODIUM BLD-SCNC: 139 MMOL/L (ref 136–145)
SP GR UR STRIP: 1.01 (ref 1–1.03)
SQUAMOUS #/AREA URNS HPF: ABNORMAL /HPF
T&S EXPIRATION DATE: NORMAL
TRICYCLICS UR QL SCN: NEGATIVE
UROBILINOGEN UR QL STRIP: ABNORMAL
WBC NRBC COR # BLD: 21.28 10*3/MM3 (ref 3.4–10.8)
WBC UR QL AUTO: ABNORMAL /HPF

## 2019-08-22 PROCEDURE — 81001 URINALYSIS AUTO W/SCOPE: CPT | Performed by: OBSTETRICS & GYNECOLOGY

## 2019-08-22 PROCEDURE — 25010000002 ONDANSETRON PER 1 MG

## 2019-08-22 PROCEDURE — 80306 DRUG TEST PRSMV INSTRMNT: CPT | Performed by: OBSTETRICS & GYNECOLOGY

## 2019-08-22 PROCEDURE — 85027 COMPLETE CBC AUTOMATED: CPT | Performed by: OBSTETRICS & GYNECOLOGY

## 2019-08-22 PROCEDURE — 80053 COMPREHEN METABOLIC PANEL: CPT | Performed by: OBSTETRICS & GYNECOLOGY

## 2019-08-22 PROCEDURE — 99213 OFFICE O/P EST LOW 20 MIN: CPT | Performed by: OBSTETRICS & GYNECOLOGY

## 2019-08-22 PROCEDURE — 86850 RBC ANTIBODY SCREEN: CPT | Performed by: OBSTETRICS & GYNECOLOGY

## 2019-08-22 PROCEDURE — 84156 ASSAY OF PROTEIN URINE: CPT | Performed by: OBSTETRICS & GYNECOLOGY

## 2019-08-22 PROCEDURE — 86900 BLOOD TYPING SEROLOGIC ABO: CPT | Performed by: OBSTETRICS & GYNECOLOGY

## 2019-08-22 PROCEDURE — 86901 BLOOD TYPING SEROLOGIC RH(D): CPT | Performed by: OBSTETRICS & GYNECOLOGY

## 2019-08-22 PROCEDURE — 59200 INSERT CERVICAL DILATOR: CPT | Performed by: OBSTETRICS & GYNECOLOGY

## 2019-08-22 PROCEDURE — 25010000002 BUTORPHANOL PER 1 MG

## 2019-08-22 PROCEDURE — 82570 ASSAY OF URINE CREATININE: CPT | Performed by: OBSTETRICS & GYNECOLOGY

## 2019-08-22 RX ORDER — SODIUM CHLORIDE 0.9 % (FLUSH) 0.9 %
3 SYRINGE (ML) INJECTION EVERY 12 HOURS SCHEDULED
Status: CANCELLED | OUTPATIENT
Start: 2019-08-22

## 2019-08-22 RX ORDER — OXYTOCIN 10 [USP'U]/ML
85 INJECTION, SOLUTION INTRAMUSCULAR; INTRAVENOUS ONCE
Status: CANCELLED | OUTPATIENT
Start: 2019-08-22

## 2019-08-22 RX ORDER — DEXTROSE, SODIUM CHLORIDE, SODIUM LACTATE, POTASSIUM CHLORIDE, AND CALCIUM CHLORIDE 5; .6; .31; .03; .02 G/100ML; G/100ML; G/100ML; G/100ML; G/100ML
125 INJECTION, SOLUTION INTRAVENOUS CONTINUOUS
Status: DISCONTINUED | OUTPATIENT
Start: 2019-08-22 | End: 2019-08-22

## 2019-08-22 RX ORDER — SODIUM CHLORIDE, SODIUM LACTATE, POTASSIUM CHLORIDE, CALCIUM CHLORIDE 600; 310; 30; 20 MG/100ML; MG/100ML; MG/100ML; MG/100ML
125 INJECTION, SOLUTION INTRAVENOUS CONTINUOUS
Status: DISCONTINUED | OUTPATIENT
Start: 2019-08-22 | End: 2019-08-24

## 2019-08-22 RX ORDER — ONDANSETRON 2 MG/ML
4 INJECTION INTRAMUSCULAR; INTRAVENOUS EVERY 6 HOURS PRN
Status: CANCELLED | OUTPATIENT
Start: 2019-08-22

## 2019-08-22 RX ORDER — OXYTOCIN/0.9 % SODIUM CHLORIDE 30/500 ML
PLASTIC BAG, INJECTION (ML) INTRAVENOUS
Status: COMPLETED
Start: 2019-08-22 | End: 2019-08-23

## 2019-08-22 RX ORDER — METHYLERGONOVINE MALEATE 0.2 MG/ML
200 INJECTION INTRAVENOUS ONCE AS NEEDED
Status: CANCELLED | OUTPATIENT
Start: 2019-08-22

## 2019-08-22 RX ORDER — MISOPROSTOL 100 UG/1
25 TABLET ORAL ONCE
Status: CANCELLED | OUTPATIENT
Start: 2019-08-22 | End: 2019-08-22

## 2019-08-22 RX ORDER — DEXTROSE, SODIUM CHLORIDE, SODIUM LACTATE, POTASSIUM CHLORIDE, AND CALCIUM CHLORIDE 5; .6; .31; .03; .02 G/100ML; G/100ML; G/100ML; G/100ML; G/100ML
INJECTION, SOLUTION INTRAVENOUS
Status: COMPLETED
Start: 2019-08-22 | End: 2019-08-22

## 2019-08-22 RX ORDER — ONDANSETRON 4 MG/1
4 TABLET, FILM COATED ORAL EVERY 6 HOURS PRN
Status: CANCELLED | OUTPATIENT
Start: 2019-08-22

## 2019-08-22 RX ORDER — LIDOCAINE HYDROCHLORIDE 10 MG/ML
5 INJECTION, SOLUTION EPIDURAL; INFILTRATION; INTRACAUDAL; PERINEURAL AS NEEDED
Status: DISCONTINUED | OUTPATIENT
Start: 2019-08-22 | End: 2019-08-23 | Stop reason: HOSPADM

## 2019-08-22 RX ORDER — ONDANSETRON 4 MG/1
4 TABLET, FILM COATED ORAL EVERY 6 HOURS PRN
Status: DISCONTINUED | OUTPATIENT
Start: 2019-08-22 | End: 2019-08-23 | Stop reason: HOSPADM

## 2019-08-22 RX ORDER — SODIUM CHLORIDE 0.9 % (FLUSH) 0.9 %
3-10 SYRINGE (ML) INJECTION AS NEEDED
Status: DISCONTINUED | OUTPATIENT
Start: 2019-08-22 | End: 2019-08-23 | Stop reason: HOSPADM

## 2019-08-22 RX ORDER — SODIUM CHLORIDE 0.9 % (FLUSH) 0.9 %
3-10 SYRINGE (ML) INJECTION AS NEEDED
Status: CANCELLED | OUTPATIENT
Start: 2019-08-22

## 2019-08-22 RX ORDER — MISOPROSTOL 100 MCG
25 TABLET ORAL ONCE
Status: COMPLETED | OUTPATIENT
Start: 2019-08-22 | End: 2019-08-22

## 2019-08-22 RX ORDER — DEXTROSE, SODIUM CHLORIDE, SODIUM LACTATE, POTASSIUM CHLORIDE, AND CALCIUM CHLORIDE 5; .6; .31; .03; .02 G/100ML; G/100ML; G/100ML; G/100ML; G/100ML
125 INJECTION, SOLUTION INTRAVENOUS CONTINUOUS
Status: CANCELLED | OUTPATIENT
Start: 2019-08-22

## 2019-08-22 RX ORDER — CARBOPROST TROMETHAMINE 250 UG/ML
250 INJECTION, SOLUTION INTRAMUSCULAR AS NEEDED
Status: CANCELLED | OUTPATIENT
Start: 2019-08-22

## 2019-08-22 RX ORDER — MISOPROSTOL 100 UG/1
800 TABLET ORAL AS NEEDED
Status: CANCELLED | OUTPATIENT
Start: 2019-08-22

## 2019-08-22 RX ORDER — ONDANSETRON 2 MG/ML
INJECTION INTRAMUSCULAR; INTRAVENOUS
Status: COMPLETED
Start: 2019-08-22 | End: 2019-08-22

## 2019-08-22 RX ORDER — ONDANSETRON 2 MG/ML
4 INJECTION INTRAMUSCULAR; INTRAVENOUS EVERY 6 HOURS PRN
Status: DISCONTINUED | OUTPATIENT
Start: 2019-08-22 | End: 2019-08-23 | Stop reason: HOSPADM

## 2019-08-22 RX ORDER — OXYTOCIN/0.9 % SODIUM CHLORIDE 30/500 ML
2 PLASTIC BAG, INJECTION (ML) INTRAVENOUS
Status: DISCONTINUED | OUTPATIENT
Start: 2019-08-22 | End: 2019-08-23 | Stop reason: SDUPTHER

## 2019-08-22 RX ORDER — OXYTOCIN 10 [USP'U]/ML
650 INJECTION, SOLUTION INTRAMUSCULAR; INTRAVENOUS ONCE
Status: CANCELLED | OUTPATIENT
Start: 2019-08-22

## 2019-08-22 RX ORDER — MISOPROSTOL 100 MCG
TABLET ORAL
Status: COMPLETED
Start: 2019-08-22 | End: 2019-08-22

## 2019-08-22 RX ORDER — LIDOCAINE HYDROCHLORIDE 10 MG/ML
5 INJECTION, SOLUTION EPIDURAL; INFILTRATION; INTRACAUDAL; PERINEURAL AS NEEDED
Status: CANCELLED | OUTPATIENT
Start: 2019-08-22

## 2019-08-22 RX ORDER — SODIUM CHLORIDE 0.9 % (FLUSH) 0.9 %
3 SYRINGE (ML) INJECTION EVERY 12 HOURS SCHEDULED
Status: DISCONTINUED | OUTPATIENT
Start: 2019-08-22 | End: 2019-08-23 | Stop reason: HOSPADM

## 2019-08-22 RX ADMIN — ONDANSETRON 4 MG: 2 INJECTION INTRAMUSCULAR; INTRAVENOUS at 23:00

## 2019-08-22 RX ADMIN — BUTORPHANOL TARTRATE 2 MG: 2 INJECTION, SOLUTION INTRAMUSCULAR; INTRAVENOUS at 23:20

## 2019-08-22 RX ADMIN — Medication 2 MG: at 23:20

## 2019-08-22 RX ADMIN — DEXTROSE, SODIUM CHLORIDE, SODIUM LACTATE, POTASSIUM CHLORIDE, AND CALCIUM CHLORIDE 125 ML/HR: 5; .6; .31; .03; .02 INJECTION, SOLUTION INTRAVENOUS at 16:18

## 2019-08-22 RX ADMIN — Medication 25 MCG: at 17:29

## 2019-08-22 RX ADMIN — MISOPROSTOL 25 MCG: 100 TABLET ORAL at 17:29

## 2019-08-22 RX ADMIN — SODIUM CHLORIDE, SODIUM LACTATE, POTASSIUM CHLORIDE, CALCIUM CHLORIDE AND DEXTROSE MONOHYDRATE 125 ML/HR: 5; 600; 310; 30; 20 INJECTION, SOLUTION INTRAVENOUS at 16:18

## 2019-08-22 RX ADMIN — SODIUM CHLORIDE, POTASSIUM CHLORIDE, SODIUM LACTATE AND CALCIUM CHLORIDE 125 ML/HR: 600; 310; 30; 20 INJECTION, SOLUTION INTRAVENOUS at 23:01

## 2019-08-22 NOTE — H&P (VIEW-ONLY)
"HISTORY & PHYSICAL - Obstetrics  [facility]    Name: Kristal Moreno  MRN: [id number]  Date: 2019  CSN: [id number]      CHIEF COMPLAINT:  Elevated BP at home    HISTORY OF PRESENT ILLNESS  Kristal Moreno is a 24 y.o.  at 37w5d gestational age by LMP = 1TUS suggesting Estimated Date of Delivery: 19.  The patient presents today for an OB visit.  BP today was 138/91.  She reports that she had a \"very elevated BP\" at a dentist appointment and was monitored on L&D.  She had 1 mild range BP during that visit (128/95) but no others and BP since then has been normotensive.  Today, she denies any HA, vision changes, or RUQ pain.  She does report increased swelling of her hands, feet, and face.  Denies LOF, vaginal bleeding, or regular contractions.  Reports good FM.    Patient denies any chest pain, palpitations, headaches, lightheadedness, shortness of breath, cough, nausea, vomiting, diarrhea, constipation, fever, or chills.    ROS as above and otherwise negative    PRENATAL LAB RESULTS  Prenatal labs reviewed  External Prenatal Results     Pregnancy Outside Results - Transcribed From Office Records - See Scanned Records For Details     Test Value Date Time    Hgb 10.9 g/dL 19 1440    Hct 32.5 % 19 1440    ABO A  19 0950    Rh Positive  19 0950    Antibody Screen Negative  19 0950    Glucose Fasting GTT       Glucose Tolerance Test 1 hour       Glucose Tolerance Test 3 hour       Gonorrhea (discrete) Negative  19 0950    Chlamydia (discrete) Negative  19 0950    RPR Non-Reactive  19 0950    VDRL       Syphilis Antibody       Rubella 145.0 IU/mL 19 0950      Immune  19 0950    HBsAg Negative  19 0950    Herpes Simplex Virus PCR       Herpes Simplex VIrus Culture       HIV Negative  19 0950    Hep C RNA Quant PCR       Hep C Antibody Negative  19 0950    AFP       Group B Strep Negative  " 19 1104    GBS Susceptibility to Clindamycin       GBS Susceptibility to Erythromycin       Fetal Fibronectin Negative  19 1113    Genetic Testing, Maternal Blood             Drug Screening     Test Value Date Time    Urine Drug Screen       Amphetamine Screen Negative  19 0950    Barbiturate Screen Negative  19 0950    Benzodiazepine Screen Negative  19 0950    Methadone Screen Negative  19 0950    Phencyclidine Screen       Opiates Screen Negative  19 0950    THC Screen Negative  19 0950    Cocaine Screen       Propoxyphene Screen       Buprenorphine Screen       Methamphetamine Screen       Oxycodone Screen Negative  19 0950    Tricyclic Antidepressants Screen                   PRENATAL RISK FACTORS   Problems (from 19 to present)     Problem Noted Resolved    Supervision of normal pregnancy 2019 by Haylee Meredith MD No    Overview Signed 2019 12:00 PM by Haylee Meredith MD     A pos / Rubella immune / GBS neg  Dating: LMP = 1TUS (6wk)  Genetics: Declines  Tdap:   Flu: N/A  Anatomy: WNL  1h Glucola: 98  H&H/Plts:   Lab Results   Component Value Date    HGB 10.9 (L) 2019    HCT 32.5 (L) 2019     2019   Breast/BC undecided         HSV-1 (herpes simplex virus 1) infection 2019 by Ethan Lacy DO No    Overview Signed 2019 11:57 AM by Haylee Meredith MD     Valtrex 500mg daily starting at 36wks               DATING SUMMARY  LMP (2018) -- TYSON 2019  1TUS (2019 at 6w1d) -- TYSON 9/3/2019    OB HISTORY  OB History    Para Term  AB Living   2 0 0 0 1 0   SAB TAB Ectopic Molar Multiple Live Births   1 0 0   0        # Outcome Date GA Lbr Yonny/2nd Weight Sex Delivery Anes PTL Lv   2 Current            1 2017                GYN HISTORY  Denies h/o sexually transmitted infections/pelvic inflammatory disease  Denies h/o abnormal pap  smears  Denies h/o gynecologic surgeries, including biopsies of the cervix    PAST MEDICAL HISTORY  Past Medical History:   Diagnosis Date   • Allergic rhinitis    • Fibrocystic disease of breast    • HSV-1 infection     cold sores only   • Unsocialized conduct disorder     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; sent for evaluation        PAST SURGICAL HISTORY  Past Surgical History:   Procedure Laterality Date   • ADENOIDECTOMY     • TONSILLECTOMY       FAMILY HISTORY  Family History   Problem Relation Age of Onset   • Breast cancer Other    • Lung cancer Other    • Stroke Other    • No Known Problems Mother    • No Known Problems Paternal Grandmother    • Lung cancer Maternal Grandmother    • Cancer Maternal Grandfather    • Hypotension Maternal Grandfather    • No Known Problems Brother    • No Known Problems Brother    • No Known Problems Brother    • No Known Problems Sister      SOCIAL HISTORY  Social History     Socioeconomic History   • Marital status: Single     Spouse name: Not on file   • Number of children: Not on file   • Years of education: Not on file   • Highest education level: Not on file   Tobacco Use   • Smoking status: Former Smoker     Packs/day: 0.25     Years: 5.00     Pack years: 1.25     Types: Cigarettes     Last attempt to quit: 3/1/2019     Years since quittin.4   • Smokeless tobacco: Never Used   • Tobacco comment: nothing current during pregnancy   Substance and Sexual Activity   • Alcohol use: No   • Drug use: No   • Sexual activity: Yes     Partners: Male     Birth control/protection: None     ALLERGIES  No Known Allergies  HOME MEDICATIONS  Prior to Admission medications    Medication Sig Start Date End Date Taking? Authorizing Provider   Prenatal Vit-Min-FA-Fish Oil ([employer] PRENATAL GUMMY) 0.4-113.5 MG chewable tablet Chew 1 dose Daily. Or any other prenatal (chewable) covered by insurance 3/7/19 3/7/20 Yes Roula Vega CNM   valACYclovir  (VALTREX) 500 MG tablet Take 500 mg by mouth Daily. 19  Yes Provider, MD Gonzales   clindamycin (CLINDAGEL) 1 % gel Apply  topically to the appropriate area as directed 2 (Two) Times a Day. 19   Юлия Moore APRN   ondansetron (ZOFRAN) 4 MG tablet Take 1 tablet by mouth Every 8 (Eight) Hours As Needed for Nausea or Vomiting. 19  Abida Archibald APRN   promethazine (PHENERGAN) 12.5 MG tablet Take 1 tablet by mouth Every 6 (Six) Hours As Needed for Nausea or Vomiting. 3/7/19   Roula Vega CNM     PHYSICAL EXAM  /91   Wt 83.9 kg (185 lb)   LMP 2018 (Exact Date)   BMI 29.86 kg/m²   General: No acute distress. Well developed, well nourished. Pleasant.  Heart: Regular rate and rhythm. No murmurs, rubs, or gallops  Lungs: Clear to auscultation bilaterally. No wheezes, rales, or rhonchi.  Abdomen: Soft, nontender to palpation, enlarged by gravid uterus.    SVE: 1/ 50/ -3/ midconsistency/ posterior     Beside Ultrasound:  Presenting part: cephalic  Leopold's: 6.5#    IMPRESSION  Kristal Moreno is a 24 y.o.  at 37w5d admitted with GHTN, possible preeclampsia.  Will obtain labs to rule out preeclampsia and proceed with induction of labor with cervical ripening.    PLAN  1.  IUP at 37w5d with GHTN  - Admit: Labor and Delivery  - Attending: Dr. Meredith  - Diagnosis: GHTN  - Condition: Stable  - Vitals: per protocol, at least q4h  - Activity: ad gema  - Nursing: Continuous electronic fetal monitoring, as per protocol  - Diet: NPO except ice/clears  - IV fluids:  mL/hr  - Meds: Cytotec 25mcg PV x1, then likely Cook catheter balloon  - Allergies: NKDA  - Labs: CBC, T&S, UDS, CMP, P/Cr, UA  - GBS: negative.  Antibiotics: not indicated  - Patient is unsure regarding an epidural  - Anticipate     2.  HSV1  - No genital lesions  - No recent outbreaks  - No evidence of lesions      This document has been electronically signed by Haylee Meredith,  MD on August 22, 2019 11:59 AM.

## 2019-08-22 NOTE — PROGRESS NOTES
CC: Prenatal visit    37w5d. Doing well.  Denies regular contractions, LOF, or VB. Reports good FM.  Reports increased swelling of her legs, face, and hands.    /91   Wt 83.9 kg (185 lb)   LMP 2018 (Exact Date)   BMI 29.86 kg/m²   SVE: /-3/midconsistency/posterior  FH: 35cm  FHT: 150bpm     Problems (from 19 to present)     Problem Noted Resolved    Supervision of normal pregnancy 2019 by Haylee Meredith MD No    Overview Signed 2019 12:00 PM by Haylee Meredith MD     A pos / Rubella immune / GBS neg  Dating: LMP = 1TUS (6wk)  Genetics: Declines  Tdap:   Flu: N/A  Anatomy: WNL  1h Glucola: 98  H&H/Plts:   Lab Results   Component Value Date    HGB 10.9 (L) 2019    HCT 32.5 (L) 2019     2019   Breast/BC undecided         HSV-1 (herpes simplex virus 1) infection 2019 by Ethan Lacy DO No    Overview Signed 2019 11:57 AM by Haylee Meredith MD     Valtrex 500mg daily starting at 36wks             A/P: Kristal Moreno is a 24 y.o.  at 37w5d with GHTN, possible preE.  Mild range BP today.  Upon review of BP, she had another mild range BP several weeks ago during a triage visit for HTN at a dentist visit.  Therefore, will proceed with IOL.  Will need cervical ripening.  She would like to go home and pack a bag.  I recommended that she go straight to L&D but since she is asymptomatic otherwise, she could go home to get her bags.   I advised her of the potential complications including potential seizure or fetal demise.  Patient desires to go home and come back this afternoon.  L&D notified.      This document has been electronically signed by Haylee Meredith MD on 2019 11:46 AM.

## 2019-08-22 NOTE — INTERVAL H&P NOTE
"H&P reviewed. The patient was examined and there are no changes to the H&P.     Patient doing well.  Denies HA, vision changes, or RUQ pain.  Reports some contractions but denies LOF or VB.    /80   Pulse 91   Temp 99.1 °F (37.3 °C) (Oral)   Ht 164.1 cm (64.6\")   Wt 83.1 kg (183 lb 4.8 oz)   LMP 2018 (Exact Date)   SpO2 98%   Breastfeeding? Yes   BMI 30.88 kg/m²     SVE: /-5/firm/posterior  Misoprostol 25mcg PV placed at 5:30PM     2019 16:14   WBC 21.28 (H)   RBC 3.73 (L)   Hemoglobin 10.1 (L)   Hematocrit 29.5 (L)   RDW 13.2   MCV 79.1   MCH 27.1   MCHC 34.2   MPV 11.6   Platelets 277   RDW-SD 37.7      2019 16:14   Glucose 101 (H)   Sodium 139   Potassium 3.2 (L)   CO2 19.0 (L)   Chloride 105   Anion Gap 15.0   Creatinine 0.63   BUN 5 (L)   BUN/Creatinine Ratio 7.9   Calcium 9.1   eGFR Non African Am 118   Alkaline Phosphatase 200 (H)   Total Protein 6.9   ALT (SGPT) 7   AST (SGOT) 16   Total Bilirubin 0.3   Albumin 3.20 (L)   Globulin 3.7   A/G Ratio 0.9      2019 16:14   Color, UA Yellow   Appearance, UA Clear   Specific Gravity, UA 1.007   pH, UA 6.5   Glucose Negative   Ketones, UA Negative   Blood, UA Large (3+) (A)   Nitrite, UA Negative   Leukocytes, UA Moderate (2+) (A)   Protein, UA Negative   Bilirubin, UA Negative   Urobilinogen, UA 1.0 E.U./dL   RBC, UA 0-2 (A)   WBC, UA 3-5   Bacteria, UA None Seen   Squamous Epithelial Cells, UA 3-5 (A)   Hyaline Casts, UA 0-2     UDS negative    P/Cr pending    A/P: Kristal Moreno is a 24 y.o.  at 37w5d with GHTN.  No signs of preeclampsia at this time.  IOL began with cervical ripening.  Will recheck in 4h and determine if additional cervical ripening indicated vs mechanical dilation with Cook catheter balloon.  Cat 1 FHT.  May have a meal, then clear liquids.  I discussed induction process and need for medications and likely pitocin given her indication for delivery.  Patient and family voice " understanding.

## 2019-08-22 NOTE — H&P
"HISTORY & PHYSICAL - Obstetrics  [facility]    Name: Kristal Moreno  MRN: [id number]  Date: 2019  CSN: [id number]      CHIEF COMPLAINT:  Elevated BP at home    HISTORY OF PRESENT ILLNESS  Kristal Moreno is a 24 y.o.  at 37w5d gestational age by LMP = 1TUS suggesting Estimated Date of Delivery: 19.  The patient presents today for an OB visit.  BP today was 138/91.  She reports that she had a \"very elevated BP\" at a dentist appointment and was monitored on L&D.  She had 1 mild range BP during that visit (128/95) but no others and BP since then has been normotensive.  Today, she denies any HA, vision changes, or RUQ pain.  She does report increased swelling of her hands, feet, and face.  Denies LOF, vaginal bleeding, or regular contractions.  Reports good FM.    Patient denies any chest pain, palpitations, headaches, lightheadedness, shortness of breath, cough, nausea, vomiting, diarrhea, constipation, fever, or chills.    ROS as above and otherwise negative    PRENATAL LAB RESULTS  Prenatal labs reviewed  External Prenatal Results     Pregnancy Outside Results - Transcribed From Office Records - See Scanned Records For Details     Test Value Date Time    Hgb 10.9 g/dL 19 1440    Hct 32.5 % 19 1440    ABO A  19 0950    Rh Positive  19 0950    Antibody Screen Negative  19 0950    Glucose Fasting GTT       Glucose Tolerance Test 1 hour       Glucose Tolerance Test 3 hour       Gonorrhea (discrete) Negative  19 0950    Chlamydia (discrete) Negative  19 0950    RPR Non-Reactive  19 0950    VDRL       Syphilis Antibody       Rubella 145.0 IU/mL 19 0950      Immune  19 0950    HBsAg Negative  19 0950    Herpes Simplex Virus PCR       Herpes Simplex VIrus Culture       HIV Negative  19 0950    Hep C RNA Quant PCR       Hep C Antibody Negative  19 0950    AFP       Group B Strep Negative  " 19 1104    GBS Susceptibility to Clindamycin       GBS Susceptibility to Erythromycin       Fetal Fibronectin Negative  19 1113    Genetic Testing, Maternal Blood             Drug Screening     Test Value Date Time    Urine Drug Screen       Amphetamine Screen Negative  19 0950    Barbiturate Screen Negative  19 0950    Benzodiazepine Screen Negative  19 0950    Methadone Screen Negative  19 0950    Phencyclidine Screen       Opiates Screen Negative  19 0950    THC Screen Negative  19 0950    Cocaine Screen       Propoxyphene Screen       Buprenorphine Screen       Methamphetamine Screen       Oxycodone Screen Negative  19 0950    Tricyclic Antidepressants Screen                   PRENATAL RISK FACTORS   Problems (from 19 to present)     Problem Noted Resolved    Supervision of normal pregnancy 2019 by Haylee Meredith MD No    Overview Signed 2019 12:00 PM by Haylee Meredith MD     A pos / Rubella immune / GBS neg  Dating: LMP = 1TUS (6wk)  Genetics: Declines  Tdap:   Flu: N/A  Anatomy: WNL  1h Glucola: 98  H&H/Plts:   Lab Results   Component Value Date    HGB 10.9 (L) 2019    HCT 32.5 (L) 2019     2019   Breast/BC undecided         HSV-1 (herpes simplex virus 1) infection 2019 by Ethan Lacy DO No    Overview Signed 2019 11:57 AM by Haylee Meredith MD     Valtrex 500mg daily starting at 36wks               DATING SUMMARY  LMP (2018) -- TYSON 2019  1TUS (2019 at 6w1d) -- TYSON 9/3/2019    OB HISTORY  OB History    Para Term  AB Living   2 0 0 0 1 0   SAB TAB Ectopic Molar Multiple Live Births   1 0 0   0        # Outcome Date GA Lbr Yonny/2nd Weight Sex Delivery Anes PTL Lv   2 Current            1 2017                GYN HISTORY  Denies h/o sexually transmitted infections/pelvic inflammatory disease  Denies h/o abnormal pap  smears  Denies h/o gynecologic surgeries, including biopsies of the cervix    PAST MEDICAL HISTORY  Past Medical History:   Diagnosis Date   • Allergic rhinitis    • Fibrocystic disease of breast    • HSV-1 infection     cold sores only   • Unsocialized conduct disorder     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; sent for evaluation        PAST SURGICAL HISTORY  Past Surgical History:   Procedure Laterality Date   • ADENOIDECTOMY     • TONSILLECTOMY       FAMILY HISTORY  Family History   Problem Relation Age of Onset   • Breast cancer Other    • Lung cancer Other    • Stroke Other    • No Known Problems Mother    • No Known Problems Paternal Grandmother    • Lung cancer Maternal Grandmother    • Cancer Maternal Grandfather    • Hypotension Maternal Grandfather    • No Known Problems Brother    • No Known Problems Brother    • No Known Problems Brother    • No Known Problems Sister      SOCIAL HISTORY  Social History     Socioeconomic History   • Marital status: Single     Spouse name: Not on file   • Number of children: Not on file   • Years of education: Not on file   • Highest education level: Not on file   Tobacco Use   • Smoking status: Former Smoker     Packs/day: 0.25     Years: 5.00     Pack years: 1.25     Types: Cigarettes     Last attempt to quit: 3/1/2019     Years since quittin.4   • Smokeless tobacco: Never Used   • Tobacco comment: nothing current during pregnancy   Substance and Sexual Activity   • Alcohol use: No   • Drug use: No   • Sexual activity: Yes     Partners: Male     Birth control/protection: None     ALLERGIES  No Known Allergies  HOME MEDICATIONS  Prior to Admission medications    Medication Sig Start Date End Date Taking? Authorizing Provider   Prenatal Vit-Min-FA-Fish Oil ([facility] PRENATAL GUMMY) 0.4-113.5 MG chewable tablet Chew 1 dose Daily. Or any other prenatal (chewable) covered by insurance 3/7/19 3/7/20 Yes Roula Vega CNM   valACYclovir  (VALTREX) 500 MG tablet Take 500 mg by mouth Daily. 19  Yes Provider, MD Gonzales   clindamycin (CLINDAGEL) 1 % gel Apply  topically to the appropriate area as directed 2 (Two) Times a Day. 19   Юлия Moore APRN   ondansetron (ZOFRAN) 4 MG tablet Take 1 tablet by mouth Every 8 (Eight) Hours As Needed for Nausea or Vomiting. 19  Abida Archibald APRN   promethazine (PHENERGAN) 12.5 MG tablet Take 1 tablet by mouth Every 6 (Six) Hours As Needed for Nausea or Vomiting. 3/7/19   Roula Vega CNM     PHYSICAL EXAM  /91   Wt 83.9 kg (185 lb)   LMP 2018 (Exact Date)   BMI 29.86 kg/m²   General: No acute distress. Well developed, well nourished. Pleasant.  Heart: Regular rate and rhythm. No murmurs, rubs, or gallops  Lungs: Clear to auscultation bilaterally. No wheezes, rales, or rhonchi.  Abdomen: Soft, nontender to palpation, enlarged by gravid uterus.    SVE: 1/ 50/ -3/ midconsistency/ posterior     Beside Ultrasound:  Presenting part: cephalic  Leopold's: 6.5#    IMPRESSION  Kristal Moreno is a 24 y.o.  at 37w5d admitted with GHTN, possible preeclampsia.  Will obtain labs to rule out preeclampsia and proceed with induction of labor with cervical ripening.    PLAN  1.  IUP at 37w5d with GHTN  - Admit: Labor and Delivery  - Attending: Dr. Meredith  - Diagnosis: GHTN  - Condition: Stable  - Vitals: per protocol, at least q4h  - Activity: ad gema  - Nursing: Continuous electronic fetal monitoring, as per protocol  - Diet: NPO except ice/clears  - IV fluids:  mL/hr  - Meds: Cytotec 25mcg PV x1, then likely Cook catheter balloon  - Allergies: NKDA  - Labs: CBC, T&S, UDS, CMP, P/Cr, UA  - GBS: negative.  Antibiotics: not indicated  - Patient is unsure regarding an epidural  - Anticipate     2.  HSV1  - No genital lesions  - No recent outbreaks  - No evidence of lesions      This document has been electronically signed by Haylee Meredith,  MD on August 22, 2019 11:59 AM.

## 2019-08-23 ENCOUNTER — ANESTHESIA EVENT (OUTPATIENT)
Dept: LABOR AND DELIVERY | Facility: HOSPITAL | Age: 24
End: 2019-08-23

## 2019-08-23 ENCOUNTER — ANESTHESIA (OUTPATIENT)
Dept: LABOR AND DELIVERY | Facility: HOSPITAL | Age: 24
End: 2019-08-23

## 2019-08-23 LAB
CREAT UR-MCNC: 52.1 MG/DL
PROT UR-MCNC: 15 MG/DL
PROT/CREAT UR: 287.9 MG/G CREA (ref 0–200)

## 2019-08-23 PROCEDURE — C1755 CATHETER, INTRASPINAL: HCPCS

## 2019-08-23 PROCEDURE — 25010000002 PROMETHAZINE PER 50 MG

## 2019-08-23 PROCEDURE — 59410 OBSTETRICAL CARE: CPT | Performed by: OBSTETRICS & GYNECOLOGY

## 2019-08-23 PROCEDURE — 4A1HX4Z MONITORING OF PRODUCTS OF CONCEPTION, CARDIAC ELECTRICAL ACTIVITY, EXTERNAL APPROACH: ICD-10-PCS | Performed by: OBSTETRICS & GYNECOLOGY

## 2019-08-23 PROCEDURE — 0UQMXZZ REPAIR VULVA, EXTERNAL APPROACH: ICD-10-PCS | Performed by: OBSTETRICS & GYNECOLOGY

## 2019-08-23 PROCEDURE — C1755 CATHETER, INTRASPINAL: HCPCS | Performed by: NURSE ANESTHETIST, CERTIFIED REGISTERED

## 2019-08-23 PROCEDURE — 88307 TISSUE EXAM BY PATHOLOGIST: CPT | Performed by: PATHOLOGY

## 2019-08-23 PROCEDURE — 88307 TISSUE EXAM BY PATHOLOGIST: CPT | Performed by: OBSTETRICS & GYNECOLOGY

## 2019-08-23 PROCEDURE — 25010000002 BUTORPHANOL PER 1 MG

## 2019-08-23 PROCEDURE — 25010000002 CEFAZOLIN PER 500 MG: Performed by: OBSTETRICS & GYNECOLOGY

## 2019-08-23 RX ORDER — ACETAMINOPHEN 500 MG
1000 TABLET ORAL EVERY 8 HOURS
Status: DISCONTINUED | OUTPATIENT
Start: 2019-08-23 | End: 2019-08-24 | Stop reason: HOSPADM

## 2019-08-23 RX ORDER — DOCUSATE SODIUM 100 MG/1
100 CAPSULE, LIQUID FILLED ORAL 2 TIMES DAILY
Status: DISCONTINUED | OUTPATIENT
Start: 2019-08-23 | End: 2019-08-24 | Stop reason: HOSPADM

## 2019-08-23 RX ORDER — PRENATAL VIT/IRON FUM/FOLIC AC 27MG-0.8MG
1 TABLET ORAL DAILY
Status: DISCONTINUED | OUTPATIENT
Start: 2019-08-23 | End: 2019-08-24 | Stop reason: HOSPADM

## 2019-08-23 RX ORDER — DIPHENHYDRAMINE HYDROCHLORIDE 50 MG/ML
12.5 INJECTION INTRAMUSCULAR; INTRAVENOUS EVERY 8 HOURS PRN
Status: DISCONTINUED | OUTPATIENT
Start: 2019-08-23 | End: 2019-08-23 | Stop reason: HOSPADM

## 2019-08-23 RX ORDER — OXYTOCIN/0.9 % SODIUM CHLORIDE 30/500 ML
650 PLASTIC BAG, INJECTION (ML) INTRAVENOUS ONCE
Status: DISCONTINUED | OUTPATIENT
Start: 2019-08-23 | End: 2019-08-24 | Stop reason: HOSPADM

## 2019-08-23 RX ORDER — LIDOCAINE HYDROCHLORIDE AND EPINEPHRINE 20; 5 MG/ML; UG/ML
INJECTION, SOLUTION EPIDURAL; INFILTRATION; INTRACAUDAL; PERINEURAL AS NEEDED
Status: DISCONTINUED | OUTPATIENT
Start: 2019-08-23 | End: 2019-08-23 | Stop reason: SURG

## 2019-08-23 RX ORDER — SODIUM CHLORIDE 0.9 % (FLUSH) 0.9 %
1-10 SYRINGE (ML) INJECTION AS NEEDED
Status: DISCONTINUED | OUTPATIENT
Start: 2019-08-23 | End: 2019-08-24 | Stop reason: HOSPADM

## 2019-08-23 RX ORDER — OXYTOCIN/0.9 % SODIUM CHLORIDE 30/500 ML
85 PLASTIC BAG, INJECTION (ML) INTRAVENOUS ONCE
Status: COMPLETED | OUTPATIENT
Start: 2019-08-23 | End: 2019-08-23

## 2019-08-23 RX ORDER — BUPIVACAINE HCL/0.9 % NACL/PF 0.1 %
2 PLASTIC BAG, INJECTION (ML) EPIDURAL ONCE
Status: COMPLETED | OUTPATIENT
Start: 2019-08-23 | End: 2019-08-23

## 2019-08-23 RX ORDER — ONDANSETRON 2 MG/ML
4 INJECTION INTRAMUSCULAR; INTRAVENOUS ONCE AS NEEDED
Status: DISCONTINUED | OUTPATIENT
Start: 2019-08-23 | End: 2019-08-23 | Stop reason: HOSPADM

## 2019-08-23 RX ORDER — METHYLERGONOVINE MALEATE 0.2 MG/ML
200 INJECTION INTRAVENOUS ONCE AS NEEDED
Status: DISCONTINUED | OUTPATIENT
Start: 2019-08-23 | End: 2019-08-23 | Stop reason: HOSPADM

## 2019-08-23 RX ORDER — ONDANSETRON 2 MG/ML
4 INJECTION INTRAMUSCULAR; INTRAVENOUS EVERY 6 HOURS PRN
Status: DISCONTINUED | OUTPATIENT
Start: 2019-08-23 | End: 2019-08-24 | Stop reason: HOSPADM

## 2019-08-23 RX ORDER — OXYTOCIN/0.9 % SODIUM CHLORIDE 30/500 ML
85 PLASTIC BAG, INJECTION (ML) INTRAVENOUS ONCE
Status: DISCONTINUED | OUTPATIENT
Start: 2019-08-23 | End: 2019-08-24 | Stop reason: HOSPADM

## 2019-08-23 RX ORDER — LANOLIN 100 %
OINTMENT (GRAM) TOPICAL
Status: DISCONTINUED | OUTPATIENT
Start: 2019-08-23 | End: 2019-08-24 | Stop reason: HOSPADM

## 2019-08-23 RX ORDER — PROMETHAZINE HYDROCHLORIDE 25 MG/ML
12.5 INJECTION, SOLUTION INTRAMUSCULAR; INTRAVENOUS EVERY 6 HOURS PRN
Status: DISCONTINUED | OUTPATIENT
Start: 2019-08-23 | End: 2019-08-24

## 2019-08-23 RX ORDER — BISACODYL 10 MG
10 SUPPOSITORY, RECTAL RECTAL DAILY PRN
Status: DISCONTINUED | OUTPATIENT
Start: 2019-08-24 | End: 2019-08-24 | Stop reason: HOSPADM

## 2019-08-23 RX ORDER — SODIUM CHLORIDE, SODIUM LACTATE, POTASSIUM CHLORIDE, CALCIUM CHLORIDE 600; 310; 30; 20 MG/100ML; MG/100ML; MG/100ML; MG/100ML
INJECTION, SOLUTION INTRAVENOUS
Status: COMPLETED
Start: 2019-08-23 | End: 2019-08-23

## 2019-08-23 RX ORDER — IBUPROFEN 800 MG/1
800 TABLET ORAL EVERY 8 HOURS
Status: DISCONTINUED | OUTPATIENT
Start: 2019-08-23 | End: 2019-08-24 | Stop reason: HOSPADM

## 2019-08-23 RX ORDER — EPHEDRINE SULFATE 50 MG/ML
5 INJECTION, SOLUTION INTRAVENOUS
Status: DISCONTINUED | OUTPATIENT
Start: 2019-08-23 | End: 2019-08-23 | Stop reason: HOSPADM

## 2019-08-23 RX ORDER — FAMOTIDINE 10 MG/ML
20 INJECTION, SOLUTION INTRAVENOUS ONCE AS NEEDED
Status: DISCONTINUED | OUTPATIENT
Start: 2019-08-23 | End: 2019-08-23 | Stop reason: HOSPADM

## 2019-08-23 RX ORDER — OXYTOCIN/0.9 % SODIUM CHLORIDE 30/500 ML
PLASTIC BAG, INJECTION (ML) INTRAVENOUS
Status: DISPENSED
Start: 2019-08-23 | End: 2019-08-24

## 2019-08-23 RX ORDER — OXYTOCIN/0.9 % SODIUM CHLORIDE 30/500 ML
650 PLASTIC BAG, INJECTION (ML) INTRAVENOUS ONCE
Status: COMPLETED | OUTPATIENT
Start: 2019-08-23 | End: 2019-08-23

## 2019-08-23 RX ORDER — PROMETHAZINE HYDROCHLORIDE 25 MG/ML
INJECTION, SOLUTION INTRAMUSCULAR; INTRAVENOUS
Status: COMPLETED
Start: 2019-08-23 | End: 2019-08-23

## 2019-08-23 RX ORDER — CARBOPROST TROMETHAMINE 250 UG/ML
250 INJECTION, SOLUTION INTRAMUSCULAR AS NEEDED
Status: DISCONTINUED | OUTPATIENT
Start: 2019-08-23 | End: 2019-08-23 | Stop reason: HOSPADM

## 2019-08-23 RX ORDER — MISOPROSTOL 200 UG/1
800 TABLET ORAL AS NEEDED
Status: DISCONTINUED | OUTPATIENT
Start: 2019-08-23 | End: 2019-08-23 | Stop reason: HOSPADM

## 2019-08-23 RX ORDER — ONDANSETRON 4 MG/1
4 TABLET, FILM COATED ORAL EVERY 6 HOURS PRN
Status: DISCONTINUED | OUTPATIENT
Start: 2019-08-23 | End: 2019-08-24 | Stop reason: HOSPADM

## 2019-08-23 RX ADMIN — Medication 2 MG: at 08:18

## 2019-08-23 RX ADMIN — BUTORPHANOL TARTRATE 2 MG: 2 INJECTION, SOLUTION INTRAMUSCULAR; INTRAVENOUS at 03:02

## 2019-08-23 RX ADMIN — BENZOCAINE AND LEVOMENTHOL: 200; 5 SPRAY TOPICAL at 12:09

## 2019-08-23 RX ADMIN — SODIUM CHLORIDE, POTASSIUM CHLORIDE, SODIUM LACTATE AND CALCIUM CHLORIDE 125 ML/HR: 600; 310; 30; 20 INJECTION, SOLUTION INTRAVENOUS at 09:00

## 2019-08-23 RX ADMIN — PROMETHAZINE HYDROCHLORIDE 12.5 MG: 25 INJECTION, SOLUTION INTRAMUSCULAR; INTRAVENOUS at 08:33

## 2019-08-23 RX ADMIN — Medication 2 MG: at 03:02

## 2019-08-23 RX ADMIN — DOCUSATE SODIUM 100 MG: 100 CAPSULE, LIQUID FILLED ORAL at 20:23

## 2019-08-23 RX ADMIN — CEFAZOLIN SODIUM 2 G: 10 INJECTION, POWDER, FOR SOLUTION INTRAVENOUS at 10:41

## 2019-08-23 RX ADMIN — OXYTOCIN 650 ML/HR: 10 INJECTION INTRAVENOUS at 10:15

## 2019-08-23 RX ADMIN — Medication: at 14:10

## 2019-08-23 RX ADMIN — PRENATAL VIT W/ FE FUMARATE-FA TAB 27-0.8 MG 1 TABLET: 27-0.8 TAB at 14:09

## 2019-08-23 RX ADMIN — IBUPROFEN 800 MG: 800 TABLET ORAL at 14:10

## 2019-08-23 RX ADMIN — Medication 10 ML/HR: at 09:52

## 2019-08-23 RX ADMIN — IBUPROFEN 800 MG: 800 TABLET ORAL at 22:34

## 2019-08-23 RX ADMIN — BUTORPHANOL TARTRATE 2 MG: 2 INJECTION, SOLUTION INTRAMUSCULAR; INTRAVENOUS at 08:18

## 2019-08-23 RX ADMIN — Medication 650 ML/HR: at 10:15

## 2019-08-23 RX ADMIN — OXYTOCIN 85 ML/HR: 10 INJECTION INTRAVENOUS at 11:00

## 2019-08-23 RX ADMIN — PROMETHAZINE HYDROCHLORIDE 12.5 MG: 25 INJECTION INTRAMUSCULAR; INTRAVENOUS at 08:33

## 2019-08-23 RX ADMIN — ACETAMINOPHEN 1000 MG: 500 TABLET ORAL at 14:13

## 2019-08-23 RX ADMIN — LIDOCAINE HYDROCHLORIDE,EPINEPHRINE BITARTRATE 3 ML: 20; .005 INJECTION, SOLUTION EPIDURAL; INFILTRATION; INTRACAUDAL; PERINEURAL at 09:44

## 2019-08-23 RX ADMIN — ACETAMINOPHEN 1000 MG: 500 TABLET ORAL at 22:34

## 2019-08-23 NOTE — L&D DELIVERY NOTE
[facility]  Vaginal Delivery Note    Delivery     Delivery: Vaginal, Spontaneous     YOB: 2017    Time of Birth:  Gestational Age 10:03 AM   37w6d     Anesthesia: Epidural     Delivering clinician: Haylee Meredith    Forceps?   No   Vacuum? No    Shoulder dystocia present: No        Delivery narrative:     Patient pushed to a deliver a viable 3220g female from the BROOKLYN position, restituted to LOT, over an intact perineum via  under epidural anesthesia on 2019 at 10:03AM.  No noted nuchal cord.  Right anterior shoulder was delivered with ease, followed by left posterior shoulder.  Body was then delivered with gentle traction.  Mouth and nose bulb suctioned.  3 vessel cord clamped x2 and cut after 30 seconds of delayed clamping.  Baby was placed on mother's chest.  Cord blood obtained.  Placenta required manual extraction, which was achieved with 3 passes.  A final sweep of the uterus was performed to ensure no retained placenta.  Ancef IV 2g was administered.  250 mL estimated blood loss.  Cervix, vagina, and perineum were examined and bilateral periurethral lacerations were noted.  4-0 vicryl was used to repair the laceration in a routine fashion with good hemostasis.  APGARS 8/9.  Mother and infant stable.    Infant    Findings: female  infant     Infant observations: Weight: 3220 g (7 lb 1.6 oz)   Length: 20  in  Observations/Comments:         Apgars: 8   @ 1 minute /    9   @ 5 minutes   Infant Name: Melissa Howard     Placenta, Cord, and Fluid    Placenta delivered  Manual removal  at   2019 10:12 AM     Cord: 3 vessels  present.   Nuchal Cord?  no   Cord blood obtained: Yes    Cord gases obtained:  No    Cord gas results: Venous:  No results found for: PHCVEN    Arterial:  No results found for: PHCART     Repair    Episiotomy: None    Lacerations: Yes  Laceration Information  Laceration Repaired?   Perineal:         Periurethral: bilateral  Yes    Labial:          Sulcus:         Vaginal:         Cervical:           Suture used for repair: 3-0 Vicryl   Estimated Blood Loss: Est. Blood Loss (mL): 247 mL(Filed from Delivery Summary) (08/23/19 1003)           Complications  Hypertension (GHTN)    Disposition  Mother to Mother Baby/Postpartum  in stable condition currently.  Baby to NBN  in stable condition currently.      Haylee Meredith MD  08/23/19  5:12 PM

## 2019-08-23 NOTE — ANESTHESIA PROCEDURE NOTES
Labor Epidural      Start Time: 8/23/2019 9:37 AM  Stop Time: 8/23/2019 9:44 AM  Performed By  CRNA: Rory Guy CRNA  Preanesthetic Checklist  Completed: patient identified, site marked, surgical consent, pre-op evaluation, timeout performed, IV checked, risks and benefits discussed and monitors and equipment checked  Prep:  Pt Position:sitting  Sterile Tech:gloves, cap and sterile barrier  Monitoring:continuous pulse oximetry, EKG and blood pressure monitoring  Epidural Block Procedure:  Approach:midline  Guidance:palpation technique  Location:L3-L4  Needle Type:Tuohy  Needle Gauge:17 G  Loss of Resistance Medium: saline  Loss of Resistance: 7cm  Cath Depth at skin:14 cm  Paresthesia: none  Aspiration:negative  Test Dose:negative  Number of Attempts: 2  Post Assessment:  Dressing:secured with tape and occlusive dressing applied  Pt Tolerance:patient tolerated the procedure well with no apparent complications  Complications:no

## 2019-08-23 NOTE — PLAN OF CARE
Problem: Patient Care Overview  Goal: Plan of Care Review  Outcome: Ongoing (interventions implemented as appropriate)   08/22/19 6832   Coping/Psychosocial   Plan of Care Reviewed With patient;significant other     Goal: Individualization and Mutuality  Outcome: Ongoing (interventions implemented as appropriate)    Goal: Discharge Needs Assessment  Outcome: Ongoing (interventions implemented as appropriate)    Goal: Interprofessional Rounds/Family Conf  Outcome: Ongoing (interventions implemented as appropriate)      Problem: Labor (Cervical Ripen, Induct, Augment) (Adult,Obstetrics,Pediatric)  Goal: Signs and Symptoms of Listed Potential Problems Will be Absent, Minimized or Managed (Labor)  Outcome: Ongoing (interventions implemented as appropriate)

## 2019-08-23 NOTE — ANESTHESIA PREPROCEDURE EVALUATION
Anesthesia Evaluation     Patient summary reviewed and Nursing notes reviewed   NPO Solid Status: > 8 hours  NPO Liquid Status: > 8 hours           Airway   Mallampati: III  TM distance: >3 FB  Neck ROM: full  Possible difficult intubation  Dental - normal exam     Pulmonary - normal exam   Cardiovascular - normal exam    (+) hypertension,       Neuro/Psych  (+) psychiatric history,     GI/Hepatic/Renal/Endo      Musculoskeletal     Abdominal    Substance History      OB/GYN    (+) Pregnant,         Other                        Anesthesia Plan    ASA 2     epidural     Anesthetic plan, all risks, benefits, and alternatives have been provided, discussed and informed consent has been obtained with: patient.    Plan discussed with CRNA.

## 2019-08-23 NOTE — PROGRESS NOTES
"Labor Progress Note    Kristal Moreno, 24 y.o., seen and examined.  Patient reports contractions.  Denies HA, vision changes, or RUQ pain.    /81   Pulse 82   Temp 98 °F (36.7 °C) (Oral)   Resp 18   Ht 164.1 cm (64.6\")   Wt 83.1 kg (183 lb 4.8 oz)   LMP 2018 (Exact Date)   SpO2 98%   Breastfeeding? Yes   BMI 30.88 kg/m²   FHT: Baseline 130bpm / mod varability / + accelerations / no decelerations  TOCO: q3-5min  SVE: 1-2/70/-3/soft/posterior    Cook catheter placed without difficulty.  Both intrauterine and vaginal balloons inflated with 80cc sterile water.  Patient tolerated procedure well.    A/P: Kristal Moreno, 24 y.o.  at 37w5d admitted for IOL secondary to GHTN.  Responded well to cervical ripening.  Will proceed with mechanical dilation with balloon.  BP normal to mild range.  - s/p miso PV x1  - Cook in place.  Will hold off on pitocin at this point as she is tarun 3x/10min.  If she spaces out to 1-2 contractions/10 min, then will start low-dose pitocin at 2 mu/min and keep at that rate until balloon is out.  Balloon should be tugged on every 1h; deflate if still in place in 12h.  - FHT Category 1  - Declines epidural or pain medication  - Recheck following removal of balloon  "

## 2019-08-23 NOTE — ANESTHESIA POSTPROCEDURE EVALUATION
Patient: Kristal Moreno    Procedure Summary     Date:  08/23/19 Room / Location:      Anesthesia Start:  0917 Anesthesia Stop:  1029    Procedure:  LABOR ANALGESIA Diagnosis:      Scheduled Providers:   Provider:  Rory Guy CRNA    Anesthesia Type:  epidural ASA Status:  2          Anesthesia Type: epidural  Last vitals  BP   131/90 (08/23/19 1032)   Temp   98.2 °F (36.8 °C) (08/23/19 1016)   Pulse   85 (08/23/19 1032)   Resp   18 (08/23/19 1016)     SpO2   (!) 89 % (08/23/19 1004)     Post Anesthesia Care and Evaluation    Patient location during evaluation: bedside  Patient participation: complete - patient participated  Level of consciousness: awake and alert  Pain score: 0  Pain management: adequate  Airway patency: patent  Anesthetic complications: No anesthetic complications  PONV Status: none  Cardiovascular status: acceptable  Respiratory status: acceptable, spontaneous ventilation and unassisted  Hydration status: acceptable  Post Neuraxial Block status: Motor and sensory function returned to baseline and No signs or symptoms of PDPH

## 2019-08-24 VITALS
SYSTOLIC BLOOD PRESSURE: 129 MMHG | RESPIRATION RATE: 16 BRPM | DIASTOLIC BLOOD PRESSURE: 73 MMHG | HEART RATE: 80 BPM | TEMPERATURE: 98.3 F | WEIGHT: 183.3 LBS | OXYGEN SATURATION: 98 % | BODY MASS INDEX: 30.54 KG/M2 | HEIGHT: 65 IN

## 2019-08-24 PROCEDURE — 99024 POSTOP FOLLOW-UP VISIT: CPT | Performed by: OBSTETRICS & GYNECOLOGY

## 2019-08-24 RX ORDER — MEDROXYPROGESTERONE ACETATE 150 MG/ML
150 INJECTION, SUSPENSION INTRAMUSCULAR ONCE
Status: COMPLETED | OUTPATIENT
Start: 2019-08-24 | End: 2019-08-24

## 2019-08-24 RX ORDER — ACETAMINOPHEN 500 MG
1000 TABLET ORAL EVERY 8 HOURS
Qty: 30 TABLET | Refills: 0 | Status: SHIPPED | OUTPATIENT
Start: 2019-08-24 | End: 2019-09-12

## 2019-08-24 RX ORDER — OXYCODONE HYDROCHLORIDE 5 MG/1
5 TABLET ORAL ONCE
Status: COMPLETED | OUTPATIENT
Start: 2019-08-24 | End: 2019-08-24

## 2019-08-24 RX ORDER — IBUPROFEN 800 MG/1
800 TABLET ORAL EVERY 8 HOURS
Qty: 30 TABLET | Refills: 0 | Status: SHIPPED | OUTPATIENT
Start: 2019-08-24 | End: 2019-09-12

## 2019-08-24 RX ADMIN — PRENATAL VIT W/ FE FUMARATE-FA TAB 27-0.8 MG 1 TABLET: 27-0.8 TAB at 09:29

## 2019-08-24 RX ADMIN — Medication: at 12:14

## 2019-08-24 RX ADMIN — BENZOCAINE AND MENTHOL: 20; .5 SPRAY TOPICAL at 12:14

## 2019-08-24 RX ADMIN — OXYCODONE HYDROCHLORIDE 5 MG: 5 TABLET ORAL at 02:33

## 2019-08-24 RX ADMIN — ACETAMINOPHEN 1000 MG: 500 TABLET ORAL at 06:01

## 2019-08-24 RX ADMIN — DOCUSATE SODIUM 100 MG: 100 CAPSULE, LIQUID FILLED ORAL at 09:29

## 2019-08-24 RX ADMIN — IBUPROFEN 800 MG: 800 TABLET ORAL at 06:01

## 2019-08-24 RX ADMIN — MEDROXYPROGESTERONE ACETATE 150 MG: 150 INJECTION, SUSPENSION INTRAMUSCULAR at 12:57

## 2019-08-24 NOTE — PLAN OF CARE
Problem: Patient Care Overview  Goal: Plan of Care Review  Outcome: Ongoing (interventions implemented as appropriate)   19 5530   Coping/Psychosocial   Plan of Care Reviewed With patient   Plan of Care Review   Progress improving   OTHER   Outcome Summary Voids, ambulates in room, scant lochia and doing jami care, using lanolin after breat feeding to prevent soreness, going to shower tonight, no c/o's of pain this evering, will f/u with Carlos      Goal: Individualization and Mutuality  Outcome: Ongoing (interventions implemented as appropriate)    Goal: Discharge Needs Assessment  Outcome: Ongoing (interventions implemented as appropriate)      Problem: Fall Risk,  (Adult,Obstetrics,Pediatric)  Goal: Identify Related Risk Factors and Signs and Symptoms  Outcome: Outcome(s) achieved Date Met: 19    Goal: Absence of Maternal Fall  Outcome: Ongoing (interventions implemented as appropriate)    Goal: Absence of  Fall/Drop  Outcome: Outcome(s) achieved Date Met: 19      Problem: Postpartum (Vaginal Delivery) (Adult,Obstetrics,Pediatric)  Goal: Signs and Symptoms of Listed Potential Problems Will be Absent, Minimized or Managed (Postpartum)  Outcome: Ongoing (interventions implemented as appropriate)

## 2019-08-24 NOTE — PROGRESS NOTES
"[facility]  Progress Note - Obstetrics    Name: Kristal Moreno  MRN: [id number]  Date: 2019  CSN: [id number]       PPD #1 s/p  at 37w6d with GHTN     Kristal Moreno seen and examined.  Denies headache, dizziness, chest pain, shortness of breath, nausea, vomiting.  Minimal lochia.  OOB and ambulating.  Tolerating regular diet.  Voiding without difficulty.  Breastfeeding.    PHYSICAL EXAM  /67 (BP Location: Left arm, Patient Position: Lying)   Pulse 73   Temp 98.2 °F (36.8 °C) (Oral)   Resp 18   Ht 164.1 cm (64.6\")   Wt 83.1 kg (183 lb 4.8 oz)   LMP 2018 (Exact Date)   SpO2 98%   Breastfeeding? Yes   BMI 30.88 kg/m²   General: AAOx3, no apparent distress.  Lungs: CTA B/L anteriorly, no wheezes, rales, rhonchi.  CV: Acceptable rate, regular rhythm, S1/S2 normal.  Abdomen: +BS, soft, non-distended, uterine fundus firm, non-tender, and below umbilicus.  Lower extremities: No bilateral edema.  2+/4+ dorsalis pedis pulses B/L.    IMPRESSION  Kristal Moreno is a 24 y.o.  PPD #1 s/p  after IOL for GHTN.  Normal to mild range BP; asymptomatic.  Patient desires discharge, will discharge this afternoon as long as baby is able to be discharged.    PLAN  1.  Postpartum s/p   - Continue routine postpartum care.  - Diet: Pregnancy/breastfeeding  - DVT prophylaxis: SCDs  - Breastfeeding  - Birthcontrol options were discussed and patient decided on DepoProvera; will give shot before discharge per patient request  - Discharge to home today.  Follow-up in 6 weeks with OB/GYN provider.     2.  GHTN  - Will recheck BP next week      This document has been electronically signed by Haylee Meredith MD on 2019 8:52 AM.  "

## 2019-08-24 NOTE — DISCHARGE SUMMARY
[facility]  Discharge Summary  Patient Name: Kristal Moreno  : 1995  MRN: [id number]  CSN: [id number]    Discharge Summary    Date of Admission: 2019   Date of Discharge: 2019   Principle Discharge Dx: 1. GHTN  2. Vaginal delivery   Procedures Performed: Normal spontaneous vaginal delivery   Brief History: Patient is a 24 y.o. now  who presented to labor and delivery for induction of labor due to gestational hypertension.   Hospital Course: Patient presented for induction of labor. She had a . Her postpartum course was unremarkable. On PPD #1 she expressed the desire for D/C. She had passed gas and was urinating normally. She was eating a regular diet without difficulty. She was ambulating well. Discharge instructions were given. All questions were answered   Condition:  Discharge Activity:  Discharge Diet: Stable  Activity Instructions     Bathing Restrictions      Type of Restriction:  Bathing    Bathing Restrictions:  Other    Explain Bathing Restrictions:  No soaking in bathtub for 4 weeks. Showers are fine.    Driving Restrictions      Type of Restriction:  Driving    Driving Restrictions:  No Driving (Time Limited)    Length:  Other    Indicate Length of Restriction:  No driving for 1 week or if using narcotic pain medications. Riding is car is fine.    Lifting Restrictions      Type of Restriction:  Lifting    Lifting Restrictions:  Other    Explain Lifing Restrictions:  No lifting more than infant and baby carrier together for 6 weeks.    Pelvic Rest      Nothing in the vagina for 6 weeks to include tampons, intercourse, or douching.    Sexual Activity Restrictions      Type of Restriction:  Sex    Explain Sexual Activity Restrictions:  No sexual intercourse for at least 6 weeks        Regular   Discharge Medications:    Your medication list      START taking these medications      Instructions Last Dose Given Next Dose Due   acetaminophen 500 MG  tablet  Commonly known as:  TYLENOL      Take 2 tablets by mouth Every 8 (Eight) Hours.       ibuprofen 800 MG tablet  Commonly known as:  ADVIL,MOTRIN      Take 1 tablet by mouth Every 8 (Eight) Hours.          CONTINUE taking these medications      Instructions Last Dose Given Next Dose Due   clindamycin 1 % gel  Commonly known as:  CLINDAGEL      Apply  topically to the appropriate area as directed 2 (Two) Times a Day.       [employer] PRENATAL GUMMY 0.4-113.5 MG chewable tablet      Chew 1 dose Daily. Or any other prenatal (chewable) covered by insurance          STOP taking these medications    ondansetron 4 MG tablet  Commonly known as:  ZOFRAN        promethazine 12.5 MG tablet  Commonly known as:  PHENERGAN        valACYclovir 500 MG tablet  Commonly known as:  VALTREX              Where to Get Your Medications      These medications were sent to [facility]/pharmacy #[id number] - [place], [place] - [address] [place] [address] - [phone]  - [phone] [address] [address]    Phone:  [phone] ·   acetaminophen 500 MG tablet  · ibuprofen 800 MG tablet        Discharge Disposition: Home   Follow-up: No future appointments.   2 week and 6 week PP visits       This document has been electronically signed by Haylee Meredith MD on August 24, 2019 8:56 AM.

## 2019-08-24 NOTE — PLAN OF CARE
Problem: Patient Care Overview  Goal: Plan of Care Review  Outcome: Ongoing (interventions implemented as appropriate)   19 0336   Coping/Psychosocial   Plan of Care Reviewed With patient;spouse   Plan of Care Review   Progress improving   OTHER   Outcome Summary vss, ambualting and voiding well, fundus firm and bleeding light, complains of pain related to cramping from breastfeeding. pt showered tonight.      Goal: Individualization and Mutuality  Outcome: Ongoing (interventions implemented as appropriate)    Goal: Discharge Needs Assessment  Outcome: Ongoing (interventions implemented as appropriate)    Goal: Interprofessional Rounds/Family Conf  Outcome: Ongoing (interventions implemented as appropriate)      Problem: Anesthesia/Analgesia, Neuraxial (Obstetrics)  Goal: Signs and Symptoms of Listed Potential Problems Will be Absent, Minimized or Managed (Anesthesia/Analgesia, Neuraxial)  Outcome: Outcome(s) achieved Date Met: 19      Problem: Fall Risk,  (Adult,Obstetrics,Pediatric)  Goal: Absence of Maternal Fall  Outcome: Ongoing (interventions implemented as appropriate)      Problem: Postpartum (Vaginal Delivery) (Adult,Obstetrics,Pediatric)  Goal: Signs and Symptoms of Listed Potential Problems Will be Absent, Minimized or Managed (Postpartum)  Outcome: Ongoing (interventions implemented as appropriate)

## 2019-08-25 NOTE — PAYOR COMM NOTE
"Jackie Marshall RN, CM  [facility]  [phone] phone  [phone] fax  Delivery Summary  Kristal Goddard (24 y.o. Female)     Date of Birth Social Security Number Address Home Phone MRN    1995  [address] [place] [address] [phone] [id number]    Tenriism Marital Status          None Single       Admission Date Admission Type Admitting Provider Attending Provider Department, Room/Bed    8/22/19 Elective Haylee Meredith MD  [facility] MOTHER BABY, M756/1    Discharge Date Discharge Disposition Discharge Destination        8/24/2019 Home or Self Care              Attending Provider:  (none)   Allergies:  No Known Allergies    Isolation:  None   Infection:  None   Code Status:  Prior    Ht:  164.1 cm (64.6\")   Wt:  83.1 kg (183 lb 4.8 oz)    Admission Cmt:  None   Principal Problem:  Gestational hypertension [O13.9]                 Active Insurance as of 8/22/2019     Primary Coverage     Payor Plan Insurance Group Employer/Plan Group    [employer] [place] [employer] [place]      Payor Plan Address Payor Plan Phone Number Payor Plan Fax Number Effective Dates    [address] [address] [phone]  12/16/2016 - None Entered    [facility] [id number]       Subscriber Name Subscriber Birth Date Member ID       KRISTAL WREN [id number]                 Emergency Contacts      (Rel.) Home Phone Work Phone Mobile Phone    malika montoya (Friend) [phone] -- [phone]              "

## 2019-08-25 NOTE — PAYOR COMM NOTE
"Jackie Marshall RN, CM  [facility]  [phone] phone  [phone] fax  Admit Inpatient  Delivered 8/23/19  Mag  Thank you!    Kristal Wren (24 y.o. Female)     Date of Birth Social Security Number Address Home Phone MRN    1995  [address] [place] [address] [phone] [id number]    Mormonism Marital Status          None Single       Admission Date Admission Type Admitting Provider Attending Provider Department, Room/Bed    8/22/19 Elective Haylee Meredith MD  [facility] MOTHER BABY, M756/1    Discharge Date Discharge Disposition Discharge Destination        8/24/2019 Home or Self Care              Attending Provider:  (none)   Allergies:  No Known Allergies    Isolation:  None   Infection:  None   Code Status:  Prior    Ht:  164.1 cm (64.6\")   Wt:  83.1 kg (183 lb 4.8 oz)    Admission Cmt:  None   Principal Problem:  Gestational hypertension [O13.9]                 Active Insurance as of 8/22/2019     Primary Coverage     Payor Plan Insurance Group Employer/Plan Group    [employer] [place] [employer] [place]      Payor Plan Address Payor Plan Phone Number Payor Plan Fax Number Effective Dates    [address] [address] [phone]  12/16/2016 - None Entered    [facility] [id number]       Subscriber Name Subscriber Birth Date Member ID       KRISTAL WREN 1995 [id number]                 Emergency Contacts      (Rel.) Home Phone Work Phone Mobile Phone    malika montoya (Friend) [phone] -- [phone]               History & Physical      Haylee Meredith MD at 8/22/2019  5:37 PM          H&P reviewed. The patient was examined and there are no changes to the H&P.     Patient doing well.  Denies HA, vision changes, or RUQ pain.  Reports some contractions but denies LOF or VB.    /80   Pulse 91   Temp 99.1 °F (37.3 °C) (Oral)   Ht 164.1 cm (64.6\")   Wt 83.1 kg (183 lb 4.8 oz)   " LMP 2018 (Exact Date)   SpO2 98%   Breastfeeding? Yes   BMI 30.88 kg/m²      SVE: /-5/firm/posterior  Misoprostol 25mcg PV placed at 5:30PM     2019 16:14   WBC 21.28 (H)   RBC 3.73 (L)   Hemoglobin 10.1 (L)   Hematocrit 29.5 (L)   RDW 13.2   MCV 79.1   MCH 27.1   MCHC 34.2   MPV 11.6   Platelets 277   RDW-SD 37.7      2019 16:14   Glucose 101 (H)   Sodium 139   Potassium 3.2 (L)   CO2 19.0 (L)   Chloride 105   Anion Gap 15.0   Creatinine 0.63   BUN 5 (L)   BUN/Creatinine Ratio 7.9   Calcium 9.1   eGFR Non African Am 118   Alkaline Phosphatase 200 (H)   Total Protein 6.9   ALT (SGPT) 7   AST (SGOT) 16   Total Bilirubin 0.3   Albumin 3.20 (L)   Globulin 3.7   A/G Ratio 0.9      2019 16:14   Color, UA Yellow   Appearance, UA Clear   Specific Gravity, UA 1.007   pH, UA 6.5   Glucose Negative   Ketones, UA Negative   Blood, UA Large (3+) (A)   Nitrite, UA Negative   Leukocytes, UA Moderate (2+) (A)   Protein, UA Negative   Bilirubin, UA Negative   Urobilinogen, UA 1.0 E.U./dL   RBC, UA 0-2 (A)   WBC, UA 3-5   Bacteria, UA None Seen   Squamous Epithelial Cells, UA 3-5 (A)   Hyaline Casts, UA 0-2     UDS negative    P/Cr pending    A/P: Kristal Greene Moreno is a 24 y.o.  at 37w5d with GHTN.  No signs of preeclampsia at this time.  IOL began with cervical ripening.  Will recheck in 4h and determine if additional cervical ripening indicated vs mechanical dilation with Cook catheter balloon.  Cat 1 FHT.  May have a meal, then clear liquids.  I discussed induction process and need for medications and likely pitocin given her indication for delivery.  Patient and family voice understanding.    Electronically signed by Haylee Meredith MD at 2019  5:42 PM   Source Note             HISTORY & PHYSICAL - Obstetrics  [facility]    Name: Kristal Moreno  MRN: [id number]  Date: 2019  CSN: [id number]      CHIEF COMPLAINT:  Elevated BP at  "home    HISTORY OF PRESENT ILLNESS  Kristal Moreno is a 24 y.o.  at 37w5d gestational age by LMP = 1TUS suggesting Estimated Date of Delivery: 19.  The patient presents today for an OB visit.  BP today was 138/91.  She reports that she had a \"very elevated BP\" at a dentist appointment and was monitored on L&D.  She had 1 mild range BP during that visit (128/95) but no others and BP since then has been normotensive.  Today, she denies any HA, vision changes, or RUQ pain.  She does report increased swelling of her hands, feet, and face.  Denies LOF, vaginal bleeding, or regular contractions.  Reports good FM.    Patient denies any chest pain, palpitations, headaches, lightheadedness, shortness of breath, cough, nausea, vomiting, diarrhea, constipation, fever, or chills.    ROS as above and otherwise negative    PRENATAL LAB RESULTS  Prenatal labs reviewed  External Prenatal Results     Pregnancy Outside Results - Transcribed From Office Records - See Scanned Records For Details     Test Value Date Time    Hgb 10.9 g/dL 19 1440    Hct 32.5 % 19 1440    ABO A  19 0950    Rh Positive  19 0950    Antibody Screen Negative  19 0950    Glucose Fasting GTT       Glucose Tolerance Test 1 hour       Glucose Tolerance Test 3 hour       Gonorrhea (discrete) Negative  19 0950    Chlamydia (discrete) Negative  19 0950    RPR Non-Reactive  19 0950    VDRL       Syphilis Antibody       Rubella 145.0 IU/mL 19 0950      Immune  19 0950    HBsAg Negative  19 0950    Herpes Simplex Virus PCR       Herpes Simplex VIrus Culture       HIV Negative  19 0950    Hep C RNA Quant PCR       Hep C Antibody Negative  19 0950    AFP       Group B Strep Negative  19 1104    GBS Susceptibility to Clindamycin       GBS Susceptibility to Erythromycin       Fetal Fibronectin Negative  19 1113    Genetic Testing, Maternal Blood             " Drug Screening     Test Value Date Time    Urine Drug Screen       Amphetamine Screen Negative  19 0950    Barbiturate Screen Negative  19 0950    Benzodiazepine Screen Negative  19 0950    Methadone Screen Negative  19 0950    Phencyclidine Screen       Opiates Screen Negative  19 0950    THC Screen Negative  19 0950    Cocaine Screen       Propoxyphene Screen       Buprenorphine Screen       Methamphetamine Screen       Oxycodone Screen Negative  19 0950    Tricyclic Antidepressants Screen                   PRENATAL RISK FACTORS   Problems (from 19 to present)     Problem Noted Resolved    Supervision of normal pregnancy 2019 by Haylee Meredith MD No    Overview Signed 2019 12:00 PM by Haylee Meredith MD     A pos / Rubella immune / GBS neg  Dating: LMP = 1TUS (6wk)  Genetics: Declines  Tdap:   Flu: N/A  Anatomy: WNL  1h Glucola: 98  H&H/Plts:   Lab Results   Component Value Date    HGB 10.9 (L) 2019    HCT 32.5 (L) 2019     2019   Breast/BC undecided         HSV-1 (herpes simplex virus 1) infection 2019 by Ethan Lacy DO No    Overview Signed 2019 11:57 AM by Haylee Meredith MD     Valtrex 500mg daily starting at 36wks               DATING SUMMARY  LMP (2018) -- TYSON 2019  1TUS (2019 at 6w1d) -- TYSON 9/3/2019    OB HISTORY  OB History    Para Term  AB Living   2 0 0 0 1 0   SAB TAB Ectopic Molar Multiple Live Births   1 0 0   0        # Outcome Date GA Lbr Yonny/2nd Weight Sex Delivery Anes PTL Lv   2 Current            1 2017                GYN HISTORY  Denies h/o sexually transmitted infections/pelvic inflammatory disease  Denies h/o abnormal pap smears  Denies h/o gynecologic surgeries, including biopsies of the cervix    PAST MEDICAL HISTORY  Past Medical History:   Diagnosis Date   • Allergic rhinitis    • Fibrocystic disease of  breast    • HSV-1 infection     cold sores only   • Unsocialized conduct disorder     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; sent for evaluation        PAST SURGICAL HISTORY  Past Surgical History:   Procedure Laterality Date   • ADENOIDECTOMY     • TONSILLECTOMY       FAMILY HISTORY  Family History   Problem Relation Age of Onset   • Breast cancer Other    • Lung cancer Other    • Stroke Other    • No Known Problems Mother    • No Known Problems Paternal Grandmother    • Lung cancer Maternal Grandmother    • Cancer Maternal Grandfather    • Hypotension Maternal Grandfather    • No Known Problems Brother    • No Known Problems Brother    • No Known Problems Brother    • No Known Problems Sister      SOCIAL HISTORY  Social History     Socioeconomic History   • Marital status: Single     Spouse name: Not on file   • Number of children: Not on file   • Years of education: Not on file   • Highest education level: Not on file   Tobacco Use   • Smoking status: Former Smoker     Packs/day: 0.25     Years: 5.00     Pack years: 1.25     Types: Cigarettes     Last attempt to quit: 3/1/2019     Years since quittin.4   • Smokeless tobacco: Never Used   • Tobacco comment: nothing current during pregnancy   Substance and Sexual Activity   • Alcohol use: No   • Drug use: No   • Sexual activity: Yes     Partners: Male     Birth control/protection: None     ALLERGIES  No Known Allergies  HOME MEDICATIONS  Prior to Admission medications    Medication Sig Start Date End Date Taking? Authorizing Provider   Prenatal Vit-Min-FA-Fish Oil ([employer] PRENATAL GUMMY) 0.4-113.5 MG chewable tablet Chew 1 dose Daily. Or any other prenatal (chewable) covered by insurance 3/7/19 3/7/20 Yes Roula Vega CNM   valACYclovir (VALTREX) 500 MG tablet Take 500 mg by mouth Daily. 19  Yes Provider, MD Gonzales   clindamycin (CLINDAGEL) 1 % gel Apply  topically to the appropriate area as directed 2 (Two) Times  a Day. 19   Юлия Moore APRN   ondansetron (ZOFRAN) 4 MG tablet Take 1 tablet by mouth Every 8 (Eight) Hours As Needed for Nausea or Vomiting. 19  Abida Archibald APRN   promethazine (PHENERGAN) 12.5 MG tablet Take 1 tablet by mouth Every 6 (Six) Hours As Needed for Nausea or Vomiting. 3/7/19   Roula Vega CNM     PHYSICAL EXAM  /91   Wt 83.9 kg (185 lb)   LMP 2018 (Exact Date)   BMI 29.86 kg/m²    General: No acute distress. Well developed, well nourished. Pleasant.  Heart: Regular rate and rhythm. No murmurs, rubs, or gallops  Lungs: Clear to auscultation bilaterally. No wheezes, rales, or rhonchi.  Abdomen: Soft, nontender to palpation, enlarged by gravid uterus.    SVE: 1 50/ -3/ midconsistency/ posterior     Beside Ultrasound:  Presenting part: cephalic  Leopold's: 6.5#    IMPRESSION  Kristal Moreno is a 24 y.o.  at 37w5d admitted with GHTN, possible preeclampsia.  Will obtain labs to rule out preeclampsia and proceed with induction of labor with cervical ripening.    PLAN  1.  IUP at 37w5d with GHTN  - Admit: Labor and Delivery  - Attending: Dr. Meredith  - Diagnosis: GHTN  - Condition: Stable  - Vitals: per protocol, at least q4h  - Activity: ad gema  - Nursing: Continuous electronic fetal monitoring, as per protocol  - Diet: NPO except ice/clears  - IV fluids:  mL/hr  - Meds: Cytotec 25mcg PV x1, then likely Cook catheter balloon  - Allergies: NKDA  - Labs: CBC, T&S, UDS, CMP, P/Cr, UA  - GBS: negative.  Antibiotics: not indicated  - Patient is unsure regarding an epidural  - Anticipate     2.  HSV1  - No genital lesions  - No recent outbreaks  - No evidence of lesions      This document has been electronically signed by Haylee Meredith MD on 2019 11:59 AM.     Electronically signed by Haylee Meredith MD at 2019 12:04 PM             Haylee Meredith MD at 2019 10:45 AM       "    HISTORY & PHYSICAL - Obstetrics  [facility]    Name: Kristal Moreno  MRN: [id number]  Date: 2019  CSN: [id number]      CHIEF COMPLAINT:  Elevated BP at home    HISTORY OF PRESENT ILLNESS  Kristal Moreno is a 24 y.o.  at 37w5d gestational age by LMP = 1TUS suggesting Estimated Date of Delivery: 19.  The patient presents today for an OB visit.  BP today was 138/91.  She reports that she had a \"very elevated BP\" at a dentist appointment and was monitored on L&D.  She had 1 mild range BP during that visit (128/95) but no others and BP since then has been normotensive.  Today, she denies any HA, vision changes, or RUQ pain.  She does report increased swelling of her hands, feet, and face.  Denies LOF, vaginal bleeding, or regular contractions.  Reports good FM.    Patient denies any chest pain, palpitations, headaches, lightheadedness, shortness of breath, cough, nausea, vomiting, diarrhea, constipation, fever, or chills.    ROS as above and otherwise negative    PRENATAL LAB RESULTS  Prenatal labs reviewed  External Prenatal Results     Pregnancy Outside Results - Transcribed From Office Records - See Scanned Records For Details     Test Value Date Time    Hgb 10.9 g/dL 19 1440    Hct 32.5 % 19 1440    ABO A  19 0950    Rh Positive  19 0950    Antibody Screen Negative  19 0950    Glucose Fasting GTT       Glucose Tolerance Test 1 hour       Glucose Tolerance Test 3 hour       Gonorrhea (discrete) Negative  19 0950    Chlamydia (discrete) Negative  19 0950    RPR Non-Reactive  19 0950    VDRL       Syphilis Antibody       Rubella 145.0 IU/mL 19 0950      Immune  19 0950    HBsAg Negative  19 0950    Herpes Simplex Virus PCR       Herpes Simplex VIrus Culture       HIV Negative  19 0950    Hep C RNA Quant PCR       Hep C Antibody Negative  19 0950    AFP       Group B Strep " Negative  19 1104    GBS Susceptibility to Clindamycin       GBS Susceptibility to Erythromycin       Fetal Fibronectin Negative  19 1113    Genetic Testing, Maternal Blood             Drug Screening     Test Value Date Time    Urine Drug Screen       Amphetamine Screen Negative  19 0950    Barbiturate Screen Negative  19 0950    Benzodiazepine Screen Negative  19 0950    Methadone Screen Negative  19 0950    Phencyclidine Screen       Opiates Screen Negative  19 0950    THC Screen Negative  19 0950    Cocaine Screen       Propoxyphene Screen       Buprenorphine Screen       Methamphetamine Screen       Oxycodone Screen Negative  19 0950    Tricyclic Antidepressants Screen                   PRENATAL RISK FACTORS   Problems (from 19 to present)     Problem Noted Resolved    Supervision of normal pregnancy 2019 by Haylee Meredith MD No    Overview Signed 2019 12:00 PM by Haylee Meredith MD     A pos / Rubella immune / GBS neg  Dating: LMP = 1TUS (6wk)  Genetics: Declines  Tdap:   Flu: N/A  Anatomy: WNL  1h Glucola: 98  H&H/Plts:   Lab Results   Component Value Date    HGB 10.9 (L) 2019    HCT 32.5 (L) 2019     2019   Breast/BC undecided         HSV-1 (herpes simplex virus 1) infection 2019 by Ethan Lacy DO No    Overview Signed 2019 11:57 AM by Haylee Meredith MD     Valtrex 500mg daily starting at 36wks               DATING SUMMARY  LMP (2018) -- TYSON 2019  1TUS (2019 at 6w1d) -- TYSON 9/3/2019    OB HISTORY  OB History    Para Term  AB Living   2 0 0 0 1 0   SAB TAB Ectopic Molar Multiple Live Births   1 0 0   0        # Outcome Date GA Lbr Yonny/2nd Weight Sex Delivery Anes PTL Lv   2 Current            1 2017                GYN HISTORY  Denies h/o sexually transmitted infections/pelvic inflammatory disease  Denies h/o  abnormal pap smears  Denies h/o gynecologic surgeries, including biopsies of the cervix    PAST MEDICAL HISTORY  Past Medical History:   Diagnosis Date   • Allergic rhinitis    • Fibrocystic disease of breast    • HSV-1 infection     cold sores only   • Unsocialized conduct disorder     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; sent for evaluation        PAST SURGICAL HISTORY  Past Surgical History:   Procedure Laterality Date   • ADENOIDECTOMY     • TONSILLECTOMY       FAMILY HISTORY  Family History   Problem Relation Age of Onset   • Breast cancer Other    • Lung cancer Other    • Stroke Other    • No Known Problems Mother    • No Known Problems Paternal Grandmother    • Lung cancer Maternal Grandmother    • Cancer Maternal Grandfather    • Hypotension Maternal Grandfather    • No Known Problems Brother    • No Known Problems Brother    • No Known Problems Brother    • No Known Problems Sister      SOCIAL HISTORY  Social History     Socioeconomic History   • Marital status: Single     Spouse name: Not on file   • Number of children: Not on file   • Years of education: Not on file   • Highest education level: Not on file   Tobacco Use   • Smoking status: Former Smoker     Packs/day: 0.25     Years: 5.00     Pack years: 1.25     Types: Cigarettes     Last attempt to quit: 3/1/2019     Years since quittin.4   • Smokeless tobacco: Never Used   • Tobacco comment: nothing current during pregnancy   Substance and Sexual Activity   • Alcohol use: No   • Drug use: No   • Sexual activity: Yes     Partners: Male     Birth control/protection: None     ALLERGIES  No Known Allergies  HOME MEDICATIONS  Prior to Admission medications    Medication Sig Start Date End Date Taking? Authorizing Provider   Prenatal Vit-Min-FA-Fish Oil ([employer] PRENATAL GUMMY) 0.4-113.5 MG chewable tablet Chew 1 dose Daily. Or any other prenatal (chewable) covered by insurance 3/7/19 3/7/20 Yes Roula Vega CNM    valACYclovir (VALTREX) 500 MG tablet Take 500 mg by mouth Daily. 19  Yes Provider, MD Gonzales   clindamycin (CLINDAGEL) 1 % gel Apply  topically to the appropriate area as directed 2 (Two) Times a Day. 19   Юлия Moore APRN   ondansetron (ZOFRAN) 4 MG tablet Take 1 tablet by mouth Every 8 (Eight) Hours As Needed for Nausea or Vomiting. 19  Abida Archibald APRN   promethazine (PHENERGAN) 12.5 MG tablet Take 1 tablet by mouth Every 6 (Six) Hours As Needed for Nausea or Vomiting. 3/7/19   Roula Vega CNM     PHYSICAL EXAM  /91   Wt 83.9 kg (185 lb)   LMP 2018 (Exact Date)   BMI 29.86 kg/m²    General: No acute distress. Well developed, well nourished. Pleasant.  Heart: Regular rate and rhythm. No murmurs, rubs, or gallops  Lungs: Clear to auscultation bilaterally. No wheezes, rales, or rhonchi.  Abdomen: Soft, nontender to palpation, enlarged by gravid uterus.    SVE: / 50/ -3/ midconsistency/ posterior     Beside Ultrasound:  Presenting part: cephalic  Leopold's: 6.5#    IMPRESSION  Kristal Moreno is a 24 y.o.  at 37w5d admitted with GHTN, possible preeclampsia.  Will obtain labs to rule out preeclampsia and proceed with induction of labor with cervical ripening.    PLAN  1.  IUP at 37w5d with GHTN  - Admit: Labor and Delivery  - Attending: Dr. Meredith  - Diagnosis: GHTN  - Condition: Stable  - Vitals: per protocol, at least q4h  - Activity: ad gema  - Nursing: Continuous electronic fetal monitoring, as per protocol  - Diet: NPO except ice/clears  - IV fluids:  mL/hr  - Meds: Cytotec 25mcg PV x1, then likely Cook catheter balloon  - Allergies: NKDA  - Labs: CBC, T&S, UDS, CMP, P/Cr, UA  - GBS: negative.  Antibiotics: not indicated  - Patient is unsure regarding an epidural  - Anticipate     2.  HSV1  - No genital lesions  - No recent outbreaks  - No evidence of lesions      This document has been electronically signed by  "Haylee Meredith MD on 2019 11:59 AM.    Electronically signed by Haylee Meredith MD at 2019 12:04 PM          Physician Progress Notes (last 72 hours) (Notes from 2019  8:04 AM through 2019  8:04 AM)      Haylee Meredith MD at 2019  8:51 AM        [facility]  Progress Note - Obstetrics    Name: Kristal Moreno  MRN: [id number]  Date: 2019  CSN: [id number]       PPD #1 s/p  at 37w6d with GHTN     Kristal Moreno seen and examined.  Denies headache, dizziness, chest pain, shortness of breath, nausea, vomiting.  Minimal lochia.  OOB and ambulating.  Tolerating regular diet.  Voiding without difficulty.  Breastfeeding.    PHYSICAL EXAM  /67 (BP Location: Left arm, Patient Position: Lying)   Pulse 73   Temp 98.2 °F (36.8 °C) (Oral)   Resp 18   Ht 164.1 cm (64.6\")   Wt 83.1 kg (183 lb 4.8 oz)   LMP 2018 (Exact Date)   SpO2 98%   Breastfeeding? Yes   BMI 30.88 kg/m²    General: AAOx3, no apparent distress.  Lungs: CTA B/L anteriorly, no wheezes, rales, rhonchi.  CV: Acceptable rate, regular rhythm, S1/S2 normal.  Abdomen: +BS, soft, non-distended, uterine fundus firm, non-tender, and below umbilicus.  Lower extremities: No bilateral edema.  2+/4+ dorsalis pedis pulses B/L.    IMPRESSION  Kristal Moreno is a 24 y.o.  PPD #1 s/p  after IOL for GHTN.  Normal to mild range BP; asymptomatic.  Patient desires discharge, will discharge this afternoon as long as baby is able to be discharged.    PLAN  1.  Postpartum s/p   - Continue routine postpartum care.  - Diet: Pregnancy/breastfeeding  - DVT prophylaxis: SCDs  - Breastfeeding  - Birthcontrol options were discussed and patient decided on DepoProvera; will give shot before discharge per patient request  - Discharge to home today.  Follow-up in 6 weeks with OB/GYN provider.     2.  GHTN  - Will recheck BP next " "week      This document has been electronically signed by Haylee Meredith MD on 2019 8:52 AM.    Electronically signed by Haylee Meredith MD at 2019  8:53 AM     Haylee Meredith MD at 2019 10:19 PM        Labor Progress Note    Kristal Moreno, 24 y.o., seen and examined.  Patient reports contractions.  Denies HA, vision changes, or RUQ pain.    /81   Pulse 82   Temp 98 °F (36.7 °C) (Oral)   Resp 18   Ht 164.1 cm (64.6\")   Wt 83.1 kg (183 lb 4.8 oz)   LMP 2018 (Exact Date)   SpO2 98%   Breastfeeding? Yes   BMI 30.88 kg/m²    FHT: Baseline 130bpm / mod varability / + accelerations / no decelerations  TOCO: q3-5min  SVE: 1-2/70/-3/soft/posterior    Cook catheter placed without difficulty.  Both intrauterine and vaginal balloons inflated with 80cc sterile water.  Patient tolerated procedure well.    A/P: Kristal Moreno, 24 y.o.  at 37w5d admitted for IOL secondary to GHTN.  Responded well to cervical ripening.  Will proceed with mechanical dilation with balloon.  BP normal to mild range.  - s/p miso PV x1  - Cook in place.  Will hold off on pitocin at this point as she is tarun 3x/10min.  If she spaces out to 1-2 contractions/10 min, then will start low-dose pitocin at 2 mu/min and keep at that rate until balloon is out.  Balloon should be tugged on every 1h; deflate if still in place in 12h.  - FHT Category 1  - Declines epidural or pain medication  - Recheck following removal of balloon    Electronically signed by Haylee Meredith MD at 2019 10:24 PM       "

## 2019-08-28 LAB
LAB AP CASE REPORT: NORMAL
PATH REPORT.FINAL DX SPEC: NORMAL
PATH REPORT.GROSS SPEC: NORMAL

## 2019-09-12 ENCOUNTER — OFFICE VISIT (OUTPATIENT)
Dept: OBSTETRICS AND GYNECOLOGY | Facility: CLINIC | Age: 24
End: 2019-09-12

## 2019-09-12 VITALS
SYSTOLIC BLOOD PRESSURE: 136 MMHG | BODY MASS INDEX: 25.66 KG/M2 | HEIGHT: 65 IN | WEIGHT: 154 LBS | DIASTOLIC BLOOD PRESSURE: 79 MMHG

## 2019-09-12 DIAGNOSIS — O13.3 GESTATIONAL HYPERTENSION, THIRD TRIMESTER: ICD-10-CM

## 2019-09-12 PROCEDURE — 99024 POSTOP FOLLOW-UP VISIT: CPT | Performed by: OBSTETRICS & GYNECOLOGY

## 2019-09-12 NOTE — PROGRESS NOTES
"6 week postpartum visit      Kristal Moreno is a 24 y.o.  s/p  at 37w6d secondary to GHTN on 2019, who presents today for postpartum check.  The patient states she is doing well.  Patient denies postpartum depression.  Did have some baby blues but resolved now.  Menstrual cycles have not resumed.  Breastfeeding with supplementation.  Received DepoProvera in the hospital but would like an IUD.  She has not resumed sexual intercourse.  Denies bowel or bladder issues.  Denies HA, vision changes, or RUQ pain.     Problems (from 19 to present)     Problem Noted Resolved    Single liveborn, born in hospital, delivered by vaginal delivery 2019 by Haylee Meredith MD No    Gestational hypertension 2019 by Haylee Meredith MD No    Supervision of normal pregnancy 2019 by Haylee Meredith MD No    Overview Signed 2019 12:00 PM by Haylee Meredith MD     A pos / Rubella immune / GBS neg  Dating: LMP = 1TUS (6wk)  Genetics: Declines  Tdap:   Flu: N/A  Anatomy: WNL  1h Glucola: 98  H&H/Plts:   Lab Results   Component Value Date    HGB 10.9 (L) 2019    HCT 32.5 (L) 2019     2019   Breast/BC undecided         HSV-1 (herpes simplex virus 1) infection 2019 by Ethan Lacy, DO No    Overview Signed 2019 11:57 AM by Haylee Meredith MD     Valtrex 500mg daily starting at 36wks             PHYSICAL EXAM:    /79   Ht 164.1 cm (64.6\")   Wt 69.9 kg (154 lb)   LMP 2018 (Exact Date)   Breastfeeding? Yes   BMI 25.95 kg/m²   Abdomen: +BS, benign, no masses, soft, non-tender.    IMPRESSION/PLAN:  24 y.o.  s/p term  at 37w6d secondary to GHTN on 2019.  Doing well.  BP controlled.  - RTC for 6wk PP visit  - Mirena IUD for contraception      This document has been electronically signed by Haylee Meredith MD on 2019 3:00 PM.  "

## 2019-10-02 ENCOUNTER — PROCEDURE VISIT (OUTPATIENT)
Dept: OBSTETRICS AND GYNECOLOGY | Facility: CLINIC | Age: 24
End: 2019-10-02

## 2019-10-02 VITALS
HEIGHT: 65 IN | SYSTOLIC BLOOD PRESSURE: 104 MMHG | BODY MASS INDEX: 24.99 KG/M2 | WEIGHT: 150 LBS | DIASTOLIC BLOOD PRESSURE: 68 MMHG

## 2019-10-02 DIAGNOSIS — Z30.430 ENCOUNTER FOR IUD INSERTION: Primary | ICD-10-CM

## 2019-10-02 LAB
B-HCG UR QL: NEGATIVE
INTERNAL NEGATIVE CONTROL: NEGATIVE
INTERNAL POSITIVE CONTROL: POSITIVE
Lab: NORMAL

## 2019-10-02 PROCEDURE — 81025 URINE PREGNANCY TEST: CPT | Performed by: OBSTETRICS & GYNECOLOGY

## 2019-10-02 PROCEDURE — 58300 INSERT INTRAUTERINE DEVICE: CPT | Performed by: OBSTETRICS & GYNECOLOGY

## 2019-10-02 NOTE — PROGRESS NOTES
[facility]  Gynecology  Procedure Note: IUD Insertion    Patient's last menstrual period was 12/01/2018 (exact date).    Date of procedure:  10/2/2019    Risks and benefits discussed? yes  All questions answered? yes  Consents given by The patient  Written consent obtained? yes    Local anesthesia used:  no    Procedure documentation:    After verifying the patient had a low probability of being pregnant and met the criteria for insertion, a speculum has placed and the cervix was cleansed with an antiseptic solution.  The anterior lip of the cervix was grasped and the uterine cavity was gently sounded. There was mild difficulty passing the sound through the cervix.  Cervical dilation did not need to be performed prior to placing the IUD.  The uterus was anteverted and sounded to 6 cms.  The Mirena was then prepared per the manufacturers instructions.    The Mirena was advanced to a point 2 cms from the fundus and then the arms were released from the sheath.  The device was advanced to the fundus and the device was released fully from the sheath.. The string was cut 2 cms in length.  Bleeding from the cervix was scant.    She tolerated the procedure without any difficulty.    Mirena information  NDC: 86290-773-06  Lot #: [id number]  Expiration date: January 2022    Post procedure instructions: Call if any fever or excessive bleeding or pain.    Follow up needed: RTC 8 weeks for IUD string check, pap smear at that time      This document has been electronically signed by Haylee Meredith MD on October 2, 2019 2:07 PM.

## 2019-12-05 ENCOUNTER — APPOINTMENT (OUTPATIENT)
Dept: LAB | Facility: HOSPITAL | Age: 24
End: 2019-12-05

## 2019-12-05 ENCOUNTER — TELEPHONE (OUTPATIENT)
Dept: OBSTETRICS AND GYNECOLOGY | Facility: CLINIC | Age: 24
End: 2019-12-05

## 2019-12-05 DIAGNOSIS — N89.8 VAGINAL DISCHARGE: Primary | ICD-10-CM

## 2019-12-05 LAB
CANDIDA ALBICANS: NEGATIVE
GARDNERELLA VAGINALIS: POSITIVE
T VAGINALIS DNA VAG QL PROBE+SIG AMP: NEGATIVE

## 2019-12-05 PROCEDURE — 87660 TRICHOMONAS VAGIN DIR PROBE: CPT | Performed by: OBSTETRICS & GYNECOLOGY

## 2019-12-05 PROCEDURE — 87480 CANDIDA DNA DIR PROBE: CPT | Performed by: OBSTETRICS & GYNECOLOGY

## 2019-12-05 PROCEDURE — 87510 GARDNER VAG DNA DIR PROBE: CPT | Performed by: OBSTETRICS & GYNECOLOGY

## 2019-12-05 RX ORDER — METRONIDAZOLE 500 MG/1
500 TABLET ORAL 2 TIMES DAILY
Qty: 14 TABLET | Refills: 0 | Status: SHIPPED | OUTPATIENT
Start: 2019-12-05 | End: 2019-12-12

## 2019-12-05 NOTE — TELEPHONE ENCOUNTER
----- Message from Haylee Meredith MD sent at 12/5/2019  1:49 PM CST -----  Please let her know +BV.  Please send in Flagyl 500mg BID x7 days.

## 2020-01-14 ENCOUNTER — APPOINTMENT (OUTPATIENT)
Dept: LAB | Facility: HOSPITAL | Age: 25
End: 2020-01-14

## 2020-01-14 ENCOUNTER — OFFICE VISIT (OUTPATIENT)
Dept: PULMONOLOGY | Facility: CLINIC | Age: 25
End: 2020-01-14

## 2020-01-14 VITALS
DIASTOLIC BLOOD PRESSURE: 78 MMHG | WEIGHT: 145.6 LBS | HEIGHT: 66 IN | OXYGEN SATURATION: 98 % | SYSTOLIC BLOOD PRESSURE: 160 MMHG | HEART RATE: 80 BPM | BODY MASS INDEX: 23.4 KG/M2

## 2020-01-14 DIAGNOSIS — L50.9 HIVES: Primary | ICD-10-CM

## 2020-01-14 LAB — CHROMATIN AB SERPL-ACNC: <10 IU/ML (ref 0–14)

## 2020-01-14 PROCEDURE — 99203 OFFICE O/P NEW LOW 30 MIN: CPT | Performed by: INTERNAL MEDICINE

## 2020-01-14 PROCEDURE — 86003 ALLG SPEC IGE CRUDE XTRC EA: CPT | Performed by: INTERNAL MEDICINE

## 2020-01-14 PROCEDURE — 86431 RHEUMATOID FACTOR QUANT: CPT | Performed by: INTERNAL MEDICINE

## 2020-01-14 PROCEDURE — 86038 ANTINUCLEAR ANTIBODIES: CPT | Performed by: INTERNAL MEDICINE

## 2020-01-14 PROCEDURE — 86160 COMPLEMENT ANTIGEN: CPT | Performed by: INTERNAL MEDICINE

## 2020-01-14 PROCEDURE — 86376 MICROSOMAL ANTIBODY EACH: CPT | Performed by: INTERNAL MEDICINE

## 2020-01-14 PROCEDURE — 36415 COLL VENOUS BLD VENIPUNCTURE: CPT | Performed by: INTERNAL MEDICINE

## 2020-01-14 RX ORDER — DIPHENHYDRAMINE HCL 25 MG
25 CAPSULE ORAL AS NEEDED
COMMUNITY
End: 2020-11-29

## 2020-01-14 RX ORDER — CETIRIZINE HYDROCHLORIDE 10 MG/1
10 TABLET ORAL DAILY
Qty: 30 TABLET | Refills: 2 | OUTPATIENT
Start: 2020-01-14 | End: 2020-11-29

## 2020-01-14 RX ORDER — PREDNISONE 10 MG/1
TABLET ORAL
Qty: 21 TABLET | Refills: 0 | Status: SHIPPED | OUTPATIENT
Start: 2020-01-14 | End: 2020-06-25

## 2020-01-14 NOTE — PROGRESS NOTES
Kristal Moreno is a 24 y.o. female.     Chief Complaint   Patient presents with   • Itching     Ref- Keren Black       History of Present Illness   This 24-year-old has a several week history of recurring hives.  These hives can be localized or generalized.  They are not usually associated with meals and not involving swelling of the face or lips or any other swelling.  She has been having them irregularly.  She has been taking Pepcid and Claritin with little relief.  She has no other medical problems and takes no medications on a regular basis.  She does have an IUD.  Family history is negative medication allergies unknown.  Social history she works of for car a lot.  She has a history of syncope.  She does not remember being bitten by a tick  The following portions of the patient's history were reviewed and updated as appropriate: allergies, current medications, past family history, past medical history, past social history, past surgical history and problem list.    Review of Systems   Constitutional: Negative.  Negative for activity change, appetite change, chills, diaphoresis, fatigue, fever and unexpected weight change.   HENT: Negative for congestion, ear discharge, ear pain, facial swelling, hearing loss, mouth sores, nosebleeds, postnasal drip, rhinorrhea, sinus pressure, sneezing, tinnitus, trouble swallowing and voice change.    Eyes: Negative for pain, discharge, redness and itching.   Respiratory: Negative for cough, shortness of breath and wheezing.    Cardiovascular: Negative for chest pain.   Gastrointestinal: Negative for abdominal pain, blood in stool, diarrhea and nausea.   Endocrine: Negative for cold intolerance, heat intolerance, polydipsia, polyphagia and polyuria.   Genitourinary: Negative for dysuria and urgency.   Musculoskeletal: Negative for arthralgias, gait problem, myalgias, neck pain and neck stiffness.   Skin: Positive for rash. Negative for color change and  "pallor.   Allergic/Immunologic: Negative for environmental allergies, food allergies and immunocompromised state.   Neurological: Negative.  Negative for dizziness, tremors, syncope, facial asymmetry, weakness, light-headedness, numbness and headaches.   Hematological: Negative for adenopathy.   Psychiatric/Behavioral: Negative.  Negative for agitation, behavioral problems, confusion, decreased concentration, dysphoric mood and hallucinations. The patient is not nervous/anxious and is not hyperactive.        /78   Pulse 80   Ht 167.6 cm (66\")   Wt 66 kg (145 lb 9.6 oz)   SpO2 98%   BMI 23.50 kg/m²   Physical Exam   Constitutional: She is oriented to person, place, and time. She appears well-developed and well-nourished. No distress.   HENT:   Head: Normocephalic and atraumatic.   Mouth/Throat: No oropharyngeal exudate.   Eyes: Pupils are equal, round, and reactive to light. EOM are normal. Right eye exhibits no discharge. Left eye exhibits no discharge. No scleral icterus.   Neck: Normal range of motion. Neck supple. No JVD present. No tracheal deviation present. No thyromegaly present.   Cardiovascular: Exam reveals no gallop and no friction rub.   No murmur heard.  Pulmonary/Chest: Effort normal and breath sounds normal. No stridor. No respiratory distress. She has no wheezes. She has no rales. She exhibits no tenderness.   Abdominal: Soft. Bowel sounds are normal. She exhibits no distension and no mass. There is no tenderness. There is no guarding.   Musculoskeletal: Normal range of motion. She exhibits no edema, tenderness or deformity.   Lymphadenopathy:     She has no cervical adenopathy.   Neurological: She is alert and oriented to person, place, and time. No cranial nerve deficit. Coordination normal.   Skin: Rash ( Urticarial rash on various parts of her body) noted. She is not diaphoretic. No erythema.   Psychiatric: She has a normal mood and affect. Her behavior is normal.   Nursing note and " vitals reviewed.        Assessment/Plan   Kristal was seen today for itching.    Diagnoses and all orders for this visit:    Hives  -     Alpha - Gal Panel  -     C1 Esterase Inhibitor, Serum  -     ZULEYKA  -     Rheumatoid Factor  -     Thyroid Peroxidase Antibody  -     Food Allergy Profile    Other orders  -     cetirizine (zyrTEC) 10 MG tablet; Take 1 tablet by mouth Daily.  -     predniSONE (DELTASONE) 10 MG tablet; 2 tablets by mouth daily for 1 week, then 1 tablet daily for 1 week      Assessment is recurring urticaria etiology?    Plan Zyrtec Zantac prednisone if needed, blood work today, return in 2 weeks      S

## 2020-01-15 LAB
ANA SER QL: NEGATIVE
THYROPEROXIDASE AB SERPL-ACNC: 16 IU/ML (ref 0–34)

## 2020-01-16 LAB — C1INH SERPL-MCNC: 31 MG/DL (ref 21–39)

## 2020-01-20 LAB
CALIF WALNUT POLN IGE QN: <0.1 KU/L
CLAM IGE QN: <0.1 KU/L
CODFISH IGE QN: <0.1 KU/L
CONV CLASS DESCRIPTION: NORMAL
CORN IGE QN: <0.1 KU/L
COW MILK IGE QN: <0.1 KU/L
EGG WHITE IGE QN: <0.1 KU/L
PEANUT IGE QN: <0.1 KU/L
SCALLOP IGE QN: <0.1 KU/L
SESAME SEED IGE: <0.1 KU/L
SHRIMP IGE: <0.1 KU/L
SOYBEAN IGE QN: <0.1 KU/L
WHEAT IGE QN: <0.1 KU/L

## 2020-01-30 ENCOUNTER — OFFICE VISIT (OUTPATIENT)
Dept: PULMONOLOGY | Facility: CLINIC | Age: 25
End: 2020-01-30

## 2020-01-30 ENCOUNTER — APPOINTMENT (OUTPATIENT)
Dept: LAB | Facility: HOSPITAL | Age: 25
End: 2020-01-30

## 2020-01-30 VITALS
OXYGEN SATURATION: 99 % | SYSTOLIC BLOOD PRESSURE: 102 MMHG | WEIGHT: 145 LBS | DIASTOLIC BLOOD PRESSURE: 60 MMHG | HEART RATE: 93 BPM | HEIGHT: 66 IN | BODY MASS INDEX: 23.3 KG/M2

## 2020-01-30 DIAGNOSIS — L50.9 URTICARIA: Primary | ICD-10-CM

## 2020-01-30 PROCEDURE — 86003 ALLG SPEC IGE CRUDE XTRC EA: CPT | Performed by: INTERNAL MEDICINE

## 2020-01-30 PROCEDURE — 36415 COLL VENOUS BLD VENIPUNCTURE: CPT | Performed by: INTERNAL MEDICINE

## 2020-01-30 PROCEDURE — 86008 ALLG SPEC IGE RECOMB EA: CPT | Performed by: INTERNAL MEDICINE

## 2020-01-30 PROCEDURE — 99212 OFFICE O/P EST SF 10 MIN: CPT | Performed by: INTERNAL MEDICINE

## 2020-02-05 LAB
ALPHA GAL IGE: 0.23 KU/L
BEEF IGE QN: <0.1 KU/L
LAMB IGE QN: <0.1 KU/L
Lab: 0
PORK IGE: <0.1 KU/L

## 2020-04-30 RX ORDER — VALACYCLOVIR HYDROCHLORIDE 500 MG/1
500 TABLET, FILM COATED ORAL DAILY
Qty: 30 TABLET | Refills: 4 | Status: SHIPPED | OUTPATIENT
Start: 2020-04-30 | End: 2020-07-20

## 2020-06-25 ENCOUNTER — HOSPITAL ENCOUNTER (EMERGENCY)
Facility: HOSPITAL | Age: 25
Discharge: HOME | End: 2020-06-26
Attending: EMERGENCY MEDICINE | Admitting: EMERGENCY MEDICINE

## 2020-06-25 VITALS
OXYGEN SATURATION: 98 % | TEMPERATURE: 98.2 F | HEIGHT: 65 IN | DIASTOLIC BLOOD PRESSURE: 61 MMHG | WEIGHT: 140 LBS | SYSTOLIC BLOOD PRESSURE: 97 MMHG | HEART RATE: 68 BPM | BODY MASS INDEX: 23.32 KG/M2 | RESPIRATION RATE: 18 BRPM

## 2020-06-25 DIAGNOSIS — R42 LIGHTHEADEDNESS: Primary | ICD-10-CM

## 2020-06-25 LAB
ALBUMIN SERPL-MCNC: 4.5 G/DL (ref 3.5–5.2)
ALBUMIN/GLOB SERPL: 1.5 G/DL
ALP SERPL-CCNC: 90 U/L (ref 39–117)
ALT SERPL W P-5'-P-CCNC: 7 U/L (ref 1–33)
ANION GAP SERPL CALCULATED.3IONS-SCNC: 13 MMOL/L (ref 5–15)
AST SERPL-CCNC: 13 U/L (ref 1–32)
BACTERIA UR QL AUTO: ABNORMAL /HPF
BASOPHILS # BLD AUTO: 0.04 10*3/MM3 (ref 0–0.2)
BASOPHILS NFR BLD AUTO: 0.3 % (ref 0–1.5)
BILIRUB SERPL-MCNC: 0.3 MG/DL (ref 0.2–1.2)
BILIRUB UR QL STRIP: NEGATIVE
BUN BLD-MCNC: 8 MG/DL (ref 6–20)
BUN/CREAT SERPL: 11.9 (ref 7–25)
CALCIUM SPEC-SCNC: 9.6 MG/DL (ref 8.6–10.5)
CHLORIDE SERPL-SCNC: 103 MMOL/L (ref 98–107)
CLARITY UR: CLEAR
CO2 SERPL-SCNC: 23 MMOL/L (ref 22–29)
COLOR UR: YELLOW
CREAT BLD-MCNC: 0.67 MG/DL (ref 0.57–1)
DEPRECATED RDW RBC AUTO: 42.3 FL (ref 37–54)
EOSINOPHIL # BLD AUTO: 0.33 10*3/MM3 (ref 0–0.4)
EOSINOPHIL NFR BLD AUTO: 2.3 % (ref 0.3–6.2)
ERYTHROCYTE [DISTWIDTH] IN BLOOD BY AUTOMATED COUNT: 14.2 % (ref 12.3–15.4)
GFR SERPL CREATININE-BSD FRML MDRD: 109 ML/MIN/1.73
GLOBULIN UR ELPH-MCNC: 3 GM/DL
GLUCOSE BLD-MCNC: 103 MG/DL (ref 65–99)
GLUCOSE BLDC GLUCOMTR-MCNC: 98 MG/DL (ref 70–130)
GLUCOSE UR STRIP-MCNC: NEGATIVE MG/DL
HCG SERPL QL: NEGATIVE
HCT VFR BLD AUTO: 43.5 % (ref 34–46.6)
HGB BLD-MCNC: 14.3 G/DL (ref 12–15.9)
HGB UR QL STRIP.AUTO: NEGATIVE
HYALINE CASTS UR QL AUTO: ABNORMAL /LPF
IMM GRANULOCYTES # BLD AUTO: 0.04 10*3/MM3 (ref 0–0.05)
IMM GRANULOCYTES NFR BLD AUTO: 0.3 % (ref 0–0.5)
KETONES UR QL STRIP: ABNORMAL
LEUKOCYTE ESTERASE UR QL STRIP.AUTO: NEGATIVE
LYMPHOCYTES # BLD AUTO: 5.3 10*3/MM3 (ref 0.7–3.1)
LYMPHOCYTES NFR BLD AUTO: 37.4 % (ref 19.6–45.3)
MCH RBC QN AUTO: 27 PG (ref 26.6–33)
MCHC RBC AUTO-ENTMCNC: 32.9 G/DL (ref 31.5–35.7)
MCV RBC AUTO: 82.2 FL (ref 79–97)
MONOCYTES # BLD AUTO: 0.71 10*3/MM3 (ref 0.1–0.9)
MONOCYTES NFR BLD AUTO: 5 % (ref 5–12)
NEUTROPHILS # BLD AUTO: 7.74 10*3/MM3 (ref 1.7–7)
NEUTROPHILS NFR BLD AUTO: 54.7 % (ref 42.7–76)
NITRITE UR QL STRIP: NEGATIVE
NRBC BLD AUTO-RTO: 0 /100 WBC (ref 0–0.2)
PH UR STRIP.AUTO: 6.5 [PH] (ref 5–9)
PLATELET # BLD AUTO: 269 10*3/MM3 (ref 140–450)
PMV BLD AUTO: 10.9 FL (ref 6–12)
POTASSIUM BLD-SCNC: 3.7 MMOL/L (ref 3.5–5.2)
PROT SERPL-MCNC: 7.5 G/DL (ref 6–8.5)
PROT UR QL STRIP: ABNORMAL
RBC # BLD AUTO: 5.29 10*6/MM3 (ref 3.77–5.28)
RBC # UR: ABNORMAL /HPF
REF LAB TEST METHOD: ABNORMAL
SODIUM BLD-SCNC: 139 MMOL/L (ref 136–145)
SP GR UR STRIP: 1.02 (ref 1–1.03)
SQUAMOUS #/AREA URNS HPF: ABNORMAL /HPF
UROBILINOGEN UR QL STRIP: ABNORMAL
WBC NRBC COR # BLD: 14.16 10*3/MM3 (ref 3.4–10.8)
WBC UR QL AUTO: ABNORMAL /HPF

## 2020-06-25 PROCEDURE — 99283 EMERGENCY DEPT VISIT LOW MDM: CPT

## 2020-06-25 PROCEDURE — 82962 GLUCOSE BLOOD TEST: CPT

## 2020-06-25 PROCEDURE — 80053 COMPREHEN METABOLIC PANEL: CPT | Performed by: EMERGENCY MEDICINE

## 2020-06-25 PROCEDURE — 84703 CHORIONIC GONADOTROPIN ASSAY: CPT | Performed by: EMERGENCY MEDICINE

## 2020-06-25 PROCEDURE — 81001 URINALYSIS AUTO W/SCOPE: CPT | Performed by: EMERGENCY MEDICINE

## 2020-06-25 PROCEDURE — 85025 COMPLETE CBC W/AUTO DIFF WBC: CPT | Performed by: EMERGENCY MEDICINE

## 2020-06-25 PROCEDURE — 85007 BL SMEAR W/DIFF WBC COUNT: CPT | Performed by: EMERGENCY MEDICINE

## 2020-06-25 RX ORDER — SODIUM CHLORIDE 0.9 % (FLUSH) 0.9 %
10 SYRINGE (ML) INJECTION AS NEEDED
Status: DISCONTINUED | OUTPATIENT
Start: 2020-06-25 | End: 2020-06-26 | Stop reason: HOSPADM

## 2020-06-26 LAB
RBC MORPH BLD: NORMAL
SMALL PLATELETS BLD QL SMEAR: ADEQUATE
WBC MORPH BLD: NORMAL
WHOLE BLOOD HOLD SPECIMEN: NORMAL

## 2020-06-26 NOTE — ED PROVIDER NOTES
"Subjective   25-year-old female presents the emergency department with complaint of blurred vision, dizziness, and elevated blood glucose.  She reports that she was feeling somewhat lightheaded and use her mother's blood glucose monitor to check and reported her glucose greater than 200.  She reports that this was just after eating and it \"kept going up\".  Upon arrival in the emergency department she denies any symptoms and reports she is back at baseline.  No headache, no fever no chills.  She has recently had a prolonged period and reports bleeding for 17 days.  She is scheduled to follow with OB/GYN soon.  She reports that her mother gave her a cinnamon pill while at home.    Family history, surgical history, social history, current medications and allergies are reviewed with the patient and triage documentation and vitals are reviewed.      History provided by:  Patient   used: No        Review of Systems   Constitutional: Negative for chills, diaphoresis and fever.   HENT: Negative for congestion, sinus pressure and sinus pain.    Eyes: Positive for visual disturbance. Negative for photophobia.   Respiratory: Negative for cough and shortness of breath.    Cardiovascular: Negative for chest pain, palpitations and leg swelling.   Gastrointestinal: Negative for abdominal pain, diarrhea, nausea and vomiting.   Endocrine: Negative for cold intolerance, heat intolerance, polydipsia, polyphagia and polyuria.   Genitourinary: Positive for vaginal bleeding (Recently resolved). Negative for dysuria, frequency and urgency.   Musculoskeletal: Negative for arthralgias, back pain, myalgias and neck pain.   Skin: Negative for color change and wound.   Allergic/Immunologic: Negative.    Neurological: Positive for light-headedness. Negative for weakness, numbness and headaches.   Hematological: Negative.    Psychiatric/Behavioral: Negative.        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; sent for evaluation          No Known Allergies    Past Surgical History:   Procedure Laterality Date   • ADENOIDECTOMY     • TONSILLECTOMY         Family History   Problem Relation Age of Onset   • Breast cancer Other    • Lung cancer Other    • Stroke Other    • No Known Problems Mother    • No Known Problems Paternal Grandmother    • Lung cancer Maternal Grandmother    • Cancer Maternal Grandfather    • Hypotension Maternal Grandfather    • No Known Problems Brother    • No Known Problems Brother    • No Known Problems Brother    • No Known Problems Sister        Social History     Socioeconomic History   • Marital status: Single     Spouse name: Not on file   • Number of children: Not on file   • Years of education: Not on file   • Highest education level: Not on file   Tobacco Use   • Smoking status: Former Smoker     Packs/day: 0.25     Years: 5.00     Pack years: 1.25     Types: Cigarettes     Last attempt to quit: 3/1/2019     Years since quittin.3   • Smokeless tobacco: Never Used   • Tobacco comment: nothing current during pregnancy   Substance and Sexual Activity   • Alcohol use: No   • Drug use: No   • Sexual activity: Yes     Partners: Male     Birth control/protection: None           Objective   Physical Exam   Constitutional: She is oriented to person, place, and time. She appears well-developed and well-nourished. No distress.   HENT:   Head: Normocephalic.   Mouth/Throat: Oropharynx is clear and moist.   Eyes: Pupils are equal, round, and reactive to light. Conjunctivae are normal.   Neck: Normal range of motion.   Cardiovascular: Normal rate, regular rhythm and intact distal pulses.   No murmur heard.  Pulmonary/Chest: Effort normal and breath sounds normal. No respiratory distress. She has no wheezes.    Abdominal: Soft. Bowel sounds are normal. She exhibits no distension. There is no tenderness.   Musculoskeletal: Normal range of motion. She exhibits no edema.   Neurological: She is alert and oriented to person, place, and time.   Skin: Skin is warm and dry. Capillary refill takes less than 2 seconds. She is not diaphoretic.   Psychiatric: She has a normal mood and affect. Her behavior is normal.   Nursing note and vitals reviewed.      Procedures  none         ED Course      Labs Reviewed   COMPREHENSIVE METABOLIC PANEL - Abnormal; Notable for the following components:       Result Value    Glucose 103 (*)     All other components within normal limits    Narrative:     GFR Normal >60  Chronic Kidney Disease <60  Kidney Failure <15     URINALYSIS W/ MICROSCOPIC IF INDICATED (NO CULTURE) - Abnormal; Notable for the following components:    Ketones, UA Trace (*)     Protein, UA 30 mg/dL (1+) (*)     All other components within normal limits   CBC WITH AUTO DIFFERENTIAL - Abnormal; Notable for the following components:    WBC 14.16 (*)     RBC 5.29 (*)     Neutrophils, Absolute 7.74 (*)     Lymphocytes, Absolute 5.30 (*)     All other components within normal limits   URINALYSIS, MICROSCOPIC ONLY - Abnormal; Notable for the following components:    RBC, UA 0-2 (*)     Squamous Epithelial Cells, UA 3-5 (*)     All other components within normal limits   HCG, SERUM, QUALITATIVE - Normal   POCT GLUCOSE FINGERSTICK - Normal   SCAN SLIDE   POCT GLUCOSE FINGERSTICK   CBC AND DIFFERENTIAL    Narrative:     The following orders were created for panel order CBC & Differential.  Procedure                               Abnormality         Status                     ---------                               -----------         ------                     CBC Auto Differential[[id number]]        Abnormal            Final result                 Please view results for these tests on the individual orders.   EXTRA TUBES    Narrative:      The following orders were created for panel order Extra Tubes.  Procedure                               Abnormality         Status                     ---------                               -----------         ------                     Light Blue Top[[id number]]                                   In process                   Please view results for these tests on the individual orders.   LIGHT BLUE TOP     No results found.          MDM  Number of Diagnoses or Management Options  Lightheadedness:      Amount and/or Complexity of Data Reviewed  Clinical lab tests: reviewed    Patient Progress  Patient progress: stable    Patient with symptoms resolved upon arrival in the emergency department.  Laboratory studies including hemoglobin, and blood glucose unremarkable.  Pregnancy test negative.  Urinalysis negative.  Advised on follow-up with primary care and agreeable to discharge.    Final diagnoses:   LightheadedRobbi Hylton,   06/26/20 0013

## 2020-06-26 NOTE — DISCHARGE INSTRUCTIONS
Please return with new or worsening symptoms.  Monitor blood glucose should she have recurrent symptoms and follow-up with primary care.

## 2020-07-20 RX ORDER — VALACYCLOVIR HYDROCHLORIDE 500 MG/1
TABLET, FILM COATED ORAL
Qty: 90 TABLET | Refills: 1 | Status: SHIPPED | OUTPATIENT
Start: 2020-07-20 | End: 2021-07-27

## 2020-08-07 ENCOUNTER — OFFICE VISIT (OUTPATIENT)
Dept: OTOLARYNGOLOGY | Facility: CLINIC | Age: 25
End: 2020-08-07

## 2020-08-07 VITALS — TEMPERATURE: 97.5 F | BODY MASS INDEX: 22.66 KG/M2 | HEIGHT: 66 IN | WEIGHT: 141 LBS

## 2020-08-07 DIAGNOSIS — S01.312A EAR LOBE LACERATION, LEFT, INITIAL ENCOUNTER: Primary | ICD-10-CM

## 2020-08-07 PROCEDURE — 99203 OFFICE O/P NEW LOW 30 MIN: CPT | Performed by: OTOLARYNGOLOGY

## 2020-08-07 NOTE — PATIENT INSTRUCTIONS

## 2020-08-07 NOTE — PROGRESS NOTES
Subjective   Kristal Moreno is a 25 y.o. female.   Ear problem  History of Present Illness   Patient suffered ear laceration from baby and had actually 2 separate lacerations the earlobe was torn by her child grabbing earrings not bleeding now not particularly tender at this point and happened 3 days ago      The following portions of the patient's history were reviewed and updated as appropriate: allergies, current medications, past family history, past medical history, past social history, past surgical history and problem list.      Kristal Moreno reports that she has been smoking cigarettes. She has smoked for the past 5.00 years. She has never used smokeless tobacco. She reports that she does not drink alcohol or use drugs.  Patient is a tobacco user and has been counseled for use of tobacco products    Family History   Problem Relation Age of Onset   • Breast cancer Other    • Lung cancer Other    • Stroke Other    • Heart defect Mother    • Drug abuse Mother    • Anxiety disorder Mother    • Depression Mother    • No Known Problems Paternal Grandmother    • Lung cancer Maternal Grandmother    • Cancer Maternal Grandfather    • Hypotension Maternal Grandfather    • No Known Problems Half-Sister    • No Known Problems Half-Sister    • No Known Problems Half-Brother    • No Known Problems Half-Brother    • No Known Problems Half-Brother    • No Known Problems Half-Brother    • No Known Problems Daughter          Current Outpatient Medications:   •  cetirizine (zyrTEC) 10 MG tablet, Take 1 tablet by mouth Daily., Disp: 30 tablet, Rfl: 2  •  levonorgestrel (Mirena, 52 MG,) 20 MCG/24HR IUD, 1 each by Intrauterine route 1 (One) Time., Disp: , Rfl:   •  valACYclovir (VALTREX) 500 MG tablet, TAKE 1 TABLET BY MOUTH EVERY DAY, Disp: 90 tablet, Rfl: 1  •  diphenhydrAMINE (BENADRYL) 25 mg capsule, Take 25 mg by mouth As Needed for Itching., Disp: , Rfl:   •  doxycycline (MONODOX) 100 MG  capsule, Take 1 capsule by mouth 2 (Two) Times a Day for 10 days., Disp: 20 capsule, Rfl: 0    Current Facility-Administered Medications:   •  levonorgestrel (MIRENA) 20 MCG/24HR IUD, , Intrauterine, Once, Haylee Meredith MD    No Known Allergies    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   HENT: Negative for ear discharge, ear pain and hearing loss.    Hematological: Negative for adenopathy.   All other systems reviewed and are negative.          Objective   Physical Exam   Constitutional: She appears well-developed.   HENT:   Head: Normocephalic.       Left Ear: External ear normal.   Nose: Nose normal.   Mouth/Throat: Oropharynx is clear and moist.   Neck: Neck supple.   Pulmonary/Chest: Effort normal.   Musculoskeletal: Normal range of motion.   Lymphadenopathy:     She has no cervical adenopathy.   Neurological: She is alert.   Skin: Skin is warm.   Psychiatric: She has a normal mood and affect. Thought content normal.   Nursing note and vitals reviewed.            Assessment/Plan   Kristal was seen today for ear problem.    Diagnoses and all orders for this visit:    Ear lobe laceration, left, initial encounter    We discussed is too late to primarily repair the lacerations and there is no sign of infection discussed excision of the defect because there is a large piece that is been torn from that and then have to close the wound with him complex rotation flap.  Discussed with him all the risk benefits failure rate that there will  be some scarring smaller ear she wants to consider reschedule in the near future all questions were answered  Allow the wound to mature and then will have to have a reexcision of the scar tissue and closure discussed with all the risk benefits and she went to consider we will can schedule the near future

## 2020-09-18 ENCOUNTER — PROCEDURE VISIT (OUTPATIENT)
Dept: OTOLARYNGOLOGY | Facility: CLINIC | Age: 25
End: 2020-09-18

## 2020-09-18 VITALS — HEIGHT: 66 IN | TEMPERATURE: 98.2 F | BODY MASS INDEX: 22.76 KG/M2 | WEIGHT: 141.6 LBS

## 2020-09-18 DIAGNOSIS — S01.311D COMPLEX LACERATION OF RIGHT EAR, SUBSEQUENT ENCOUNTER: Primary | ICD-10-CM

## 2020-09-18 PROCEDURE — 11442 EXC FACE-MM B9+MARG 1.1-2 CM: CPT | Performed by: OTOLARYNGOLOGY

## 2020-09-18 PROCEDURE — 12052 INTMD RPR FACE/MM 2.6-5.0 CM: CPT | Performed by: OTOLARYNGOLOGY

## 2020-09-18 NOTE — PROGRESS NOTES
OPERATIVE NOTE    Name:    Kristal Moreno  YOB: 1995  Date of surgery:   9/18/2020    Pre-op Diagnosis:  Earlobe laceration right-multiple  Post-op Diagnosis:    Same  Procedure: Excision of scar and repair of earlobe laceration right multilayer closure  - 1.5 cm in length      Surgeon:  Garret Leon MD, AAOHNS          Anesthesia: Local anesthetic 2 mL 1% lidocaine    Staff: MANDO Isaac and DAISY Lowe       Estimated Blood Loss: 1 mL    Specimens:            None         Drains: None    Findings: Complex laceration where she had 2 earrings ripped through the earlobe with scar sticking out and nubbin causing irregular wound widely open    Complications: None    IMPLANTS: None       INDICATIONS: Right earlobe laceration    PROCEDURE: After getting consent the timeout was carried out.  Local anesthetic was injected as above.    This was then prepped and draped in the area of the wound and around the wound.  This left approximately 5 minutes.  The scar was then excised and required multiple cuts and different angles to get all the skin out of the midportion between the 2 halves of the laceration which was at least 1.5 cm in length.  After cleaning that and advancing skin forward multilayer closure with 4-0 Vicryl and 4-0 chromic the wound was repaired in 3 layers.  Ointment is placed and dressing placed  Wound care was explained the patient signs infection concerns to call us back otherwise will recheck in 3 weeks she tolerated well and there are no complications            This document has been electronically signed by Garret Leon MD on September 18, 2020 09:11 CDT

## 2020-09-18 NOTE — PATIENT INSTRUCTIONS

## 2020-10-09 ENCOUNTER — TELEPHONE (OUTPATIENT)
Dept: OTOLARYNGOLOGY | Facility: CLINIC | Age: 25
End: 2020-10-09

## 2020-10-09 NOTE — TELEPHONE ENCOUNTER
10/09/2020, 1438 - Patient telephoned per this staff member [phone].  Zero answer.  Zero option to submit voice message.

## 2020-10-09 NOTE — TELEPHONE ENCOUNTER
----- Message from Ni Lawson sent at 10/9/2020  1:53 PM CDT -----  Unable to come to appt today due to work.  She is a PO - when do you want her rescheduled?

## 2020-10-09 NOTE — TELEPHONE ENCOUNTER
10/09/2020, 1447 - Patient's friend, Venus Watkins, telephoned per this staff member [phone].  Ms. Roland stated she would relay message to patient regarding patient need to contact office of Dr. Garret Leon.    Rationale for speaking with patient - Notification of rescheduled post operative clinical appointment with Dr. Edward Ledesma, October 13, 2020 at 4:15 P.M.     Note - E-Mail ([email or web address]) also submitted to patient regarding  rescheduled post operative clinical appointment with Dr. Edward Ledesma, October 13, 2020 at 4:15 P.M.

## 2020-10-12 ENCOUNTER — TELEPHONE (OUTPATIENT)
Dept: OTOLARYNGOLOGY | Facility: CLINIC | Age: 25
End: 2020-10-12

## 2020-10-12 NOTE — TELEPHONE ENCOUNTER
10/12/2020, 1637 - Patient telephoned per this staff member [phone].  Zero answer.  Voice message submitted with request to contact office regarding rescheduling of post operative clinical appointment.    Note - Patient canceled post operative clinical appointment scheduled Tuesday, October 13, 2020 at 4:15 P.M. as she is in quarantine until 10/23/2020.  Per Dr. Leon, patient's post operative clinical appointment to be rescheduled in early November.  Clinical appointment scheduled Thursday, November 5, 2020 at 2:00 P.M.

## 2020-10-12 NOTE — TELEPHONE ENCOUNTER
10/12/2020, 1637 - Patient telephoned per this staff member [phone].  Zero answer.  Voice message submitted with request to contact office regarding rescheduling of post operative clinical appointment.     Note - Patient canceled post operative clinical appointment scheduled Tuesday, October 13, 2020 at 4:15 P.M. as she is in quarantine until 10/23/2020.  Per Dr. Leon, patient's post operative clinical appointment to be rescheduled in early November.  Clinical appointment scheduled Thursday, November 5, 2020 at 2:00 P.M.

## 2020-10-12 NOTE — TELEPHONE ENCOUNTER
----- Message from Garret Leon MD sent at 10/12/2020  4:26 PM CDT -----  Early Nov  ----- Message -----  From: Sam Resendez  Sent: 10/12/2020   1:18 PM CDT  To: Ni Lawson, Brigid Isaac, JUAN, #    When doing the call list I spoke with Ms. Moreno and she said that she is quarantined until 10/23/2020. When does she need to be rescheduled to, she is a post-op appointment.

## 2020-11-05 ENCOUNTER — TELEPHONE (OUTPATIENT)
Dept: OTOLARYNGOLOGY | Facility: CLINIC | Age: 25
End: 2020-11-05

## 2020-11-05 NOTE — TELEPHONE ENCOUNTER
11/05/2020, 1423 - Patient telephoned per this staff member [phone] with inquiry if she had forgotten her clinical appointment with Dr. Leon at 2:00 P.M.  Patient apologized and stated she was in quarantine until Wednesday, November 18, 2020.  Patient agreed to reschedule clinical appointment Thursday, December 3, 2020 with patient opting for 8:00 A.M.  Patient also in agreement to be placed on Wait List for earlier clinical appointment date should a cancellation arise.

## 2020-11-06 ENCOUNTER — PATIENT MESSAGE (OUTPATIENT)
Dept: OBSTETRICS AND GYNECOLOGY | Facility: CLINIC | Age: 25
End: 2020-11-06

## 2020-11-09 RX ORDER — FLUCONAZOLE 150 MG/1
150 TABLET ORAL
Qty: 2 TABLET | Refills: 0 | OUTPATIENT
Start: 2020-11-09 | End: 2021-03-15

## 2020-11-09 RX ORDER — METRONIDAZOLE 500 MG/1
500 TABLET ORAL 2 TIMES DAILY
Qty: 10 TABLET | Refills: 0 | Status: SHIPPED | OUTPATIENT
Start: 2020-11-09 | End: 2020-11-14

## 2020-12-10 ENCOUNTER — TELEPHONE (OUTPATIENT)
Dept: OTOLARYNGOLOGY | Facility: CLINIC | Age: 25
End: 2020-12-10

## 2020-12-10 NOTE — TELEPHONE ENCOUNTER
12/10/2020, 1131 Patient telephoned per this staff member [phone].  Zero answer.  Voice message submitted on patient's self identified answering machine with request to contact office to reschedule missed clinical appointment on Thursday, December 3, 2020 at 8:00 A.M.  
no

## 2021-06-04 ENCOUNTER — HOSPITAL ENCOUNTER (EMERGENCY)
Facility: HOSPITAL | Age: 26
Discharge: HOME | End: 2021-06-04
Attending: STUDENT IN AN ORGANIZED HEALTH CARE EDUCATION/TRAINING PROGRAM | Admitting: STUDENT IN AN ORGANIZED HEALTH CARE EDUCATION/TRAINING PROGRAM

## 2021-06-04 ENCOUNTER — APPOINTMENT (OUTPATIENT)
Dept: GENERAL RADIOLOGY | Facility: HOSPITAL | Age: 26
End: 2021-06-04

## 2021-06-04 VITALS
HEIGHT: 65 IN | WEIGHT: 139.6 LBS | HEART RATE: 82 BPM | RESPIRATION RATE: 18 BRPM | SYSTOLIC BLOOD PRESSURE: 118 MMHG | DIASTOLIC BLOOD PRESSURE: 72 MMHG | OXYGEN SATURATION: 100 % | BODY MASS INDEX: 23.26 KG/M2 | TEMPERATURE: 96.2 F

## 2021-06-04 DIAGNOSIS — F41.9 ANXIETY: ICD-10-CM

## 2021-06-04 DIAGNOSIS — R07.9 CHEST PAIN, UNSPECIFIED TYPE: Primary | ICD-10-CM

## 2021-06-04 LAB
ALBUMIN SERPL-MCNC: 4.7 G/DL (ref 3.5–5.2)
ALBUMIN/GLOB SERPL: 1.5 G/DL
ALP SERPL-CCNC: 80 U/L (ref 39–117)
ALT SERPL W P-5'-P-CCNC: 11 U/L (ref 1–33)
ANION GAP SERPL CALCULATED.3IONS-SCNC: 7 MMOL/L (ref 5–15)
AST SERPL-CCNC: 13 U/L (ref 1–32)
BASOPHILS # BLD AUTO: 0.03 10*3/MM3 (ref 0–0.2)
BASOPHILS NFR BLD AUTO: 0.3 % (ref 0–1.5)
BILIRUB SERPL-MCNC: 0.5 MG/DL (ref 0–1.2)
BUN SERPL-MCNC: 12 MG/DL (ref 6–20)
BUN/CREAT SERPL: 13.8 (ref 7–25)
CALCIUM SPEC-SCNC: 10.1 MG/DL (ref 8.6–10.5)
CHLORIDE SERPL-SCNC: 104 MMOL/L (ref 98–107)
CO2 SERPL-SCNC: 25 MMOL/L (ref 22–29)
CREAT SERPL-MCNC: 0.87 MG/DL (ref 0.57–1)
DEPRECATED RDW RBC AUTO: 38.8 FL (ref 37–54)
EOSINOPHIL # BLD AUTO: 0.2 10*3/MM3 (ref 0–0.4)
EOSINOPHIL NFR BLD AUTO: 2.2 % (ref 0.3–6.2)
ERYTHROCYTE [DISTWIDTH] IN BLOOD BY AUTOMATED COUNT: 12.7 % (ref 12.3–15.4)
GFR SERPL CREATININE-BSD FRML MDRD: 80 ML/MIN/1.73
GLOBULIN UR ELPH-MCNC: 3.1 GM/DL
GLUCOSE SERPL-MCNC: 88 MG/DL (ref 65–99)
HCT VFR BLD AUTO: 45.1 % (ref 34–46.6)
HGB BLD-MCNC: 14.8 G/DL (ref 12–15.9)
HOLD SPECIMEN: NORMAL
HOLD SPECIMEN: NORMAL
IMM GRANULOCYTES # BLD AUTO: 0.01 10*3/MM3 (ref 0–0.05)
IMM GRANULOCYTES NFR BLD AUTO: 0.1 % (ref 0–0.5)
LYMPHOCYTES # BLD AUTO: 3.59 10*3/MM3 (ref 0.7–3.1)
LYMPHOCYTES NFR BLD AUTO: 39.4 % (ref 19.6–45.3)
MCH RBC QN AUTO: 27.7 PG (ref 26.6–33)
MCHC RBC AUTO-ENTMCNC: 32.8 G/DL (ref 31.5–35.7)
MCV RBC AUTO: 84.3 FL (ref 79–97)
MONOCYTES # BLD AUTO: 0.45 10*3/MM3 (ref 0.1–0.9)
MONOCYTES NFR BLD AUTO: 4.9 % (ref 5–12)
NEUTROPHILS NFR BLD AUTO: 4.83 10*3/MM3 (ref 1.7–7)
NEUTROPHILS NFR BLD AUTO: 53.1 % (ref 42.7–76)
NRBC BLD AUTO-RTO: 0 /100 WBC (ref 0–0.2)
NT-PROBNP SERPL-MCNC: <5 PG/ML (ref 0–450)
PLATELET # BLD AUTO: 278 10*3/MM3 (ref 140–450)
PMV BLD AUTO: 11.4 FL (ref 6–12)
POTASSIUM SERPL-SCNC: 4 MMOL/L (ref 3.5–5.2)
PROT SERPL-MCNC: 7.8 G/DL (ref 6–8.5)
RBC # BLD AUTO: 5.35 10*6/MM3 (ref 3.77–5.28)
SODIUM SERPL-SCNC: 136 MMOL/L (ref 136–145)
TROPONIN T SERPL-MCNC: <0.01 NG/ML (ref 0–0.03)
WBC # BLD AUTO: 9.11 10*3/MM3 (ref 3.4–10.8)
WHOLE BLOOD HOLD SPECIMEN: NORMAL
WHOLE BLOOD HOLD SPECIMEN: NORMAL

## 2021-06-04 PROCEDURE — 93010 ELECTROCARDIOGRAM REPORT: CPT | Performed by: INTERNAL MEDICINE

## 2021-06-04 PROCEDURE — 93005 ELECTROCARDIOGRAM TRACING: CPT

## 2021-06-04 PROCEDURE — 80053 COMPREHEN METABOLIC PANEL: CPT | Performed by: STUDENT IN AN ORGANIZED HEALTH CARE EDUCATION/TRAINING PROGRAM

## 2021-06-04 PROCEDURE — 93005 ELECTROCARDIOGRAM TRACING: CPT | Performed by: STUDENT IN AN ORGANIZED HEALTH CARE EDUCATION/TRAINING PROGRAM

## 2021-06-04 PROCEDURE — 85025 COMPLETE CBC W/AUTO DIFF WBC: CPT | Performed by: STUDENT IN AN ORGANIZED HEALTH CARE EDUCATION/TRAINING PROGRAM

## 2021-06-04 PROCEDURE — 83880 ASSAY OF NATRIURETIC PEPTIDE: CPT | Performed by: STUDENT IN AN ORGANIZED HEALTH CARE EDUCATION/TRAINING PROGRAM

## 2021-06-04 PROCEDURE — 71045 X-RAY EXAM CHEST 1 VIEW: CPT

## 2021-06-04 PROCEDURE — 99283 EMERGENCY DEPT VISIT LOW MDM: CPT

## 2021-06-04 PROCEDURE — 84484 ASSAY OF TROPONIN QUANT: CPT | Performed by: STUDENT IN AN ORGANIZED HEALTH CARE EDUCATION/TRAINING PROGRAM

## 2021-06-04 RX ORDER — ASPIRIN 81 MG/1
324 TABLET, CHEWABLE ORAL ONCE
Status: COMPLETED | OUTPATIENT
Start: 2021-06-04 | End: 2021-06-04

## 2021-06-04 RX ORDER — SODIUM CHLORIDE 0.9 % (FLUSH) 0.9 %
10 SYRINGE (ML) INJECTION AS NEEDED
Status: DISCONTINUED | OUTPATIENT
Start: 2021-06-04 | End: 2021-06-04 | Stop reason: HOSPADM

## 2021-06-04 RX ADMIN — ASPIRIN 324 MG: 81 TABLET, CHEWABLE ORAL at 14:00

## 2021-06-04 NOTE — ED PROVIDER NOTES
"Subjective   26-year-old female comes to the ER chief complaint of \"feeling odd\". Patient woke up this morning feeling under the weather. She dropped her daughter off at school and went to work. While at work, patient realized that she does not remember anything after she dropped her daughter off. She became nervous. She developed a chest pressure that radiated to her back. She also endorses shortness of breath. She is a little bit nauseated. She attempted to eat and drink something earlier today, but did not have much of an appetite.      History provided by:  Patient   used: No        Review of Systems   Constitutional: Positive for appetite change. Negative for chills and fever.   HENT: Negative for congestion and rhinorrhea.    Respiratory: Positive for shortness of breath. Negative for cough, chest tightness and wheezing.    Cardiovascular: Positive for chest pain. Negative for palpitations and leg swelling.   Gastrointestinal: Positive for nausea. Negative for abdominal pain and vomiting.   Genitourinary: Negative for dysuria and flank pain.   Skin: Negative for color change and rash.   Neurological: Negative for dizziness and headaches.   Psychiatric/Behavioral: Positive for confusion. Negative for agitation. The patient is nervous/anxious.        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; sent for evaluation          No Known Allergies    Past Surgical History:   Procedure Laterality Date   • ADENOIDECTOMY     • TONSILLECTOMY     • WISDOM TOOTH EXTRACTION         Family History   Problem Relation Age of Onset   • Breast cancer Other    • Lung cancer Other    • Stroke Other    • Heart defect Mother    • Drug abuse Mother    • Anxiety " disorder Mother    • Depression Mother    • No Known Problems Paternal Grandmother    • Lung cancer Maternal Grandmother    • Cancer Maternal Grandfather    • Hypotension Maternal Grandfather    • No Known Problems Half-Sister    • No Known Problems Half-Sister    • No Known Problems Half-Brother    • No Known Problems Half-Brother    • No Known Problems Half-Brother    • No Known Problems Half-Brother    • No Known Problems Daughter        Social History     Socioeconomic History   • Marital status: Single     Spouse name: Not on file   • Number of children: Not on file   • Years of education: Not on file   • Highest education level: Not on file   Tobacco Use   • Smoking status: Light Tobacco Smoker     Packs/day: 0.25     Years: 5.00     Pack years: 1.25     Types: Cigarettes   • Smokeless tobacco: Never Used   • Tobacco comment: smokes 5 cigarettes per day   Substance and Sexual Activity   • Alcohol use: No   • Drug use: Never   • Sexual activity: Defer     Partners: Male     Birth control/protection: None           Objective    Vitals:    06/04/21 1400 06/04/21 1401 06/04/21 1430 06/04/21 1455   BP: 106/63  113/59 118/72   BP Location:    Right arm   Patient Position:    Lying   Pulse: 80 70 92 82   Resp:    18   Temp:       TempSrc:       SpO2: 99% 98% 98% 100%   Weight:       Height:           Physical Exam  Vitals and nursing note reviewed.   Constitutional:       General: She is not in acute distress.     Appearance: She is well-developed. She is not ill-appearing, toxic-appearing or diaphoretic.   HENT:      Head: Normocephalic.      Right Ear: External ear normal.      Left Ear: External ear normal.   Eyes:      Extraocular Movements: Extraocular movements intact.   Cardiovascular:      Rate and Rhythm: Normal rate and regular rhythm.   Pulmonary:      Effort: Pulmonary effort is normal. No accessory muscle usage or respiratory distress.      Breath sounds: No decreased breath sounds or wheezing.   Chest:       Chest wall: No tenderness.   Abdominal:      General: Bowel sounds are normal.      Palpations: Abdomen is soft.      Tenderness: There is no abdominal tenderness (deep palpation).   Skin:     General: Skin is warm and dry.      Capillary Refill: Capillary refill takes less than 2 seconds.   Neurological:      Mental Status: She is alert and oriented to person, place, and time. Mental status is at baseline. She is not disoriented.      Cranial Nerves: No cranial nerve deficit.      Sensory: No sensory deficit.      Motor: No weakness.   Psychiatric:         Mood and Affect: Mood is anxious.         Behavior: Behavior normal.         ECG 12 Lead      Date/Time: 6/4/2021 2:44 PM  Performed by: Yariel Mcneil MD  Authorized by: Yariel Mcneil MD   Interpreted by physician  Rhythm: sinus rhythm  Rate: normal  QRS axis: normal  Conduction: incomplete RBBB  ST segment elevation noted on lead: none.  T wave depression noted on lead: none.  Clinical impression: abnormal ECG                 ED Course      Results for orders placed or performed during the hospital encounter of 06/04/21   Troponin    Specimen: Blood   Result Value Ref Range    Troponin T <0.010 0.000 - 0.030 ng/mL   Comprehensive Metabolic Panel    Specimen: Blood   Result Value Ref Range    Glucose 88 65 - 99 mg/dL    BUN 12 6 - 20 mg/dL    Creatinine 0.87 0.57 - 1.00 mg/dL    Sodium 136 136 - 145 mmol/L    Potassium 4.0 3.5 - 5.2 mmol/L    Chloride 104 98 - 107 mmol/L    CO2 25.0 22.0 - 29.0 mmol/L    Calcium 10.1 8.6 - 10.5 mg/dL    Total Protein 7.8 6.0 - 8.5 g/dL    Albumin 4.70 3.50 - 5.20 g/dL    ALT (SGPT) 11 1 - 33 U/L    AST (SGOT) 13 1 - 32 U/L    Alkaline Phosphatase 80 39 - 117 U/L    Total Bilirubin 0.5 0.0 - 1.2 mg/dL    eGFR Non African Amer 80 >60 mL/min/1.73    Globulin 3.1 gm/dL    A/G Ratio 1.5 g/dL    BUN/Creatinine Ratio 13.8 7.0 - 25.0    Anion Gap 7.0 5.0 - 15.0 mmol/L   BNP    Specimen: Blood   Result Value Ref Range     proBNP <5.0 0.0 - 450.0 pg/mL   CBC Auto Differential    Specimen: Blood   Result Value Ref Range    WBC 9.11 3.40 - 10.80 10*3/mm3    RBC 5.35 (H) 3.77 - 5.28 10*6/mm3    Hemoglobin 14.8 12.0 - 15.9 g/dL    Hematocrit 45.1 34.0 - 46.6 %    MCV 84.3 79.0 - 97.0 fL    MCH 27.7 26.6 - 33.0 pg    MCHC 32.8 31.5 - 35.7 g/dL    RDW 12.7 12.3 - 15.4 %    RDW-SD 38.8 37.0 - 54.0 fl    MPV 11.4 6.0 - 12.0 fL    Platelets 278 140 - 450 10*3/mm3    Neutrophil % 53.1 42.7 - 76.0 %    Lymphocyte % 39.4 19.6 - 45.3 %    Monocyte % 4.9 (L) 5.0 - 12.0 %    Eosinophil % 2.2 0.3 - 6.2 %    Basophil % 0.3 0.0 - 1.5 %    Immature Grans % 0.1 0.0 - 0.5 %    Neutrophils, Absolute 4.83 1.70 - 7.00 10*3/mm3    Lymphocytes, Absolute 3.59 (H) 0.70 - 3.10 10*3/mm3    Monocytes, Absolute 0.45 0.10 - 0.90 10*3/mm3    Eosinophils, Absolute 0.20 0.00 - 0.40 10*3/mm3    Basophils, Absolute 0.03 0.00 - 0.20 10*3/mm3    Immature Grans, Absolute 0.01 0.00 - 0.05 10*3/mm3    nRBC 0.0 0.0 - 0.2 /100 WBC   Light Blue Top   Result Value Ref Range    Extra Tube hold for add-on    Green Top (Gel)   Result Value Ref Range    Extra Tube Hold for add-ons.    Lavender Top   Result Value Ref Range    Extra Tube hold for add-on    Gold Top - SST   Result Value Ref Range    Extra Tube Hold for add-ons.                                           MDM  Number of Diagnoses or Management Options  Anxiety: new and requires workup  Chest pain, unspecified type: new and requires workup  Diagnosis management comments: Vital signs are stable, afebrile.  Labs are unremarkable.  Patient able to ambulate without difficulty.  Chest x-ray shows no acute cardiopulmonary processes.  On reevaluation, patient is alert and resting comfortably. I discussed the results of the emergency department evaluation.  I recommended primary care follow-up.  Return precautions discussed.      Final diagnoses:   Chest pain, unspecified type   Anxiety       ED Disposition  ED Disposition     ED  Disposition Condition Comment    Discharge Stable           [facility]  [address] [place] [address] [phone]  Schedule an appointment as soon as possible for a visit in 2 days  ER follow up         Medication List      No changes were made to your prescriptions during this visit.          Yariel Mcneil MD  06/04/21 3736

## 2021-06-04 NOTE — ED NOTES
06/04/21 1505   Respiratory WDL   Respiratory WDL WDL   Breath Sounds   Breath Sounds All Fields   All Lung Fields Breath Sounds equal bilaterally;clear   Cardiac WDL   Cardiac WDL WDL   Cognitive/Neuro/Behavioral WDL   Level of Consciousness Alert   Cognitive/Neuro/Behavioral WDL all   Orientation oriented x 4   Speech clear   Mood/Behavior anxious   ECG   Rhythm normal sinus rhythm   Gastrointestinal WDL   Gastrointestinal WDL WDL   Genitourinary WDL   Genitourinary WDL WDL   Skin WDL   Skin WDL WDL   Safety WDL   Safety WDL WDL        Carol Castro RN  06/04/21 1504

## 2021-06-05 LAB
QT INTERVAL: 344 MS
QTC INTERVAL: 396 MS

## 2021-07-27 PROCEDURE — 87086 URINE CULTURE/COLONY COUNT: CPT | Performed by: NURSE PRACTITIONER

## 2021-10-11 RX ORDER — VALACYCLOVIR HYDROCHLORIDE 500 MG/1
TABLET, FILM COATED ORAL
Qty: 90 TABLET | Refills: 1 | OUTPATIENT
Start: 2021-10-11

## 2022-01-19 PROBLEM — R00.2 PALPITATIONS: Status: ACTIVE | Noted: 2022-01-19

## 2022-01-19 PROBLEM — Z20.822 EXPOSURE TO COVID-19 VIRUS: Status: ACTIVE | Noted: 2022-01-19

## 2022-01-19 PROBLEM — R55 SYNCOPE AND COLLAPSE: Status: ACTIVE | Noted: 2021-06-05

## 2022-01-19 PROBLEM — I49.3 PVC (PREMATURE VENTRICULAR CONTRACTION): Status: ACTIVE | Noted: 2022-01-19

## 2022-01-19 PROCEDURE — U0003 INFECTIOUS AGENT DETECTION BY NUCLEIC ACID (DNA OR RNA); SEVERE ACUTE RESPIRATORY SYNDROME CORONAVIRUS 2 (SARS-COV-2) (CORONAVIRUS DISEASE [COVID-19]), AMPLIFIED PROBE TECHNIQUE, MAKING USE OF HIGH THROUGHPUT TECHNOLOGIES AS DESCRIBED BY CMS-2020-01-R: HCPCS | Performed by: NURSE PRACTITIONER

## 2022-01-31 DIAGNOSIS — B00.1 COLD SORE: Primary | ICD-10-CM

## 2022-01-31 DIAGNOSIS — B00.1 RECURRENT COLD SORES: ICD-10-CM

## 2022-01-31 RX ORDER — VALACYCLOVIR HYDROCHLORIDE 1 G/1
2000 TABLET, FILM COATED ORAL 2 TIMES DAILY
Qty: 4 TABLET | Refills: 11 | Status: SHIPPED | OUTPATIENT
Start: 2022-01-31 | End: 2022-02-01

## 2022-01-31 RX ORDER — VALACYCLOVIR HYDROCHLORIDE 500 MG/1
500 TABLET, FILM COATED ORAL DAILY
Qty: 30 TABLET | Refills: 0 | Status: SHIPPED | OUTPATIENT
Start: 2022-01-31 | End: 2022-01-31

## 2022-01-31 NOTE — PROGRESS NOTES
Cancelled script for 500mg daily #30 with no refills as that is not the correct prescription.    Script sent for Valtrex 2g BID x1 day with 11 refills.    Haylee Meredith MD  1/31/2022  09:11 CST

## 2022-05-12 PROCEDURE — 87510 GARDNER VAG DNA DIR PROBE: CPT | Performed by: NURSE PRACTITIONER

## 2022-05-12 PROCEDURE — 87480 CANDIDA DNA DIR PROBE: CPT | Performed by: NURSE PRACTITIONER

## 2022-05-12 PROCEDURE — 87660 TRICHOMONAS VAGIN DIR PROBE: CPT | Performed by: NURSE PRACTITIONER

## 2022-06-13 ENCOUNTER — HOSPITAL ENCOUNTER (EMERGENCY)
Facility: HOSPITAL | Age: 27
Discharge: HOME | End: 2022-06-13
Attending: EMERGENCY MEDICINE | Admitting: EMERGENCY MEDICINE

## 2022-06-13 VITALS
BODY MASS INDEX: 23.95 KG/M2 | WEIGHT: 149 LBS | HEIGHT: 66 IN | OXYGEN SATURATION: 100 % | TEMPERATURE: 98.1 F | HEART RATE: 84 BPM | RESPIRATION RATE: 18 BRPM | SYSTOLIC BLOOD PRESSURE: 111 MMHG | DIASTOLIC BLOOD PRESSURE: 76 MMHG

## 2022-06-13 DIAGNOSIS — S46.811A STRAIN OF RIGHT TRAPEZIUS MUSCLE, INITIAL ENCOUNTER: Primary | ICD-10-CM

## 2022-06-13 PROCEDURE — 99283 EMERGENCY DEPT VISIT LOW MDM: CPT

## 2022-06-13 RX ORDER — CYCLOBENZAPRINE HCL 10 MG
10 TABLET ORAL ONCE
Status: COMPLETED | OUTPATIENT
Start: 2022-06-13 | End: 2022-06-13

## 2022-06-13 RX ORDER — LIDOCAINE 50 MG/G
1 PATCH TOPICAL EVERY 24 HOURS
Qty: 6 PATCH | Refills: 0 | OUTPATIENT
Start: 2022-06-13 | End: 2022-07-20

## 2022-06-13 RX ORDER — NAPROXEN 500 MG/1
500 TABLET ORAL ONCE
Status: COMPLETED | OUTPATIENT
Start: 2022-06-13 | End: 2022-06-13

## 2022-06-13 RX ORDER — LIDOCAINE 50 MG/G
2 PATCH TOPICAL ONCE
Status: DISCONTINUED | OUTPATIENT
Start: 2022-06-13 | End: 2022-06-13 | Stop reason: HOSPADM

## 2022-06-13 RX ORDER — NAPROXEN 500 MG/1
500 TABLET ORAL 2 TIMES DAILY PRN
Qty: 10 TABLET | Refills: 0 | OUTPATIENT
Start: 2022-06-13 | End: 2022-07-20

## 2022-06-13 RX ORDER — CYCLOBENZAPRINE HCL 10 MG
10 TABLET ORAL 3 TIMES DAILY PRN
Qty: 15 TABLET | Refills: 0 | Status: SHIPPED | OUTPATIENT
Start: 2022-06-13 | End: 2022-06-18

## 2022-06-13 RX ADMIN — LIDOCAINE 2 PATCH: 50 PATCH TOPICAL at 01:44

## 2022-06-13 RX ADMIN — NAPROXEN 500 MG: 500 TABLET ORAL at 01:44

## 2022-06-13 RX ADMIN — CYCLOBENZAPRINE HYDROCHLORIDE 10 MG: 10 TABLET, FILM COATED ORAL at 01:44

## 2022-06-13 NOTE — ED PROVIDER NOTES
Subjective   27-year-old female presents to the emergency department with complaint of right shoulder pain due to a work-related injury.  She reports she was lifting a large box awkwardly away from her body when she felt a pull in the shoulder.  She reports she went back to work with the symptoms worsened progressively over the this shift to the point where she was having stiffness in her right neck and pain radiating into the shoulder and upper back due to the discomfort.  Denies any numbness, tingling or weakness. Right hand dominate.     Family history, surgical history, social history, current medications and allergies are reviewed with the patient and triage documentation and vitals are reviewed.      History provided by:  Patient   used: No        Review of Systems   Constitutional: Negative for chills and fever.   HENT: Negative for congestion and sore throat.    Eyes: Negative for photophobia and visual disturbance.   Respiratory: Negative for cough, shortness of breath and wheezing.    Cardiovascular: Negative for chest pain, palpitations and leg swelling.   Gastrointestinal: Negative for abdominal pain, diarrhea, nausea and vomiting.   Endocrine: Negative for polydipsia, polyphagia and polyuria.   Genitourinary: Negative for dysuria, frequency and urgency.   Musculoskeletal: Positive for arthralgias, back pain, neck pain and neck stiffness. Negative for joint swelling.   Skin: Negative for wound.   Allergic/Immunologic: Negative.    Neurological: Negative for weakness, light-headedness, numbness and headaches.   Hematological: Negative.    Psychiatric/Behavioral: Negative.        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; sent for  evaluation          No Known Allergies    Past Surgical History:   Procedure Laterality Date   • ADENOIDECTOMY     • TONSILLECTOMY     • WISDOM TOOTH EXTRACTION         Family History   Problem Relation Age of Onset   • Breast cancer Other    • Lung cancer Other    • Stroke Other    • Heart defect Mother    • Drug abuse Mother    • Anxiety disorder Mother    • Depression Mother    • No Known Problems Paternal Grandmother    • Lung cancer Maternal Grandmother    • Cancer Maternal Grandfather    • Hypotension Maternal Grandfather    • No Known Problems Half-Sister    • No Known Problems Half-Sister    • No Known Problems Half-Brother    • No Known Problems Half-Brother    • No Known Problems Half-Brother    • No Known Problems Half-Brother    • No Known Problems Daughter        Social History     Socioeconomic History   • Marital status: Single   Tobacco Use   • Smoking status: Light Tobacco Smoker     Packs/day: 0.25     Years: 5.00     Pack years: 1.25     Types: Cigarettes   • Smokeless tobacco: Never Used   • Tobacco comment: smokes 5 cigarettes per day   Substance and Sexual Activity   • Alcohol use: No   • Drug use: Never   • Sexual activity: Defer     Partners: Male     Birth control/protection: None           Objective   Physical Exam  Vitals and nursing note reviewed.   Constitutional:       General: She is not in acute distress.     Appearance: Normal appearance. She is normal weight. She is not ill-appearing, toxic-appearing or diaphoretic.   Cardiovascular:      Rate and Rhythm: Normal rate and regular rhythm.      Pulses: Normal pulses.      Heart sounds: No murmur heard.  Pulmonary:      Effort: Pulmonary effort is normal.      Breath sounds: Normal breath sounds.   Musculoskeletal:         General: Tenderness present. No deformity.      Right shoulder: Tenderness present. No swelling, deformity, bony tenderness or crepitus. Decreased range of motion. Normal strength. Normal pulse.        Arms:        Cervical back: Normal range of motion and neck supple. Muscular tenderness present. No spinous process tenderness.   Skin:     General: Skin is warm and dry.      Capillary Refill: Capillary refill takes less than 2 seconds.   Neurological:      General: No focal deficit present.      Mental Status: She is alert.         Procedures  none         ED Course    Labs Reviewed - No data to display  No results found.             MDM  Number of Diagnoses or Management Options  Patient Progress  Patient progress: stable    Patient's exam is consistent with trapezius muscle spasm.  She had no trauma.  She has range of motion with tenderness in the muscle and muscle spasm.  Treated symptomatically with some improvement and advised over further treatment over the next couple days.  Neurovascularly intact.  Agreeable to plan and discharge.  Given couple of days off work to treat her symptoms.  Given information on follow-up with occupational health.    Final diagnoses:   Strain of right trapezius muscle, initial encounter       ED Disposition  ED Disposition     ED Disposition   Discharge    Condition   Stable    Comment   --             [facility]  [address] [facility] [address] [phone]             Medication List      New Prescriptions    cyclobenzaprine 10 MG tablet  Commonly known as: FLEXERIL  Take 1 tablet by mouth 3 (Three) Times a Day As Needed for Muscle Spasms for up to 5 days.     lidocaine 5 %  Commonly known as: Lidoderm  Place 1 patch on the skin as directed by provider Daily. Remove & Discard patch within 12 hours or as directed by MD     naproxen 500 MG EC tablet  Commonly known as: EC NAPROSYN  Take 1 tablet by mouth 2 (Two) Times a Day As Needed for Mild Pain .        Stop    azithromycin 250 MG tablet  Commonly known as: ZITHROMAX     brompheniramine-pseudoephedrine-DM 30-2-10 MG/5ML syrup  Commonly known as: Bromfed DM     Droxidopa 200 MG capsule      fludrocortisone 0.1 MG tablet     ipratropium-albuterol  MCG/ACT inhaler  Commonly known as: COMBIVENT RESPIMAT     mupirocin 2 % ointment  Commonly known as: BACTROBAN           Where to Get Your Medications      These medications were sent to [facility]/pharmacy #[id number] - [place], [place] - [address] [place] [address] - [phone]  - [phone] [address] [address]    Phone: [phone]   · cyclobenzaprine 10 MG tablet  · lidocaine 5 %  · naproxen 500 MG EC tablet          Robbi Roy,   06/13/22 0310

## 2022-06-13 NOTE — ED NOTES
"Pt c/o right shoulder pain/injury. Pt states that she was lifting a box at work and her shoulder began \"burning.\" Pt states that she continued to work but after her lunch break the pain had become worse and was radiating up the side of her neck.     "

## 2022-06-13 NOTE — DISCHARGE INSTRUCTIONS
Please return with new or worsening symptoms.  Use the medications provided to help with symptoms and rest.  Follow-up with your companies occupational health physician or the one provided.

## 2022-07-20 PROCEDURE — 87635 SARS-COV-2 COVID-19 AMP PRB: CPT | Performed by: FAMILY MEDICINE

## 2022-09-01 ENCOUNTER — HOSPITAL ENCOUNTER (OUTPATIENT)
Dept: MRI IMAGING | Facility: HOSPITAL | Age: 27
Discharge: HOME | End: 2022-09-01

## 2022-09-01 DIAGNOSIS — S46.911D STRAIN OF RIGHT SHOULDER, SUBSEQUENT ENCOUNTER: ICD-10-CM

## 2022-09-01 DIAGNOSIS — M54.2 NECK PAIN: ICD-10-CM

## 2022-09-01 PROCEDURE — 73221 MRI JOINT UPR EXTREM W/O DYE: CPT

## 2022-09-01 PROCEDURE — 72141 MRI NECK SPINE W/O DYE: CPT

## 2022-09-26 PROCEDURE — 87635 SARS-COV-2 COVID-19 AMP PRB: CPT | Performed by: NURSE PRACTITIONER

## 2022-12-21 PROCEDURE — 87635 SARS-COV-2 COVID-19 AMP PRB: CPT | Performed by: NURSE PRACTITIONER

## 2023-02-16 NOTE — THERAPY EVALUATION
Outpatient Physical Therapy Pelvic Health Initial Evaluation  [facility]     Patient Name: Kristal Moreno  : 1995  MRN: [id number]  Today's Date: 2019        Visit Date: 2019  Visit number:   Recheck: 71  Insurance: pending authorization    Patient Active Problem List   Diagnosis   • Syncopal episodes   • Subchorionic hemorrhage in first trimester        Past Medical History:   Diagnosis Date   • Allergic rhinitis    • Back pain affecting pregnancy in third trimester    • Dysuria    • Encounter for contraceptive management    • Fever    • Fibrocystic disease of breast    • Headache    • Hypotension    • Irregular periods    • Nausea and vomiting    • Neurocardiogenic syncope    • Neurocardiogenic syncope    • Scar    • Unsocialized conduct disorder     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 Back pain affecting pregnancy in third trimester O99.89 646.83    M54.9 724.5             PT Ortho     Row Name 19 1100       Subjective Comments    Subjective Comments  Patient has had back pain throughout pregnancy.  Dull feeling of pain almost always.  Hurts really bad to sit straight upright.  Last 1.5 to 2 months it has worsened.  Sleeps with heating pad which helps with pain.  Warm baths helps too.  Circulation issue due to heart condition.  Since pregnancy, both legs go numb randomly.  Weakening in legs also noted at times.   Random occurence.  Comes/goes.  Pain increases with seated position.  Works at [employer] where she sits long periods. Also drives a lot for position.  Increased pain toward end of day at work.  10 hours plus 5 days per week.  Pain stays in back mostly.  Numbness in legs at same time.  Never really experiences radiating pain.  Sleeps at least 6+ hours per night.  Prolonged walking is hard as well.  Started that she was suppose to have spina bifida when she was born, but never has. But when attempts to stand uprigiht, pain increases in LB. Patient takes care of step son every other week that is the age of 2.  She carries quite a bit which feeds problems with pain.   -SW       Subjective Pain    Able to rate subjective pain?  yes  -SW    Pre-Treatment Pain Level  2  -SW    Post-Treatment Pain Level  2  -SW       Posture/Observations    Posture- WNL  Posture is WNL  -SW    Posture/Observations Comments  R shoulder and IC elevated slightly in static standing position. Transfers and gait unremarkable.  Supine alignment level.    -SW       Quarter Clearing    Quarter Clearing  Lower Quarter Clearing  -SW       DTR- Lower Quarter Clearing    Patellar tendon (L2-4)  2- Normal response  -SW    Achilles tendon (S1-2)  2- Normal response  -SW       Neural Tension Signs- Lower Quarter Clearing    Slump  Bilateral:;Positive radiate to knee  -SW    SLR  Bilateral:;Positive  -SW       Sensory Screen for Light Touch- Lower Quarter Clearing    L1 (inguinal area)  Intact  -SW    L2 (anterior mid thigh)  Intact  -SW    L3 (distal anterior thigh)  Intact  -SW    L4 (medial lower leg/foot)  Intact  -SW    L5 (lateral lower leg/great toe)  Intact  -SW    S1 (bottom of foot)  Intact  -SW       Myotomal Screen- Lower Quarter Clearing    Hip flexion (L2)  WNL;5 (Normal)  -SW    Knee extension (L3)  WNL;5 (Normal)  -SW    Knee flexion (S2)  WNL;5 (Normal)  -SW       Lumbar ROM Screen- Lower Quarter Clearing    Lumbar  Flexion  Normal  -SW    Lumbar Extension  Normal  -SW    Lumbar Lateral Flexion  Normal  -SW    Lumbar Rotation  Normal  -SW       SI/Hip Screen- Lower Quarter Clearing    ASIS compression  Positive  -SW    ASIS distraction  Positive  -SW    Danis's/Bruce's test  Negative  -SW    Posterior thigh sheer  Positive  -SW       Special Tests/Palpation    Special Tests/Palpation  Lumbar/SI  -SW       Lumbosacral Accessory Motions    Lumbosacral Accessory Motions Tested?  Yes  -    PA glide- Sacral base  -- alignment with good spring assessment  -    Innominate rotation  -- symmetrical in supine position  -       Lumbar/SI Special Tests    Slump Test (Neural Tension)  Positive  -    SLR (Neural Tension)  Positive  -SW    DANIS (hip vs. SI Dysfunction)  Negative  -SW    Lumbar/SI Special Tests Comments  Neural tension observed in prone and supine positions with bilateral LE.   -SW       Lumbosacral Palpation    Lumbosacral Palpation?  Yes  -    Lumbosacral Segment  Bilateral:;Tender;Guarded/taut  -      User Key  (r) = Recorded By, (t) = Taken By, (c) = Cosigned By    Initials Name Provider Type    Cristiane Snyder, PT Physical Therapist                     PT Assessment/Plan     Row Name 06/13/19 1300          PT Assessment    Functional Limitations  Performance in leisure activities;Performance in self-care ADL;Performance in work activities;Limitation in home management;Limitations in community activities  -     Impairments  Impaired muscle length;Impaired postural alignment;Pain;Posture;Poor body mechanics  -     Assessment Comments  Patient is a 25 yo female presenting to the clinic with + history of neural tension to BLE.  Posturally in standing, patient shows asymmetry though self corrects when lying down.  She would benefit from skilled therapy to improve nerve glides, postural control, fascial glides and overall daily function as to improve QOL with advancing pregnancy.  -     Rehab  Potential  Good  -SW     Patient/caregiver participated in establishment of treatment plan and goals  Yes  -SW     Patient would benefit from skilled therapy intervention  Yes  -SW        PT Plan    PT Frequency  1x/week  -SW     Predicted Duration of Therapy Intervention (Therapy Eval)  8-10 visits  -     PT Plan Comments  Manual therapy for tissue motility as needed.  Nerve glides to LE and LB. Postural control training.  Stabilization exercises.   -       User Key  (r) = Recorded By, (t) = Taken By, (c) = Cosigned By    Initials Name Provider Type    Cristiane Snyder, PT Physical Therapist            Exercises     Row Name 06/13/19 1100             Subjective Comments    Subjective Comments  Patient has had back pain throughout pregnancy.  Dull feeling of pain almost always.  Hurts really bad to sit straight upright.  Last 1.5 to 2 months it has worsened.  Sleeps with heating pad which helps with pain.  Warm baths helps too.  Circulation issue due to heart condition.  Since pregnancy, both legs go numb randomly.  Weakening in legs also noted at times.  Random occurence.  Comes/goes.  Pain increases with seated position.  Works at [employer] where she sits long periods. Also drives a lot for position.  Increased pain toward end of day at work.  10 hours plus 5 days per week.  Pain stays in back mostly.  Numbness in legs at same time.  Never really experiences radiating pain.  Sleeps at least 6+ hours per night.  Prolonged walking is hard as well.  Started that she was suppose to have spina bifida when she was born, but never has. But when attempts to stand uprigiht, pain increases in LB. Patient takes care of step son every other week that is the age of 2.  She carries quite a bit which feeds problems with pain.   -SW         Subjective Pain    Able to rate subjective pain?  yes  -SW      Pre-Treatment Pain Level  2  -SW      Post-Treatment Pain Level  2  -SW         Exercise 1     Exercise Name 1  angry cat stretch   -SW      Reps 1  10  -SW      Time 1  5 sec  -SW         Exercise 2    Exercise Name 2  piriformis stretch   -SW      Reps 2  3  -SW      Time 2  30 sec  -SW         Exercise 3    Exercise Name 3  hamstring stretch   -SW      Reps 3  3  -SW      Time 3  30 sec  -SW        User Key  (r) = Recorded By, (t) = Taken By, (c) = Cosigned By    Initials Name Provider Type    SW Cristiane Wynn, PT Physical Therapist                      PT OP Goals     Row Name 06/13/19 1300          PT Short Term Goals    STG Date to Achieve  07/11/19  -     STG 1  patient to be independent with HEP  -     STG 1 Progress  New  -     STG 2  patient to have improved neural tension such that leg extension in seated position doesn't elicit pain  -     STG 2 Progress  New  -     STG 3  patient to show proper floor to waist t/f to show pelvic neutral position and lifting with legs vs back,  -     STG 3 Progress  New  -     STG 4  Patient to report pain in LB to be intermittent vs constant as reported in evaluation with use of HEP and postural control exercises.   -     STG 4 Progress  New  -     STG 5  Patient to demo increased postural awareness with seated position to show dec sacral sit and more upright positioning.  -     STG 5 Progress  New  -        Long Term Goals    LTG Date to Achieve  09/27/19  -     LTG 1  Subjectively improve 70% better overall  -     LTG 1 Progress  New  -     LTG 2  No missed work days due to pain  -     LTG 2 Progress  New  -     LTG 3  Patient to show Lumbar ROM in all planes without inc pain for performance.   -     LTG 3 Progress  New  -     LTG 4  Mod Oswestry score of 15% or less indicating improved function with ADL  -     LTG 4 Progress  New  -        Time Calculation    PT Goal Re-Cert Due Date  07/11/19  -       User Key  (r) = Recorded By, (t) = Taken By, (c) = Cosigned By    Initials Name Provider Type    Cristiane Snyder  Abran, PT Physical Therapist          Therapy Education  Given: HEP  Program: New  How Provided: Verbal, Demonstration  Provided to: Patient  Level of Understanding: Teach back education performed, Verbalized, Demonstrated     Outcome Measure Options: Modifed Owestry  Modified Oswestry  Modified Oswestry Score/Comments: 22/50=44%      Time Calculation:   Start Time: 1115  Stop Time: 1212  Time Calculation (min): 57 min  Therapy Charges for Today     Code Description Service Date Service Provider Modifiers Qty    61706420836 HC PT EVAL MOD COMPLEXITY 4 6/13/2019 Cristiane Wynn, PT GP 1          PT G-Codes  Outcome Measure Options: Modifed Owestry  Modified Oswestry Score/Comments: 22/50=44%       Cristiane Wynn, PT  6/13/2019      show

## 2023-03-13 ENCOUNTER — HOSPITAL ENCOUNTER (EMERGENCY)
Facility: HOSPITAL | Age: 28
Discharge: HOME | End: 2023-03-13
Attending: FAMILY MEDICINE | Admitting: FAMILY MEDICINE
Payer: OTHER MISCELLANEOUS

## 2023-03-13 ENCOUNTER — APPOINTMENT (OUTPATIENT)
Dept: GENERAL RADIOLOGY | Facility: HOSPITAL | Age: 28
End: 2023-03-13
Payer: OTHER MISCELLANEOUS

## 2023-03-13 VITALS
TEMPERATURE: 98.4 F | OXYGEN SATURATION: 99 % | SYSTOLIC BLOOD PRESSURE: 106 MMHG | DIASTOLIC BLOOD PRESSURE: 63 MMHG | RESPIRATION RATE: 18 BRPM | WEIGHT: 153 LBS | HEIGHT: 66 IN | HEART RATE: 60 BPM | BODY MASS INDEX: 24.59 KG/M2

## 2023-03-13 DIAGNOSIS — S43.401A SPRAIN OF RIGHT SHOULDER, UNSPECIFIED SHOULDER SPRAIN TYPE, INITIAL ENCOUNTER: Primary | ICD-10-CM

## 2023-03-13 PROCEDURE — 99283 EMERGENCY DEPT VISIT LOW MDM: CPT

## 2023-03-13 PROCEDURE — 96372 THER/PROPH/DIAG INJ SC/IM: CPT

## 2023-03-13 PROCEDURE — 63710000001 ONDANSETRON ODT 4 MG TABLET DISPERSIBLE: Performed by: FAMILY MEDICINE

## 2023-03-13 PROCEDURE — 25010000002 KETOROLAC TROMETHAMINE PER 15 MG: Performed by: FAMILY MEDICINE

## 2023-03-13 PROCEDURE — 73030 X-RAY EXAM OF SHOULDER: CPT

## 2023-03-13 RX ORDER — CYCLOBENZAPRINE HCL 10 MG
10 TABLET ORAL 3 TIMES DAILY PRN
Qty: 21 TABLET | Refills: 0 | Status: SHIPPED | OUTPATIENT
Start: 2023-03-13

## 2023-03-13 RX ORDER — MELOXICAM 15 MG/1
15 TABLET ORAL DAILY
Qty: 30 TABLET | Refills: 0 | Status: SHIPPED | OUTPATIENT
Start: 2023-03-13 | End: 2023-03-24

## 2023-03-13 RX ORDER — HYDROCODONE BITARTRATE AND ACETAMINOPHEN 5; 325 MG/1; MG/1
1 TABLET ORAL ONCE
Status: COMPLETED | OUTPATIENT
Start: 2023-03-13 | End: 2023-03-13

## 2023-03-13 RX ORDER — KETOROLAC TROMETHAMINE 30 MG/ML
30 INJECTION, SOLUTION INTRAMUSCULAR; INTRAVENOUS ONCE
Status: COMPLETED | OUTPATIENT
Start: 2023-03-13 | End: 2023-03-13

## 2023-03-13 RX ORDER — ONDANSETRON 4 MG/1
4 TABLET, ORALLY DISINTEGRATING ORAL ONCE
Status: COMPLETED | OUTPATIENT
Start: 2023-03-13 | End: 2023-03-13

## 2023-03-13 RX ADMIN — KETOROLAC TROMETHAMINE 30 MG: 30 INJECTION, SOLUTION INTRAMUSCULAR; INTRAVENOUS at 03:49

## 2023-03-13 RX ADMIN — HYDROCODONE BITARTRATE AND ACETAMINOPHEN 1 TABLET: 5; 325 TABLET ORAL at 02:49

## 2023-03-13 RX ADMIN — ONDANSETRON 4 MG: 4 TABLET, ORALLY DISINTEGRATING ORAL at 03:05

## 2023-03-13 NOTE — ED PROVIDER NOTES
Subjective   History of Present Illness  Patient presents to the emergency department with right shoulder pain after lifting a 55 pound box around 10:30 PM tonight at work.  She states that she had a similar injury that involved her right shoulder this past June that eventually improved with physical therapy.    Shoulder Injury  Location:  Right aching  Quality:  Aching  Severity:  Moderate  Onset quality:  Sudden  Duration:  4 hours  Timing:  Constant  Progression:  Waxing and waning  Chronicity:  Recurrent  Relieved by:  Rest  Worsened by:  Movement  Associated symptoms: no abdominal pain, no chest pain, no congestion, no cough, no diarrhea, no ear pain, no fatigue, no fever, no headaches, no myalgias, no nausea, no rash, no rhinorrhea, no shortness of breath, no sore throat, no vomiting and no wheezing        Review of Systems   Constitutional: Negative for appetite change, chills, diaphoresis, fatigue and fever.   HENT: Negative for congestion, ear discharge, ear pain, nosebleeds, rhinorrhea, sinus pressure, sore throat and trouble swallowing.    Eyes: Negative for discharge and redness.   Respiratory: Negative for apnea, cough, chest tightness, shortness of breath and wheezing.    Cardiovascular: Negative for chest pain.   Gastrointestinal: Negative for abdominal pain, diarrhea, nausea and vomiting.   Endocrine: Negative for polyuria.   Genitourinary: Negative for dysuria, frequency and urgency.   Musculoskeletal: Positive for arthralgias. Negative for myalgias and neck pain.   Skin: Negative for color change and rash.   Allergic/Immunologic: Negative for immunocompromised state.   Neurological: Negative for dizziness, seizures, syncope, weakness, light-headedness and headaches.   Hematological: Negative for adenopathy. Does not bruise/bleed easily.   Psychiatric/Behavioral: Negative for behavioral problems and confusion.   All other systems reviewed and are negative.      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; sent for evaluation          No Known Allergies    Past Surgical History:   Procedure Laterality Date   • ADENOIDECTOMY     • TONSILLECTOMY     • WISDOM TOOTH EXTRACTION         Family History   Problem Relation Age of Onset   • Breast cancer Other    • Lung cancer Other    • Stroke Other    • Heart defect Mother    • Drug abuse Mother    • Anxiety disorder Mother    • Depression Mother    • No Known Problems Paternal Grandmother    • Lung cancer Maternal Grandmother    • Cancer Maternal Grandfather    • Hypotension Maternal Grandfather    • No Known Problems Half-Sister    • No Known Problems Half-Sister    • No Known Problems Half-Brother    • No Known Problems Half-Brother    • No Known Problems Half-Brother    • No Known Problems Half-Brother    • No Known Problems Daughter        Social History     Socioeconomic History   • Marital status: Single   Tobacco Use   • Smoking status: Light Smoker     Packs/day: 0.25     Years: 5.00     Pack years: 1.25     Types: Cigarettes   • Smokeless tobacco: Never   • Tobacco comments:     smokes 5 cigarettes per day   Vaping Use   • Vaping Use: Never used   Substance and Sexual Activity   • Alcohol use: No   • Drug use: Never   • Sexual activity: Defer     Partners: Male     Birth control/protection: None           Objective   Physical Exam  Vitals and nursing note reviewed.   Constitutional:       Appearance: She is well-developed.   HENT:      Head: Normocephalic and atraumatic.      Nose: Nose normal.   Eyes:      General: No scleral icterus.        Right eye: No discharge.         Left eye: No discharge.      Conjunctiva/sclera: Conjunctivae normal.      Pupils: Pupils are equal, round, and reactive to light.    Neck:      Trachea: No tracheal deviation.   Cardiovascular:      Rate and Rhythm: Normal rate and regular rhythm.      Heart sounds: Normal heart sounds. No murmur heard.  Pulmonary:      Effort: Pulmonary effort is normal. No respiratory distress.      Breath sounds: Normal breath sounds. No stridor. No wheezing or rales.   Abdominal:      General: Bowel sounds are normal. There is no distension.      Palpations: Abdomen is soft. There is no mass.      Tenderness: There is no abdominal tenderness. There is no guarding or rebound.   Musculoskeletal:      Right shoulder: Tenderness and bony tenderness present. No swelling or deformity. Decreased range of motion.      Left shoulder: Normal.        Arms:       Cervical back: Normal range of motion and neck supple.   Skin:     General: Skin is warm and dry.      Findings: No erythema or rash.   Neurological:      Mental Status: She is alert and oriented to person, place, and time.      Coordination: Coordination normal.   Psychiatric:         Behavior: Behavior normal.         Thought Content: Thought content normal.         Procedures           ED Course              Labs Reviewed - No data to display    XR Shoulder 2+ View Right    (Results Pending)                                         MDM    Final diagnoses:   Sprain of right shoulder, unspecified shoulder sprain type, initial encounter       ED Disposition  ED Disposition     ED Disposition   Discharge    Condition   Stable    Comment   --             Brianne Velez, YUNG  [address] [facility] [address] [phone]      As needed    Cassie Malhotra, APRN  [address] [place] [id number] [phone]    In 1 week  If no improvement         Medication List      New Prescriptions    cyclobenzaprine 10 MG tablet  Commonly known as: FLEXERIL  Take 1 tablet by mouth 3 (Three) Times a Day As Needed for Muscle Spasms.     meloxicam 15 MG tablet  Commonly known as: MOBIC  Take 1 tablet by mouth  Daily.           Where to Get Your Medications      These medications were sent to [facility]/pharmacy #[id number] - [place], [place] - [address] [place] [address] - [phone]  - [phone] [address] [address]    Phone: [phone]   · cyclobenzaprine 10 MG tablet  · meloxicam 15 MG tablet          Ethan Delgado MD  03/13/23 0354

## 2023-04-03 ENCOUNTER — APPOINTMENT (OUTPATIENT)
Dept: GENERAL RADIOLOGY | Facility: HOSPITAL | Age: 28
End: 2023-04-03
Payer: OTHER MISCELLANEOUS

## 2023-04-03 ENCOUNTER — HOSPITAL ENCOUNTER (EMERGENCY)
Facility: HOSPITAL | Age: 28
Discharge: HOME | End: 2023-04-03
Attending: STUDENT IN AN ORGANIZED HEALTH CARE EDUCATION/TRAINING PROGRAM | Admitting: STUDENT IN AN ORGANIZED HEALTH CARE EDUCATION/TRAINING PROGRAM
Payer: OTHER MISCELLANEOUS

## 2023-04-03 VITALS
RESPIRATION RATE: 20 BRPM | HEART RATE: 82 BPM | HEIGHT: 66 IN | TEMPERATURE: 97.7 F | BODY MASS INDEX: 24.59 KG/M2 | OXYGEN SATURATION: 100 % | SYSTOLIC BLOOD PRESSURE: 113 MMHG | WEIGHT: 153 LBS | DIASTOLIC BLOOD PRESSURE: 54 MMHG

## 2023-04-03 DIAGNOSIS — S49.91XD INJURY OF RIGHT SHOULDER, SUBSEQUENT ENCOUNTER: Primary | ICD-10-CM

## 2023-04-03 PROCEDURE — 73030 X-RAY EXAM OF SHOULDER: CPT

## 2023-04-03 PROCEDURE — 99283 EMERGENCY DEPT VISIT LOW MDM: CPT

## 2023-04-03 RX ORDER — HYDROCODONE BITARTRATE AND ACETAMINOPHEN 5; 325 MG/1; MG/1
1 TABLET ORAL ONCE
Status: COMPLETED | OUTPATIENT
Start: 2023-04-03 | End: 2023-04-03

## 2023-04-03 RX ADMIN — HYDROCODONE BITARTRATE AND ACETAMINOPHEN 1 TABLET: 5; 325 TABLET ORAL at 23:33

## 2023-04-03 NOTE — Clinical Note
[facility] EMERGENCY DEPARTMENT  [address] [place] [id number]  Phone: [phone]    Kristal Moreno was seen and treated in our emergency department on 4/3/2023.  She may return to work on 04/05/2023.         Thank you for choosing [facility].    Maritza Rios MD

## 2023-04-04 NOTE — ED PROVIDER NOTES
Subjective   History of Present Illness  Patient presents with reinjury of right shoulder tonight while at work.  Patient injured her shoulder last June and then again in March of this year.  In March, she was lifting a 55 pound box when her right shoulder popped and then her arm went numb.  Tonight, she was reaching into a box and her arm popped again and her neck went stiff and now she has decreased range of motion secondary to pain.  She states the pain is stabbing and now in the anterior part of her shoulder.        Review of Systems   Constitutional: Negative for activity change and appetite change.   HENT: Negative for congestion and ear pain.    Eyes: Negative for pain and discharge.   Respiratory: Negative for chest tightness and shortness of breath.    Cardiovascular: Negative for chest pain and palpitations.   Gastrointestinal: Negative for abdominal distention and abdominal pain.   Endocrine: Negative for cold intolerance and heat intolerance.   Genitourinary: Negative for difficulty urinating and dysuria.   Musculoskeletal: Positive for arthralgias (right shoulder). Negative for back pain.   Skin: Negative for color change and rash.   Allergic/Immunologic: Negative for environmental allergies and food allergies.   Neurological: Negative for dizziness and headaches.   Hematological: Negative for adenopathy. Does not bruise/bleed easily.   Psychiatric/Behavioral: Negative for agitation and confusion.       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; sent for evaluation          No Known Allergies    Past Surgical History:   Procedure Laterality Date   • ADENOIDECTOMY     • TONSILLECTOMY     • WISDOM TOOTH EXTRACTION         Family History   Problem  Relation Age of Onset   • Breast cancer Other    • Lung cancer Other    • Stroke Other    • Heart defect Mother    • Drug abuse Mother    • Anxiety disorder Mother    • Depression Mother    • No Known Problems Paternal Grandmother    • Lung cancer Maternal Grandmother    • Cancer Maternal Grandfather    • Hypotension Maternal Grandfather    • No Known Problems Half-Sister    • No Known Problems Half-Sister    • No Known Problems Half-Brother    • No Known Problems Half-Brother    • No Known Problems Half-Brother    • No Known Problems Half-Brother    • No Known Problems Daughter        Social History     Socioeconomic History   • Marital status: Single   Tobacco Use   • Smoking status: Light Smoker     Packs/day: 0.25     Years: 5.00     Pack years: 1.25     Types: Cigarettes   • Smokeless tobacco: Never   • Tobacco comments:     smokes 5 cigarettes per day   Vaping Use   • Vaping Use: Never used   Substance and Sexual Activity   • Alcohol use: No   • Drug use: Never   • Sexual activity: Defer     Partners: Male     Birth control/protection: None           Objective   Physical Exam  Vitals and nursing note reviewed.   Constitutional:       Appearance: She is well-developed.   HENT:      Head: Normocephalic and atraumatic.   Eyes:      Pupils: Pupils are equal, round, and reactive to light.   Neck:      Thyroid: No thyromegaly.      Vascular: No JVD.      Trachea: No tracheal deviation.   Cardiovascular:      Rate and Rhythm: Normal rate.      Pulses:           Radial pulses are 2+ on the right side and 2+ on the left side.        Dorsalis pedis pulses are 2+ on the right side and 2+ on the left side.      Heart sounds: Normal heart sounds, S1 normal and S2 normal.   Pulmonary:      Effort: Pulmonary effort is normal.      Breath sounds: Normal breath sounds.   Abdominal:      General: Bowel sounds are normal.   Musculoskeletal:         General: Tenderness (right shoulder) present.      Comments: Unable to lift right  "arm above horizon. With passive ROM, pain when right arm is lifted above horizon in anterior and lateral directions.    Skin:     General: Skin is warm and dry.      Capillary Refill: Capillary refill takes 2 to 3 seconds.   Neurological:      Mental Status: She is alert and oriented to person, place, and time.      GCS: GCS eye subscore is 4. GCS verbal subscore is 5. GCS motor subscore is 6.   Psychiatric:         Speech: Speech normal.         Behavior: Behavior normal.         Thought Content: Thought content normal.         Procedures           ED Course           Vitals:    04/03/23 2136   BP: 113/54   BP Location: Left arm   Patient Position: Sitting   Pulse: 82   Resp: 20   Temp: 97.7 °F (36.5 °C)   TempSrc: Oral   SpO2: 100%   Weight: 69.4 kg (153 lb)   Height: 167.6 cm (66\")      Lab Results (last 24 hours)     ** No results found for the last 24 hours. **         XR Shoulder 2+ View Right    Result Date: 4/3/2023  1. Negative for acute fracture.                                      Medical Decision Making  Patient with chronic shoulder injury with reinjury.  Pain with active and passive range of motion and with palpation.  Suspect soft tissue injury.  Patient is affiliated with [employer] and PCP for this injury.  Referred to follow-up with them for advanced imaging.  Medicated for pain with narcotic pain medicine in department with instructions on ibuprofen and ice and rest.  Work note given for    Injury of right shoulder, subsequent encounter: acute illness or injury  Amount and/or Complexity of Data Reviewed  Radiology: ordered.      Risk  Prescription drug management.          Final diagnoses:   Injury of right shoulder, subsequent encounter       ED Disposition  ED Disposition     ED Disposition   Discharge    Condition   Stable    Comment   --             Brianne Velez, APRN  [address] [facility] [address]  [phone]    Call in 1 day  for follow up         Medication List      No " changes were made to your prescriptions during this visit.       This document has been electronically signed by Maritza Rios MD on April 4, 2023 13:20 CDT    Maritza Rios MD   Part of this note may be an electronic transcription/translation of spoken language to printed text using the Dragon Dictation System.        Maritza Rios MD  04/04/23 9255

## 2023-04-04 NOTE — DISCHARGE INSTRUCTIONS
Call your primary care or your [employer] doctor tomorrow for immediate follow-up.  Use ice on your shoulder 5-6 times a day as directed to see if this helps.  Use ibuprofen 600 mg every 6 hours with food.  Try to move your arm around so it does not get more stiff.  Return to ED as needed.

## 2023-04-27 ENCOUNTER — OUTSIDE FACILITY SERVICE (OUTPATIENT)
Dept: CARDIOLOGY | Facility: CLINIC | Age: 28
End: 2023-04-27
Payer: COMMERCIAL

## 2023-04-27 ENCOUNTER — TRANSCRIBE ORDERS (OUTPATIENT)
Dept: CARDIOLOGY | Facility: CLINIC | Age: 28
End: 2023-04-27
Payer: COMMERCIAL

## 2023-04-27 DIAGNOSIS — R07.9 CHEST PAIN, UNSPECIFIED TYPE: Primary | ICD-10-CM

## 2023-04-27 DIAGNOSIS — R07.9 CHEST PAIN, UNSPECIFIED TYPE: ICD-10-CM

## 2023-04-27 PROCEDURE — 93010 ELECTROCARDIOGRAM REPORT: CPT | Performed by: INTERNAL MEDICINE

## 2023-05-09 ENCOUNTER — HOSPITAL ENCOUNTER (OUTPATIENT)
Dept: ULTRASOUND IMAGING | Facility: HOSPITAL | Age: 28
Discharge: HOME | End: 2023-05-09
Admitting: NURSE PRACTITIONER
Payer: COMMERCIAL

## 2023-05-09 DIAGNOSIS — M79.89 MASS OF SOFT TISSUE: ICD-10-CM

## 2023-05-09 PROCEDURE — 76881 US COMPL JOINT R-T W/IMG: CPT

## 2023-07-31 PROCEDURE — 0352U HC INF DIS BACTERIAL VAGINOSIS AND VAGINITIS AMPLIFIED PROBE TECHNIQUE: CPT | Performed by: NURSE PRACTITIONER
